# Patient Record
Sex: FEMALE | Race: WHITE | Employment: OTHER | URBAN - NONMETROPOLITAN AREA
[De-identification: names, ages, dates, MRNs, and addresses within clinical notes are randomized per-mention and may not be internally consistent; named-entity substitution may affect disease eponyms.]

---

## 2017-01-30 ENCOUNTER — OFFICE VISIT (OUTPATIENT)
Dept: FAMILY MEDICINE CLINIC | Age: 67
End: 2017-01-30

## 2017-01-30 VITALS
HEART RATE: 68 BPM | DIASTOLIC BLOOD PRESSURE: 60 MMHG | TEMPERATURE: 97.4 F | SYSTOLIC BLOOD PRESSURE: 120 MMHG | OXYGEN SATURATION: 98 % | WEIGHT: 146.8 LBS | BODY MASS INDEX: 24.46 KG/M2 | HEIGHT: 65 IN

## 2017-01-30 DIAGNOSIS — N32.81 OAB (OVERACTIVE BLADDER): ICD-10-CM

## 2017-01-30 DIAGNOSIS — M85.80 OSTEOPENIA: ICD-10-CM

## 2017-01-30 DIAGNOSIS — N39.3 STRESS INCONTINENCE: ICD-10-CM

## 2017-01-30 DIAGNOSIS — E07.9 THYROID DISORDER: ICD-10-CM

## 2017-01-30 DIAGNOSIS — E55.9 VITAMIN D DEFICIENCY: ICD-10-CM

## 2017-01-30 DIAGNOSIS — Z01.818 PRE-OP EXAMINATION: Primary | ICD-10-CM

## 2017-01-30 DIAGNOSIS — F32.A DEPRESSION, UNSPECIFIED DEPRESSION TYPE: ICD-10-CM

## 2017-01-30 DIAGNOSIS — H26.9 CATARACT, LEFT EYE: ICD-10-CM

## 2017-01-30 PROCEDURE — 99213 OFFICE O/P EST LOW 20 MIN: CPT | Performed by: NURSE PRACTITIONER

## 2020-06-25 ENCOUNTER — OFFICE VISIT (OUTPATIENT)
Dept: ORTHOPEDIC SURGERY | Age: 70
End: 2020-06-25
Payer: MEDICARE

## 2020-06-25 VITALS — HEIGHT: 65 IN | BODY MASS INDEX: 24.46 KG/M2 | WEIGHT: 146.83 LBS

## 2020-06-25 PROCEDURE — 99213 OFFICE O/P EST LOW 20 MIN: CPT | Performed by: ORTHOPAEDIC SURGERY

## 2020-06-25 RX ORDER — NAPROXEN 500 MG/1
500 TABLET ORAL 2 TIMES DAILY WITH MEALS
Qty: 60 TABLET | Refills: 0 | Status: SHIPPED | OUTPATIENT
Start: 2020-06-25 | End: 2020-08-07

## 2020-06-30 ENCOUNTER — TELEPHONE (OUTPATIENT)
Dept: ORTHOPEDIC SURGERY | Age: 70
End: 2020-06-30

## 2020-07-27 ENCOUNTER — OFFICE VISIT (OUTPATIENT)
Dept: ORTHOPEDIC SURGERY | Age: 70
End: 2020-07-27
Payer: MEDICARE

## 2020-07-27 VITALS — HEIGHT: 65 IN | WEIGHT: 146 LBS | BODY MASS INDEX: 24.32 KG/M2

## 2020-07-27 PROCEDURE — 99213 OFFICE O/P EST LOW 20 MIN: CPT | Performed by: ORTHOPAEDIC SURGERY

## 2020-07-27 RX ORDER — BETAMETHASONE SODIUM PHOSPHATE AND BETAMETHASONE ACETATE 3; 3 MG/ML; MG/ML
12 INJECTION, SUSPENSION INTRA-ARTICULAR; INTRALESIONAL; INTRAMUSCULAR; SOFT TISSUE ONCE
Status: COMPLETED | OUTPATIENT
Start: 2020-07-27 | End: 2020-07-27

## 2020-07-27 RX ORDER — BUPIVACAINE HYDROCHLORIDE 5 MG/ML
30 INJECTION, SOLUTION PERINEURAL ONCE
Status: COMPLETED | OUTPATIENT
Start: 2020-07-27 | End: 2020-07-27

## 2020-07-27 RX ADMIN — BUPIVACAINE HYDROCHLORIDE 150 MG: 5 INJECTION, SOLUTION PERINEURAL at 17:16

## 2020-07-27 RX ADMIN — BETAMETHASONE SODIUM PHOSPHATE AND BETAMETHASONE ACETATE 12 MG: 3; 3 INJECTION, SUSPENSION INTRA-ARTICULAR; INTRALESIONAL; INTRAMUSCULAR; SOFT TISSUE at 17:15

## 2020-07-27 NOTE — PROGRESS NOTES
of Systems:  Relevant review of systems reviewed and available in the patient's chart    Vital Signs: There were no vitals filed for this visit. General Exam:   Constitutional: Patient is adequately groomed with no evidence of malnutrition  DTRs: Deep tendon reflexes are intact  Mental Status: The patient is oriented to time, place and person. The patient's mood and affect are appropriate. Lymphatic: The lymphatic examination bilaterally reveals all areas to be without enlargement or induration. Vascular: Examination reveals no swelling or calf tenderness. Peripheral pulses are palpable and 2+. Neurological: The patient has good coordination. There is no weakness or sensory deficit. Right and left hip Examination:    Inspection:  No erythema swelling or signs of infection    Palpation:  Tenderness to palpation of the lateral aspect over the greater trochanter    Range of Motion:  Full range of motion with reproducible pain both over her groin and lateral hip. Strength:  5/5 hip flexion and abduction and adduction. Increased pain with hip flexion against resistance. Special Tests:  Positive Meli's test.     Skin: There are no rashes, ulcerations or lesions. Gait: Normal    Reflex 2+ patellar    Additional Comments:       Additional Examinations:         Right Upper Extremity:  Examination of the right upper extremity does not show any tenderness, deformity or injury. Range of motion is unremarkable. There is no gross instability. There are no rashes, ulcerations or lesions. Strength and tone are normal.  Left Upper Extremity: Examination of the left upper extremity does not show any tenderness, deformity or injury. Range of motion is unremarkable. There is no gross instability. There are no rashes, ulcerations or lesions.   Strength and tone are normal.    Radiology:       Site: [facility] #: [id number] #: [id number] Procedure: MR Left Hip w/o Contrast ; Reason for Exam: Exam: pain of both hip joints; r/o fx, AVN and bursitis    This document is confidential medical information.  Unauthorized disclosure or use of this information is prohibited by law. If you are not the intended recipient of this document, please advise us by calling immediately [phone].         [employer] [facility]              Patient Name: Flaca Pacheco    Case ID: [id number]    Patient : 1950    Referring Physician: Kayode Barragan MD    Exam Date: 2020    Exam Description: MR Right Hip w/o Contrast              HISTORY:  Pain of both hip joints.  Evaluate for fracture, AVN and bursitis.         TECHNICAL FACTORS:  Long- and short-axis fat- and water-weighted images were performed.         COMPARISON:  None.         FINDINGS:  No osseous fracture, AVN or mass.  Frayed blunted anterosuperior and superolateral    labrum.  No labral tear.         Tendinopathy, peritendinitis and partial-thickness tearing involves the gluteus minimus and    lateral facet attachment to the gluteus medius insertion with reactive bursitis.  Between    25%-50% of the attachment is torn.  Posterior facet attachment to the gluteus medius is spared.      Mild fatty atrophy involves the gluteus minimus muscle bilaterally.  Iliopsoas, rectus    femoris and hamstring tendon complex are unremarkable.         No chondral defect or loose body.         Sigmoid diverticulosis is noted on the film edge.         CONCLUSION:    1. Tendinopathy, peritendinitis and partial-thickness tearing involves the gluteus minimus and    less so lateral facet attachment of the gluteus medius insertion to the right greater    trochanter.  Associated bursitis.  Between 25%-50% of the attachment is torn. 2. Blunted worn anterosuperior labrum.  Chronic wear present.  No acute or displaced labral    tear. 3. No osseous fracture, AVN or mass. Assessment : Hip bursitis and partial abductor tear.   Patient is also suffering from more mild low back pain and hip flexor tendinitis    Impression:  Encounter Diagnoses   Name Primary?  Trochanteric bursitis of both hips Yes    Hip abductor tendinitis, unspecified laterality     Hip flexor tendinitis, unspecified laterality     Low back pain, unspecified back pain laterality, unspecified chronicity, unspecified whether sciatica present        Office Procedures:  Orders Placed This Encounter   Procedures    US ARTHR/ASP/INJ MAJOR JNT/BURSA LEFT     Standing Status:   Future     Number of Occurrences:   1     Standing Expiration Date:   7/27/2021   Ana Moreno \[facility] [place] [facility]\"" ([facility]) Physical Therapy     Referral Priority:   Routine     Referral Type:   Eval and Treat     Referral Reason:   Specialty Services Required     Requested Specialty:   Physical Therapy     Number of Visits Requested:   1     Left hip lateral injection    I discussed in detail the risks, benefits, and complications of an injection which include but are not limited to infection, skin reactions, hot swollen joints, and anaphylaxis with the patient. The patient verbalized good understanding and gave informed consent for the injection. The skin was prepped using sterile alcohol. A sterile 22-gauge needle was inserted into the area of maximal tenderness over the left  greater trochanter and a mixture of 2ml Beta-Beta, 5 mL of 0.25% Marcaine was injected under sterile technique. The needle was withdrawn and the puncture site sealed with a Band-Aid. Technique: Under sterile conditions a [employer] ultrasound unit with a variable frequency (6.0-15.0 MHz) linear transducer was used to localize the placement of a 22-gauge needle into the area of maximal tenderness over the left  greater trochanter. Findings: Successful needle placement for Hip injection. Final images were taken and saved for permanent record. The patient tolerated the injection well.  The patient was instructed to call the office immediately if there is any pain, redness, warmth, fever, or chills. Treatment Plan: With patient having more global symptoms we will start with a left lateral hip cortisone injection. She will stay on her Naprosyn. We will start her in physical therapy. We will see the patient back in 4 weeks. If she responds well continue with conservative care. If she fails to respond consideration for abductor repair.

## 2020-08-04 ENCOUNTER — HOSPITAL ENCOUNTER (OUTPATIENT)
Dept: PHYSICAL THERAPY | Age: 70
Setting detail: THERAPIES SERIES
Discharge: HOME | End: 2020-08-04
Payer: MEDICARE

## 2020-08-04 PROCEDURE — 97140 MANUAL THERAPY 1/> REGIONS: CPT

## 2020-08-04 PROCEDURE — 97161 PT EVAL LOW COMPLEX 20 MIN: CPT

## 2020-08-04 PROCEDURE — 97110 THERAPEUTIC EXERCISES: CPT

## 2020-08-04 NOTE — FLOWSHEET NOTE
Patient Education 10' Pt ed with HEP and progression of PT tx       Therapeutic Exercise and NMR EXR  [x] (11923) Provided verbal/tactile cueing for activities related to strengthening, flexibility, endurance, ROM for improvements in LE, proximal hip, and core control with self care, mobility, lifting, ambulation. [x] (14831) Provided verbal/tactile cueing for activities related to improving balance, coordination, kinesthetic sense, posture, motor skill, proprioception to assist with LE, proximal hip, and core control in self-care, mobility, lifting, ambulation and eccentric single leg control. NMR and Therapeutic Activities:    [x] (73547 or 70804) Provided verbal/tactile cueing for activities related to improving balance, coordination, kinesthetic sense, posture, motor skill, proprioception and motor activation to allow for proper function of core, proximal hip and LE with self-care and ADLs and functional mobility.    [x] (69416) Gait Re-education- Provided training and instruction to the patient for proper LE, core and proximal hip recruitment and positioning and eccentric body weight control with ambulation re-education including up and down stairs     Home Exercise Program:    [x] (28086) Reviewed/Progressed HEP activities related to strengthening, flexibility, endurance, ROM of core, proximal hip and LE for functional self-care, mobility, lifting and ambulation/stair navigation   [x] (42275) Reviewed/Progressed HEP activities related to improving balance, coordination, kinesthetic sense, posture, motor skill, proprioception of core, proximal hip and LE for self-care, mobility, lifting, and ambulation/stair navigation      Manual Treatments:  PROM / STM / Oscillations-Mobs:  G-I, II, III, IV (PA's, Inf., Post.)  [x] (97085) Provided manual therapy to mobilize LE, proximal hip and/or LS spine soft tissue/joints for the purpose of modulating pain, promoting relaxation, increasing ROM, reducing/eliminating soft tissue swelling/inflammation/restriction, improving soft tissue extensibility and allowing for proper ROM for normal function with self-care, mobility, lifting and ambulation. Modalities:      Charges:  Timed Code Treatment Minutes: 30'   Total Treatment Minutes:  45'   [facility]:  [facility] TIME:  MANUAL TIME:  UNTIMED MINUTES:   -  -  -  -      [x] EVAL (LOW) 28680 (typically 20 minutes face-to-face)  [] EVAL (MOD) 99700 (typically 30 minutes face-to-face)  [] EVAL (HIGH) 11944 (typically 45 minutes face-to-face)  [] RE-EVAL     [x] ZZ(01730) x  1   [] IONTO  [] NMR (66229) x     [] VASO  [x] Manual (07405) x 1    [] Other:  [] TA x      [] Mech Traction (76125)  [] ES(attended) (35685)      [] ES (un) (65255):    ASSESSMENT:  See eval    GOALS:   Short Term Goals: To be achieved in: 2 weeks  1. Independent in HEP and progression per patient tolerance, in order to prevent re-injury. [] Progressing: [] Met: [] Not Met: [] Adjusted   2. Patient will have a decrease in pain to facilitate improvement in movement, function, and ADLs as indicated by Functional Deficits. [] Progressing: [] Met: [] Not Met: [] Adjusted     Long Term Goals: To be achieved in: 12 weeks  1. Disability index score of 20% or less for the LEFS to assist with reaching prior level of function. [] Progressing: [] Met: [] Not Met: [] Adjusted   2. Patient will demonstrate increased AROM to equal the opposite side bilaterally to allow for proper joint functioning as indicated by patients Functional Deficits. [] Progressing: [] Met: [] Not Met: [] Adjusted   3. Patient will demonstrate an increase in strength of B Hip to 4+/5 grossly to allow for proper functional mobility as indicated by patients Functional Deficits. [] Progressing: [] Met: [] Not Met: [] Adjusted   4.  Patient will return to all transfers, work activities, and functional activities without increased symptoms or

## 2020-08-04 NOTE — PLAN OF CARE
[facility] [id number] [facility] [address], [address], [address] [facility]  Phone: [phone], [phone]                                                       Physical Therapy Certification    Dear Referring Practitioner: Geremias Jenkins,    We had the pleasure of evaluating the following patient for physical therapy services at [address] [place] [address]. A summary of our findings can be found in the initial assessment below. This includes our plan of care. If you have any questions or concerns regarding these findings, please do not hesitate to contact me at the office phone number checked above. Thank you for the referral.       Physician Signature:_______________________________Date:__________________  By signing above (or electronic signature), therapists plan is approved by physician    Patient: Sissy Manrique   : 1950   MRN: [id number]  Referring Physician: Referring Practitioner: Geremias Jenkins      Evaluation Date: 2020      Medical Diagnosis Information:  Diagnosis: B Hip Trochanteric Bursitis, Hip Abd/Flex Tendenitis   Treatment Diagnosis: M70.61; M70.62; M76.899                                         Insurance information: PT Insurance Information: [employer]     Precautions/ Contra-indications/Relevant Medical History: Partial Hip Abductor Tear    C-SSRS Triggered by Intake questionnaire (Past 2 wk assessment):   [x] No, Questionnaire did not trigger screening.   [] Yes, Patient intake triggered further evaluation      [] C-SSRS Screening completed  [] PCP notified via Plan of Care  [] Emergency services notified     Latex Allergy:  [x]NO      []YES  Preferred Language for Healthcare:   [x]English       []other:    SUBJECTIVE: Patient stated complaint: Patient is a 78 y/o female who present increased Hip pain bilaterally with L Hip > R Hip for a while now.  Patient had a MRI which revealed a small glut min tear and has had cortisone shot in the left hip. Functional Disability Index:LEFS: 62% (Score: 36/80)    Pain Scale: 1/10 current; 4-5/10 at worst  Easing factors: heat, Naproxen, Tyelonol  Provocative factors: working in garden,      Type: []Constant   []Intermittent  []Radiating []Localized []other:     Numbness/Tingling: Patient denies numbness and tingling    Occupation/School: Retired    Living Status/Prior Level of Function: Independent with ADLs and IADLs     OBJECTIVE:   SLR R 68 ° ; L 60 °      Hip 90/90 R 30 ° ; L: 31 °     ROM LEFT RIGHT   HIP Flex     HIP Abd     HIP Ext     HIP IR 20 °  18 °    HIP ER 30 °  35 °    Knee ext     Knee Flex     Ankle PF     Ankle DF     Ankle In     Ankle Ev     Strength  LEFT RIGHT   HIP Flexors 4-/5 4-/5   HIP Abductors \" \"   HIP Ext \" \"   Hip ER \" \"   Knee EXT (quad) 4+/5 4+/5   Knee Flex (HS) \" \"   Ankle DF     Ankle PF     Ankle Inv     Ankle EV          Balance (up to 10 sec) LEFT RIGHT   Feet Together     Off Set Stance     Tandem Stance     Single Limb          Circumference             Reflexes/Sensation:    [x]Dermatomes/Myotomes intact    [x]Reflexes equal and normal bilaterally   []Other:    Joint mobility:    []Normal    [x]Hypo   []Hyper    Palpation: noted tenderness to light touch     Functional Mobility/Transfers: WNL    Posture: decreased     Bandages/Dressings/Incisions: n/a    Gait: (include devices/WB status) WNL    Orthopedic Special Tests:  Vertell Hefty Test (+)                       [x] Patient history, allergies, meds reviewed. Medical chart reviewed. See intake form. Review Of Systems (ROS):  [x]Performed Review of systems (Integumentary, CardioPulmonary, Neurological) by intake and observation. Intake form has been scanned into medical record. Patient has been instructed to contact their primary care physician regarding ROS issues if not already being addressed at this time.       Co-morbidities/Complexities (which will affect course of rehabilitation):   []None Arthritic conditions   []Rheumatoid arthritis (M05.9)  []Osteoarthritis (M19.91)   Cardiovascular conditions   []Hypertension (I10)  []Hyperlipidemia (E78.5)  []Angina pectoris (I20)  []Atherosclerosis (I70)   Musculoskeletal conditions   []Disc pathology   []Congenital spine pathologies   [x]Prior surgical intervention: R shoulder RTC Repair 1999, R Elbow Surgery for Tendonitis   [x]Osteoporosis (M81.8)  []Osteopenia (M85.8)   Endocrine conditions   [x]Hypothyroid (E03.9)  []Hyperthyroid Gastrointestinal conditions   []Constipation (A83.69)   Metabolic conditions   []Morbid obesity (E66.01)  []Diabetes type 1(E10.65) or 2 (E11.65)   []Neuropathy (G60.9)     Pulmonary conditions   []Asthma (J45)  []Coughing   []COPD (J44.9)   Psychological Disorders  []Anxiety (F41.9)  []Depression (F32.9)   []Other:   [x]Other:   Hip Fracture 2009     Barriers to/and or personal factors that will affect rehab potential:              []Age  []Sex              []Motivation/Lack of Motivation                        []Co-Morbidities              []Cognitive Function, education/learning barriers              []Environmental, home barriers              []profession/work barriers  []past PT/medical experience  []other:  Justification:     Falls Risk Assessment (30 days):   [x] Falls Risk assessed and no intervention required.   [] Falls Risk assessed and Patient requires intervention due to being higher risk   TUG score (>12s at risk):     [] Falls education provided, including       ASSESSMENT:   Functional Impairments:     []Noted lumbar/proximal hip/LE joint hypomobility   []Decreased LE functional ROM   []Decreased core/proximal hip strength and neuromuscular control   [x]Decreased LE functional strength   [x]Reduced balance/proprioceptive control   []other:      Functional Activity Limitations (from functional questionnaire and intake)   []Reduced ability to tolerate prolonged functional positions   []Reduced ability or difficulty with changes of positions or transfers between positions   []Reduced ability to maintain good posture and demonstrate good body mechanics with sitting,  bending, and lifting   []Reduced ability to sleep   [] Reduced ability or tolerance with driving and/or computer work   [x]Reduced ability to perform lifting, carrying tasks   [x]Reduced ability to squat   [x]Reduced ability to forward bend   [x]Reduced ability to ambulate prolonged functional periods/distances/surfaces   [x]Reduced ability to ascend/descend stairs   []Reduced ability to run, hop, cut or jump   []other:    Participation Restrictions   []Reduced participation in self care activities   [x]Reduced participation in home management activities   [x]Reduced participation in work activities   []Reduced participation in social activities. []Reduced participation in sport/recreation activities. Classification :    []Signs/symptoms consistent with post-surgical status including decreased ROM, strength and  function.    []Signs/symptoms consistent with joint sprain/strain   []Signs/symptoms consistent with patella-femoral syndrome   []Signs/symptoms consistent with knee OA/hip OA   [x]Signs/symptoms consistent with internal derangement of knee/Hip   [x]Signs/symptoms consistent with functional hip weakness/NMR control      []Signs/symptoms consistent with tendinitis/tendinosis    []signs/symptoms consistent with pathology which may benefit from Dry needling      []other:      Prognosis/Rehab Potential:      []Excellent   [x]Good    []Fair   []Poor    Tolerance of evaluation/treatment:    []Excellent   [x]Good    []Fair   []Poor    Physical Therapy Evaluation Complexity Justification   [x] A history of present problem with:  [] no personal factors and/or comorbidities that impact the plan of care;  []1-2 personal factors and/or comorbidities that impact the plan of care  [x]3 personal factors and/or comorbidities that impact the plan of care  [x] An examination of body systems using standardized tests and measures addressing any of the following: body structures and functions (impairments), activity limitations, and/or participation restrictions;:  [] a total of 1-2 or more elements   [] a total of 3 or more elements   [x] a total of 4 or more elements   [x] A clinical presentation with:  [x] stable and/or uncomplicated characteristics   [] evolving clinical presentation with changing characteristics  [] unstable and unpredictable characteristics;   [x] Clinical decision making of [x] low, [] moderate, [] high complexity using standardized patient assessment instrument and/or measurable assessment of functional outcome. [x] EVAL (LOW) 64710 (typically 20 minutes face-to-face)  [] EVAL (MOD) 42729 (typically 30 minutes face-to-face)  [] EVAL (HIGH) 65273 (typically 45 minutes face-to-face)  [] RE-EVAL     PLAN  Frequency/Duration:  1-2 days per week for 12 weeks:  Interventions:  [x]  Therapeutic exercise including: strength training, ROM, for Lower extremity and core   [x]  NMR activation and proprioception for LE, Glutes and Core   [x]  Manual therapy as indicated for LE, Hip and spine to include: Dry Needling/IASTM, STM, PROM, Gr I-IV mobilizations, manipulation. [x] Modalities as needed that may include: thermal agents, E-stim, Biofeedback, US, iontophoresis as indicated  [x] Patient education on joint protection, postural re-education, activity modification, progression of HEP. HEP instruction: (see scanned forms)    GOALS:    Therapist goals for Patient:   Short Term Goals: To be achieved in: 2 weeks  1. Independent in HEP and progression per patient tolerance, in order to prevent re-injury. [] Progressing: [] Met: [] Not Met: [] Adjusted   2. Patient will have a decrease in pain to facilitate improvement in movement, function, and ADLs as indicated by Functional Deficits. [] Progressing: [] Met: [] Not Met: [] Adjusted     Long Term Goals:  To be achieved in: 12 weeks  1. Disability index score of 20% or less for the LEFS to assist with reaching prior level of function. [] Progressing: [] Met: [] Not Met: [] Adjusted   2. Patient will demonstrate increased AROM to equal the opposite side bilaterally to allow for proper joint functioning as indicated by patients Functional Deficits. [] Progressing: [] Met: [] Not Met: [] Adjusted   3. Patient will demonstrate an increase in strength of B Hip to 4+/5 grossly to allow for proper functional mobility as indicated by patients Functional Deficits. [] Progressing: [] Met: [] Not Met: [] Adjusted   4. Patient will return to all transfers, work activities, and functional activities without increased symptoms or restriction. [] Progressing: [] Met: [] Not Met: [] Adjusted   5. Patient will have 0/10 pain with ADL's.  [] Progressing: [] Met: [] Not Met: [] Adjusted   6.  Patient stated goal: To return to PLOF and prior walking distance  [] Progressing: [] Met: [] Not Met: [] Adjusted      Electronically signed by:  Octavio Nicolas, PT

## 2020-08-07 RX ORDER — NAPROXEN 500 MG/1
TABLET ORAL
Qty: 60 TABLET | Refills: 0 | Status: SHIPPED | OUTPATIENT
Start: 2020-08-07 | End: 2021-03-11 | Stop reason: ALTCHOICE

## 2020-08-14 ENCOUNTER — HOSPITAL ENCOUNTER (OUTPATIENT)
Dept: PHYSICAL THERAPY | Age: 70
Setting detail: THERAPIES SERIES
Discharge: HOME | End: 2020-08-14
Payer: MEDICARE

## 2020-08-14 PROCEDURE — 97112 NEUROMUSCULAR REEDUCATION: CPT | Performed by: SPECIALIST

## 2020-08-14 PROCEDURE — 97140 MANUAL THERAPY 1/> REGIONS: CPT | Performed by: SPECIALIST

## 2020-08-14 PROCEDURE — 97110 THERAPEUTIC EXERCISES: CPT | Performed by: SPECIALIST

## 2020-08-14 NOTE — FLOWSHEET NOTE
[facility], [address] [place] [address] [address], [id number]    Physical Therapy Treatment Note/ Progress Report:     Date:  2020    Patient Name:  Mandi Mendoza    :  1950  MRN: [id number]  Restrictions/Precautions:    Medical/Treatment Diagnosis Information:  · Diagnosis: B Hip Trochanteric Bursitis, Hip Abd/Flex Tendenitis  · Treatment Diagnosis: M70.61; M70.62; F68.613  Insurance/Certification information:  PT Insurance Information: Med [employer]  Physician Information:  Referring Practitioner: Sania Peters  Has the plan of care been signed (Y/N):        []  Yes  [x]  No     Date of Patient follow up with Physician:     Is this a Progress Report:     []  Yes  [x]  No      If Yes:  Date Range for reporting period:  Initial Eval: 2020  Beginnin2020 --- Endin/3/2020    Progress report will be due (10 Rx or 30 days whichever is less): 9/3/7780     Recertification will be due (POC Duration  / 90 days whichever is less): 2020      Visit # Insurance Allowable Auth Required   In Person 2 Med Nec []  Yes     []  No    Tele Health -  []  Yes     []  No    Total 2       Functional Scale: LEFS: 62% (Score: 36/80)   Date assessed: 2020      Latex Allergy:  [x]NO      []YES  Preferred Language for Healthcare:   [x]English       []other:    Pain level:  1-2/10     SUBJECTIVE:  Reports L hip discomfort with sitting in car    OBJECTIVE: See eval   Observation:    Test measurements:      RESTRICTIONS/PRECAUTIONS: Partial Glut Min Tear    Exercises/Interventions:   Therapeutic Ex (11736)  Therapeutic Activity (47274)  NMR re-education (24612) Sets/Reps Notes/CUES   Bike 6 min         PPT 10 x 10\"    Bridge with PPT 10 x 10\"    Bent Knee Fall Out with PPT 10 x 5\" ea          HL Hip Abd 3 way 15 x ea Green   SL Clamshell 15 x ea Green TC cueing for proper form        SLR with PPT 20 x    SSLR 20 x  TC for proper form                  LP 60# 30x    KAREN  15# 10x ea         Piriformis stretch 30x2 Piriformis cross over stretch 30x2                             Manual Intervention ([id number])     STM to Lateral Hip 8'                                                 Patient Education 10' Pt ed with HEP and progression of PT tx       Therapeutic Exercise and NMR EXR  [x] (47901) Provided verbal/tactile cueing for activities related to strengthening, flexibility, endurance, ROM for improvements in LE, proximal hip, and core control with self care, mobility, lifting, ambulation. [x] (77375) Provided verbal/tactile cueing for activities related to improving balance, coordination, kinesthetic sense, posture, motor skill, proprioception to assist with LE, proximal hip, and core control in self-care, mobility, lifting, ambulation and eccentric single leg control. NMR and Therapeutic Activities:    [x] (02906 or 03662) Provided verbal/tactile cueing for activities related to improving balance, coordination, kinesthetic sense, posture, motor skill, proprioception and motor activation to allow for proper function of core, proximal hip and LE with self-care and ADLs and functional mobility.    [x] (42001) Gait Re-education- Provided training and instruction to the patient for proper LE, core and proximal hip recruitment and positioning and eccentric body weight control with ambulation re-education including up and down stairs     Home Exercise Program:    [x] (79386) Reviewed/Progressed HEP activities related to strengthening, flexibility, endurance, ROM of core, proximal hip and LE for functional self-care, mobility, lifting and ambulation/stair navigation   [x] (06286) Reviewed/Progressed HEP activities related to improving balance, coordination, kinesthetic sense, posture, motor skill, proprioception of core, proximal hip and LE for self-care, mobility, lifting, and ambulation/stair navigation      Manual Treatments:  PROM / STM / Oscillations-Mobs:  G-I, II, III, IV (PA's, Inf., Post.)  [x] (33351) Provided manual therapy to mobilize LE, proximal hip and/or LS spine soft tissue/joints for the purpose of modulating pain, promoting relaxation, increasing ROM, reducing/eliminating soft tissue swelling/inflammation/restriction, improving soft tissue extensibility and allowing for proper ROM for normal function with self-care, mobility, lifting and ambulation. Modalities:      Charges:  Timed Code Treatment Minutes: 38   Total Treatment Minutes:  38   BWC:  TE TIME:  NMR TIME:  MANUAL TIME:  UNTIMED MINUTES:   -  -  -  -      [] EVAL (LOW) 96542 (typically 20 minutes face-to-face)  [] EVAL (MOD) 20584 (typically 30 minutes face-to-face)  [] EVAL (HIGH) 53528 (typically 45 minutes face-to-face)  [] RE-EVAL     [x] IM(01662) x  1   [] IONTO  [x] NMR (08960) x  1  [] VASO  [x] Manual (97783) x 1    [] Other:  [] TA x      [] Mech Traction (92516)  [] ES(attended) (47305)      [] ES (un) (09288):    ASSESSMENT:  Good tolerance with TE, tenderness with MFR.    GOALS:   Short Term Goals: To be achieved in: 2 weeks  1. Independent in HEP and progression per patient tolerance, in order to prevent re-injury. [x] Progressing: [] Met: [] Not Met: [] Adjusted   2. Patient will have a decrease in pain to facilitate improvement in movement, function, and ADLs as indicated by Functional Deficits. [x] Progressing: [] Met: [] Not Met: [] Adjusted     Long Term Goals: To be achieved in: 12 weeks  1. Disability index score of 20% or less for the LEFS to assist with reaching prior level of function. [x] Progressing: [] Met: [] Not Met: [] Adjusted   2. Patient will demonstrate increased AROM to equal the opposite side bilaterally to allow for proper joint functioning as indicated by patients Functional Deficits. [x] Progressing: [] Met: [] Not Met: [] Adjusted   3. Patient will demonstrate an increase in strength of B Hip to 4+/5 grossly to allow for proper functional mobility as indicated by patients Functional Deficits.    [x] with most recent update on progress.

## 2020-08-21 ENCOUNTER — HOSPITAL ENCOUNTER (OUTPATIENT)
Dept: PHYSICAL THERAPY | Age: 70
Setting detail: THERAPIES SERIES
Discharge: HOME | End: 2020-08-21
Payer: MEDICARE

## 2020-08-21 NOTE — FLOWSHEET NOTE
[facility], [address] [place] [address], [address] [place] [address] [address]  Phone: [phone]   Fax:     [phone]    Physical Therapy  Cancellation/No-show Note  Patient Name:  Alondra Hoffman  :  1950   Date:  2020    Cancelled visits to date: 0  No-shows to date: 0    For today's appointment patient:  [x]  Cancelled  []  Rescheduled appointment  []  No-show     Reason given by patient:  []  Patient ill  []  Conflicting appointment  []  No transportation    []  Conflict with work  []  No reason given  [x]  Other:     Comments: Had to take daughter to hospital     Phone call information:   []  Phone call made today to patient at _ time at number provided:      []  Patient answered, conversation as follows:    []  Patient did not answer, message left as follows:  []  Phone call not made today  [x]  Phone call not needed - pt contacted us to cancel and provided reason for cancellation.      Electronically signed by:  Stephanie Tan PT

## 2020-12-28 ENCOUNTER — OFFICE VISIT (OUTPATIENT)
Dept: ORTHOPEDIC SURGERY | Age: 70
End: 2020-12-28
Payer: MEDICARE

## 2020-12-28 VITALS — HEIGHT: 64 IN | BODY MASS INDEX: 24.92 KG/M2 | WEIGHT: 146 LBS

## 2020-12-28 PROCEDURE — L3908 WHO COCK-UP NONMOLDE PRE OTS: HCPCS | Performed by: PHYSICAL MEDICINE & REHABILITATION

## 2020-12-28 PROCEDURE — 99204 OFFICE O/P NEW MOD 45 MIN: CPT | Performed by: PHYSICAL MEDICINE & REHABILITATION

## 2020-12-28 RX ORDER — METHYLPREDNISOLONE 4 MG/1
TABLET ORAL
Qty: 1 KIT | Refills: 0 | Status: SHIPPED | OUTPATIENT
Start: 2020-12-28 | End: 2021-01-03

## 2020-12-28 NOTE — PROGRESS NOTES
New Patient: SPINE    CHIEF COMPLAINT:    Chief Complaint   Patient presents with    Neck Pain     numbness in rt hand x3mths       HISTORY OF PRESENT ILLNESS:                The patient is a 72 y.o. female established patient who I last saw 2015. 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 She reports 1 month history of numbness over digits 1 through 3. Worse when she is brushing her teeth or doing her hair. She has a history of neck problems but no radiating pain from her neck to her hand. She has some subjective weakness. .  She previously had similar symptoms she said she had a negative EMG limbs from her neck. This was resolved with conservative care    She had some treatment with physical therapy recently is now referred here for follow-up  Past Medical History:   Diagnosis Date    Depression     Ischial bursitis     Osteopenia     Thyroid disease     Vitamin D deficiency           Pain Assessment  Location of Pain: Neck  Location Modifiers: Right  Severity of Pain: 0    The pain assessment was noted & reviewed in the medical record today.      Current/Past Treatment:   · Physical Therapy:   · Chiropractic:     · Injection:     Medications:            NSAIDS:             Muscle relaxer:              Steriods:              Neuropathic medications:              Opioids:            Other:   · Surgery/Consult:    Work Status/Functionality: Now retired    Past Medical History: Medical history form was reviewed today & scanned into the media tab  Past Medical History:   Diagnosis Date    Depression     Ischial bursitis     Osteopenia     Thyroid disease     Vitamin D deficiency       Past Surgical History:     Past Surgical History:   Procedure Laterality Date    ELBOW SURGERY      HYSTERECTOMY      SHOULDER SURGERY      Right     Current Medications:     Current Outpatient Medications:     methylPREDNISolone (MEDROL, STEFANY,) 4 MG tablet, Take by mouth., Disp: 1 kit, Rfl: 0   naproxen (NAPROSYN) 500 MG tablet, TAKE 1 TABLET BY MOUTH TWICE DAILY WITH MEALS, Disp: 60 tablet, Rfl: 0    aspirin 81 MG tablet, Take 81 mg by mouth daily, Disp: , Rfl:     ibuprofen (ADVIL;MOTRIN) 600 MG tablet, Take 1 tablet by mouth every 8 hours as needed for Pain., Disp: 60 tablet, Rfl: 0    Multiple Vitamins-Minerals (CENTRUM SILVER PO), Take  by mouth., Disp: , Rfl:     Vitamin D (CHOLECALCIFEROL) 1000 UNITS CAPS capsule, Take 2,000 Units by mouth daily. , Disp: , Rfl:     levothyroxine (SYNTHROID) 25 MCG tablet, Take 25 mcg by mouth Daily. , Disp: , Rfl:     LIOTHYRONINE SODIUM, by Does not apply route., Disp: , Rfl:     escitalopram (LEXAPRO) 5 MG tablet, Take 5 mg by mouth daily. , Disp: , Rfl:     hydrochlorothiazide (HYDRODIURIL) 25 MG tablet, Take 50 mg by mouth daily , Disp: , Rfl:     estrogens conjugated, synthetic A, (CENESTIN) 1.25 MG tablet, Take 10 mg by mouth daily. , Disp: , Rfl:   Allergies: Forteo [parathyroid hormone (recomb)], Atarax [hydroxyzine], Codeine, and Vicodin [hydrocodone-acetaminophen]  Social History:    reports that she has never smoked. She has never used smokeless tobacco. She reports that she does not drink alcohol or use drugs.   Family History:   Family History   Problem Relation Age of Onset   [facility] Cancer Mother         brain    Cancer Sister         breast    Other Father         MI       REVIEW OF SYSTEMS: Full ROS noted & scanned   CONSTITUTIONAL: Denies unexplained weight loss, fevers, chills or fatigue  NEUROLOGICAL: Denies unsteady gait or progressive weakness  MUSCULOSKELETAL: Denies joint swelling or redness  PSYCHOLOGICAL: Denies anxiety, depression   SKIN: Denies skin changes, delayed healing, rash, itching   HEMATOLOGIC: Denies easy bleeding or bruising  ENDOCRINE: Denies excessive thirst, urination, heat/cold  RESPIRATORY: Denies current dyspnea, cough  GI: Denies nausea, vomiting, diarrhea   : Denies bowel or bladder issues PHYSICAL EXAM:    Vitals: Height 5' 4\" (1.626 m), weight 146 lb (66.2 kg), not currently breastfeeding. GENERAL EXAM:  · General Apparence: Patient is adequately groomed with no evidence of malnutrition. · Orientation: The patient is oriented to time, place and person. · Mood & Affect:The patient's mood and affect are appropriate   · Vascular: Examination reveals no swelling tenderness in upper or lower extremities. Good capillary refill  · Lymphatic: The lymphatic examination bilaterally reveals all areas to be without enlargement or induration  · Sensation: Sensation is intact without deficit  · Coordination/Balance: Good coordination     CERVICAL EXAMINATION:  · Inspection: Local inspection shows no step-off or bruising. Cervical alignment is normal.     · Palpation: No evidence of tenderness at the midline, and trapezius. Paraspinal tenderness is present. There is no step-off or paraspinal spasm. · Range of Motion: Neck pain with flexion  · Strength: 5/5 bilateral upper extremities   · Special Tests:    ·   Spurling's causes neck pain, L'Hermitte's & Yeager's negative bilaterally. ·   Ferguson and Impingement tests are negative bilaterally. ·  Phalen's test positive on the right     · Skin:There are no rashes, ulcerations or lesions in right & left upper extremities. · Reflexes: Bilaterally triceps, biceps and brachioradialis are 2+. Clonus absent bilaterally at the feet. · Additional Examinations:       · RIGHT UPPER EXTREMITY:  Inspection/examination of the right upper extremity does not show any tenderness, deformity or injury. Range of motion is full. There is no gross instability. There are no rashes, ulcerations or lesions.  Strength and tone are normal. · LEFT UPPER EXTREMITY: Inspection/examination of the left upper extremity does not show any tenderness, deformity or injury. Range of motion is full. There is no gross instability. There are no rashes, ulcerations or lesions. Strength and tone are normal.    LUMBAR/SACRAL EXAMINATION:  · Inspection: Local inspection shows no step-off or bruising. Lumbar alignment is normal.  Sagittal and Coronal balance is neutral.      ·   · Strength:   Strength testing is 5/5 in all muscle groups tested. · Special Tests:   Straight leg raise and crossed SLR negative. Leg length and pelvis level.  0 out of 5 Cj's signs. · Skin: There are no rashes, ulcerations or lesions. · Reflexes: Reflexes are symmetrically 2+ at the patellar and ankle tendons. Clonus absent bilaterally at the feet. · Gait & station: Normal gait  · Additional Examinations:   · RIGHT LOWER EXTREMITY: Inspection/examination of the right lower extremity does not show any tenderness, deformity or injury. Range of motion is full. There is no gross instability. There are no rashes, ulcerations or lesions. Strength and tone are normal.  ·   · LEFT LOWER EXTREMITY:  Inspection/examination of the left lower extremity does not show any tenderness, deformity or injury. Range of motion is full. There is no gross instability. There are no rashes, ulcerations or lesions. Strength and tone are normal.    Diagnostic Testing:      Cervical 2 views AP and lateral x-rays show moderate diffuse discogenic spondylosis; unchanged from 2060    Impression:    Right carpal tunnel syndrome  History of cervical strain, cervical discogenic spondylosis      Plan:     I think the majority of her symptoms are from carpal tunnel syndrome    She will try cock-up wrist splint and a Medrol Dosepak    She will call if no better in 1 to 2 weeks.   If not then directly schedule EMG right upper extremity    F Beto Najera

## 2021-03-11 ENCOUNTER — OFFICE VISIT (OUTPATIENT)
Dept: INTERNAL MEDICINE CLINIC | Age: 71
End: 2021-03-11

## 2021-03-11 VITALS
HEIGHT: 64 IN | BODY MASS INDEX: 25.61 KG/M2 | DIASTOLIC BLOOD PRESSURE: 78 MMHG | RESPIRATION RATE: 18 BRPM | WEIGHT: 150 LBS | SYSTOLIC BLOOD PRESSURE: 145 MMHG | TEMPERATURE: 98.5 F | HEART RATE: 70 BPM

## 2021-03-11 DIAGNOSIS — E03.9 ACQUIRED HYPOTHYROIDISM: ICD-10-CM

## 2021-03-11 DIAGNOSIS — R03.0 ELEVATED BLOOD PRESSURE READING: ICD-10-CM

## 2021-03-11 DIAGNOSIS — Z76.89 ENCOUNTER TO ESTABLISH CARE: Primary | ICD-10-CM

## 2021-03-11 DIAGNOSIS — M80.80XD LOCALIZED OSTEOPOROSIS WITH CURRENT PATHOLOGICAL FRACTURE WITH ROUTINE HEALING, SUBSEQUENT ENCOUNTER: ICD-10-CM

## 2021-03-11 DIAGNOSIS — F32.9 REACTIVE DEPRESSION: ICD-10-CM

## 2021-03-11 PROCEDURE — 99203 OFFICE O/P NEW LOW 30 MIN: CPT | Performed by: INTERNAL MEDICINE

## 2021-03-11 RX ORDER — LEVOTHYROXINE SODIUM 0.05 MG/1
50 TABLET ORAL DAILY
COMMUNITY
Start: 2021-02-04 | End: 2021-06-18 | Stop reason: SDUPTHER

## 2021-03-11 NOTE — PROGRESS NOTES
Samantha Head (:  1950) is a 72 y.o. female,New patient, here for evaluation of the following chief complaint(s):  Establish Care          HPI     72 y.o. female with hx of hypothyroid and anxiety here to establish care    Hypothyroid - on cytomel and levothyroxine , previously seen endocrine at [facility] but not anymore  No recent heat or cold intolerance .  No recent weight changes  No constipation     Chronic anxiety and mild depression - started with husbands illness and worsened after he passed away  - on lexapro 5 mg daily with good control    Non smoker, non alcoholic    Hx of osteoporosis , on vit d supplements only     Hx of left femur fracture 10 yrs ago     Reports chronic shawn hip pains and shoulder pain fw        Past Medical History:   Diagnosis Date    Depression     Ischial bursitis     Osteopenia     Thyroid disease     Vitamin D deficiency      Past Surgical History:   Procedure Laterality Date    ELBOW SURGERY      HYSTERECTOMY      SHOULDER SURGERY      Right     Allergies   Allergen Reactions    Forteo [Parathyroid Hormone (Recomb)] Shortness Of Breath, Rash and Other (See Comments)     Boils under arms and upper thigh    Atarax [Hydroxyzine]     Codeine Nausea Only    Vicodin [Hydrocodone-Acetaminophen]      Social History     Socioeconomic History    Marital status:      Spouse name: Not on file    Number of children: Not on file    Years of education: Not on file    Highest education level: Not on file   Occupational History    Not on file   Social Needs    Financial resource strain: Not on file    Food insecurity     Worry: Not on file     Inability: Not on file    Transportation needs     Medical: Not on file     Non-medical: Not on file   Tobacco Use    Smoking status: Never Smoker    Smokeless tobacco: Never Used   Substance and Sexual Activity    Alcohol use: No    Drug use: No    Sexual activity: Not on file   Lifestyle    Physical activity     Days per week: Not on file     Minutes per session: Not on file    Stress: Not on file   Relationships    Social connections     Talks on phone: Not on file     Gets together: Not on file     Attends Judaism service: Not on file     Active member of club or organization: Not on file     Attends meetings of clubs or organizations: Not on file     Relationship status: Not on file    Intimate partner violence     Fear of current or ex partner: Not on file     Emotionally abused: Not on file     Physically abused: Not on file     Forced sexual activity: Not on file   Other Topics Concern    Not on file   Social History Narrative    Not on file     Family History   Problem Relation Age of Onset    Cancer Mother         brain    Cancer Sister         breast    Allergy (Severe) Sister     High Blood Pressure Sister     Other Father         MI    Heart Disease Father     Diabetes Maternal Cousin     Allergy (Severe) Sister     High Blood Pressure Sister     Allergy (Severe) Sister     High Blood Pressure Sister     High Blood Pressure Daughter        Review of Systems      Constitutional: Negative for fever or chills  HENT: Negative for sore throat   Eyes: Negative for redness   Respiratory: Negative  for dyspnea, cough   Cardiovascular: Negative for chest pain   Gastrointestinal:neg for abd pain, nausea or vomiting or diarrhea  No melena or BRPR  Genitourinary: Negative for hematuria   Musculoskeletal: Negative for arthralgias   Skin: Negative for rash   Neurological: Negative for syncope   Hematological: Negative for adenopathy   Psychiatric/Behavorial: Negative for anxiety      Physical Exam  Vitals:    03/11/21 1603   Temp: 98.5 °F (36.9 °C)         General: elderly female,  Awake, alert and oriented.  Appears to be not in any distress  Mucous Membranes:  Pink , anicteric  Neck: No JVD, no carotid bruit, no thyromegaly  Chest:  Clear to auscultation bilaterally, no added sounds Cardiovascular:  RRR S1S2 heard, no murmurs or gallops  Abdomen:  Soft, undistended, non tender, no organomegaly, BS present  Extremities: No edema or cyanosis. Distal pulses well felt  Neurological : grossly normal     Diagnosis Orders   1. Encounter to establish care  [address] [employer]   2. Reactive depression     3. Acquired hypothyroidism     4. Localized osteoporosis with current pathological fracture with routine healing, subsequent encounter     5. Elevated blood pressure reading         Hypothyroid , acquired- on cytomel and synthroid, check TSH and adjust meds   No symptoms of hypo or hyperthryoid    Reaction depression - on lexapro 5 mg , can increase if needed     Osteoporosis with hx of left femur fracture - had dexa at [facility]  Consider prolia    Continue vit d supplements     Hx of hyperTGL - need repeat labs     Had pna vaccine, recommend pneumovax and covid vaccine    An electronic signature was used to authenticate this note.     --Evelin Alan MD

## 2021-03-12 ENCOUNTER — CLINICAL DOCUMENTATION (OUTPATIENT)
Dept: SPIRITUAL SERVICES | Age: 71
End: 2021-03-12

## 2021-03-12 LAB — TSH REFLEX: 1.26 UIU/ML (ref 0.27–4.2)

## 2021-03-12 NOTE — PROGRESS NOTES
Pt called and scheduled appointment to complete ADs on 3/16/21 at 1430hrs in the [facility] office.

## 2021-03-12 NOTE — FLOWSHEET NOTE
03/12/21 1110   Encounter Summary   Services provided to: Patient   Referral/Consult From: Physician;Patient   Continue Visiting Yes  (3/12 Pt called & set-up AD appt for 3/16/21, 1430hrs, [facility])   Complexity of Encounter Moderate   Length of Encounter 15 minutes   Advance Care Planning Yes   Advance Directives (For Healthcare)   Healthcare Directive No, patient does not have an advance directive for healthcare treatment   Advance Directives   (Scheduled appt to complete w/[facility])

## 2021-03-16 ENCOUNTER — CLINICAL DOCUMENTATION (OUTPATIENT)
Dept: SPIRITUAL SERVICES | Age: 71
End: 2021-03-16

## 2021-03-16 NOTE — PROGRESS NOTES
call to check on patient, patient had scheduled a time to fill out Power of [place] forms with  at [facility] office. Patient had forgotten about appointment. Patient to call back with a time to assist with completing forms, has [place] office number.     Simona Sparrow       03/16/21 1542   Encounter Summary   Services provided to: Patient   Referral/Consult From: Other    Support System Spouse   Continue Visiting   (3/16: follow up, pt forgot about meeting for POA/LW)   Complexity of Encounter Moderate   Length of Encounter 15 minutes   Spiritual Assessment Completed Yes   Spiritual/Yazidi   Type Spiritual support   Assessment Approachable

## 2021-04-26 RX ORDER — FLUTICASONE PROPIONATE 50 MCG
2 SPRAY, SUSPENSION (ML) NASAL DAILY
Qty: 3 BOTTLE | Refills: 0 | Status: SHIPPED | OUTPATIENT
Start: 2021-04-26 | End: 2022-02-09

## 2021-05-25 ENCOUNTER — TELEPHONE (OUTPATIENT)
Dept: INTERNAL MEDICINE CLINIC | Age: 71
End: 2021-05-25

## 2021-05-25 NOTE — TELEPHONE ENCOUNTER
----- Message from Nicko Stark MD sent at 5/25/2021  4:55 PM EDT -----  Contact: [phone]  Tomorrow  ----- Message -----  From: Aidee Corbin  Sent: 5/25/2021   4:39 PM EDT  To: Nicko Stark MD    Pt has a rash on her neck, shoulders, arm and hands started a week ago it seems to be spreading wanted to get an appointment with you this week she cant get into see her dermatologist

## 2021-05-26 ENCOUNTER — OFFICE VISIT (OUTPATIENT)
Dept: INTERNAL MEDICINE CLINIC | Age: 71
End: 2021-05-26

## 2021-05-26 VITALS
WEIGHT: 148 LBS | DIASTOLIC BLOOD PRESSURE: 75 MMHG | HEIGHT: 64 IN | SYSTOLIC BLOOD PRESSURE: 125 MMHG | HEART RATE: 70 BPM | BODY MASS INDEX: 25.27 KG/M2 | RESPIRATION RATE: 18 BRPM

## 2021-05-26 DIAGNOSIS — B35.1 NAIL FUNGAL INFECTION: ICD-10-CM

## 2021-05-26 DIAGNOSIS — L24.9 IRRITANT CONTACT DERMATITIS, UNSPECIFIED TRIGGER: ICD-10-CM

## 2021-05-26 DIAGNOSIS — E03.9 ACQUIRED HYPOTHYROIDISM: Primary | ICD-10-CM

## 2021-05-26 PROCEDURE — 99212 OFFICE O/P EST SF 10 MIN: CPT | Performed by: INTERNAL MEDICINE

## 2021-05-26 NOTE — PROGRESS NOTES
HPI   72 y.o. female with hx of hypothyroidsm here for  new rash for last few days  Pt reports she started itchy, scaly rash on right side of neck since last 3 days. Denies any new chemical contact or new jewerly. No fevers or chills   Did not try any new meds       Review of Systems   as above     Objective   Physical Exam   Vitals:    05/26/21 1419   BP: 125/75   Pulse: 70   Resp: 18         General:  Awake, alert and oriented. Appears to be not in any distress  Mucous Membranes:  Pink , anicteric  Neck: No JVD, no carotid bruit, no thyromegaly  Scaly erythematous rash on right side of neck   Occasional spots on both arms   Chest:  Clear to auscultation bilaterally, no added sounds  Cardiovascular:  RRR S1S2 heard, no murmurs or gallops  Abdomen:  Soft, undistended, non tender, no organomegaly, BS present  Extremities: No edema or cyanosis. Distal pulses well felt  Neurological : grossly normal         Diagnosis Orders   1. Acquired hypothyroidism     2. Irritant contact dermatitis, unspecified trigger     3. Nail fungal infection  COMPREHENSIVE METABOLIC PANEL          Contact dermatitis - add triamcilone cream  Try to figure new chemical    Toe nail fungus- check LFT for lamisil    Nicko Stark MD, MD 5/27/2021 2:37 PM        An electronic signature was used to authenticate this note.     --Nicko Stark MD

## 2021-05-27 LAB
A/G RATIO: 1.4 (ref 1.1–2.2)
ALBUMIN SERPL-MCNC: 4.2 G/DL (ref 3.4–5)
ALP BLD-CCNC: 88 U/L (ref 40–129)
ALT SERPL-CCNC: 10 U/L (ref 10–40)
ANION GAP SERPL CALCULATED.3IONS-SCNC: 13 MMOL/L (ref 3–16)
AST SERPL-CCNC: 22 U/L (ref 15–37)
BILIRUB SERPL-MCNC: 0.4 MG/DL (ref 0–1)
BUN BLDV-MCNC: 15 MG/DL (ref 7–20)
CALCIUM SERPL-MCNC: 10 MG/DL (ref 8.3–10.6)
CHLORIDE BLD-SCNC: 99 MMOL/L (ref 99–110)
CO2: 29 MMOL/L (ref 21–32)
CREAT SERPL-MCNC: 0.8 MG/DL (ref 0.6–1.2)
GFR AFRICAN AMERICAN: >60
GFR NON-AFRICAN AMERICAN: >60
GLOBULIN: 3 G/DL
GLUCOSE BLD-MCNC: 113 MG/DL (ref 70–99)
POTASSIUM SERPL-SCNC: 4.3 MMOL/L (ref 3.5–5.1)
SODIUM BLD-SCNC: 141 MMOL/L (ref 136–145)
TOTAL PROTEIN: 7.2 G/DL (ref 6.4–8.2)

## 2021-05-27 RX ORDER — TERBINAFINE HYDROCHLORIDE 250 MG/1
250 TABLET ORAL DAILY
Qty: 42 TABLET | Refills: 0 | Status: SHIPPED | OUTPATIENT
Start: 2021-05-27 | End: 2021-06-18 | Stop reason: SDUPTHER

## 2021-06-01 ENCOUNTER — TELEPHONE (OUTPATIENT)
Dept: INTERNAL MEDICINE CLINIC | Age: 71
End: 2021-06-01

## 2021-06-01 RX ORDER — DICYCLOMINE HYDROCHLORIDE 10 MG/1
10 CAPSULE ORAL
Qty: 90 CAPSULE | Refills: 0 | Status: ON HOLD | OUTPATIENT
Start: 2021-06-01 | End: 2021-12-23 | Stop reason: HOSPADM

## 2021-06-01 NOTE — TELEPHONE ENCOUNTER
----- Message from Getachew Martin sent at 6/1/2021  4:43 PM EDT -----  Contact: Melany    [phone]  Per Dr BOLAÑOS-Pricilla 10 mg TID before meals #90  ----- Message -----  From: Aakash Rangel  Sent: 6/1/2021   2:51 PM EDT  To: Jere Rojas MD    This is a patient of Dr. Shelby Lovelace . Patient called and is experiencing IBS, and would like to seen. No appointment available. Only same day. Pharmacy she uses if calling in a prescription.   Ryder Lynch    Thank you  BLM

## 2021-06-08 ENCOUNTER — HOSPITAL ENCOUNTER (OUTPATIENT)
Age: 71
Discharge: HOME | End: 2021-06-08
Payer: MEDICARE

## 2021-06-08 PROCEDURE — 83013 H PYLORI (C-13) BREATH: CPT

## 2021-06-09 ENCOUNTER — HOSPITAL ENCOUNTER (OUTPATIENT)
Age: 71
Discharge: HOME | End: 2021-06-09
Payer: MEDICARE

## 2021-06-09 LAB
C DIFF TOXIN/ANTIGEN: NORMAL
CRYPTOSPORIDIUM ANTIGEN STOOL: NORMAL
E HISTOLYTICA ANTIGEN STOOL: NORMAL
GIARDIA ANTIGEN STOOL: NORMAL

## 2021-06-09 PROCEDURE — 87505 NFCT AGENT DETECTION GI: CPT

## 2021-06-09 PROCEDURE — 87324 CLOSTRIDIUM AG IA: CPT

## 2021-06-09 PROCEDURE — 82705 FATS/LIPIDS FECES QUAL: CPT

## 2021-06-09 PROCEDURE — 87328 CRYPTOSPORIDIUM AG IA: CPT

## 2021-06-09 PROCEDURE — 87449 NOS EACH ORGANISM AG IA: CPT

## 2021-06-09 PROCEDURE — 87336 ENTAMOEB HIST DISPR AG IA: CPT

## 2021-06-09 PROCEDURE — 83993 ASSAY FOR CALPROTECTIN FECAL: CPT

## 2021-06-10 LAB
FECAL NEUTRAL FAT: NORMAL
FECAL SPLIT FATS: NORMAL
GI BACTERIAL PATHOGENS BY PCR: NORMAL
H PYLORI BREATH TEST: NEGATIVE

## 2021-06-11 LAB — CALPROTECTIN, FECAL: 15 UG/G

## 2021-06-18 ENCOUNTER — OFFICE VISIT (OUTPATIENT)
Dept: INTERNAL MEDICINE CLINIC | Age: 71
End: 2021-06-18

## 2021-06-18 VITALS
HEART RATE: 70 BPM | HEIGHT: 64 IN | DIASTOLIC BLOOD PRESSURE: 75 MMHG | BODY MASS INDEX: 24.92 KG/M2 | WEIGHT: 146 LBS | SYSTOLIC BLOOD PRESSURE: 125 MMHG | RESPIRATION RATE: 18 BRPM

## 2021-06-18 DIAGNOSIS — E55.9 VITAMIN D DEFICIENCY: ICD-10-CM

## 2021-06-18 DIAGNOSIS — Z13.220 SCREENING FOR HYPERLIPIDEMIA: ICD-10-CM

## 2021-06-18 DIAGNOSIS — E03.9 ACQUIRED HYPOTHYROIDISM: ICD-10-CM

## 2021-06-18 DIAGNOSIS — Z00.00 ROUTINE GENERAL MEDICAL EXAMINATION AT A HEALTH CARE FACILITY: ICD-10-CM

## 2021-06-18 DIAGNOSIS — Z72.89 OTHER PROBLEMS RELATED TO LIFESTYLE: ICD-10-CM

## 2021-06-18 DIAGNOSIS — M80.80XD LOCALIZED OSTEOPOROSIS WITH CURRENT PATHOLOGICAL FRACTURE WITH ROUTINE HEALING, SUBSEQUENT ENCOUNTER: ICD-10-CM

## 2021-06-18 DIAGNOSIS — F32.9 REACTIVE DEPRESSION: ICD-10-CM

## 2021-06-18 DIAGNOSIS — Z12.31 ENCOUNTER FOR SCREENING MAMMOGRAM FOR BREAST CANCER: ICD-10-CM

## 2021-06-18 DIAGNOSIS — Z00.00 MEDICARE ANNUAL WELLNESS VISIT, SUBSEQUENT: Primary | ICD-10-CM

## 2021-06-18 DIAGNOSIS — M81.0 POST-MENOPAUSAL OSTEOPOROSIS: ICD-10-CM

## 2021-06-18 DIAGNOSIS — Z11.59 NEED FOR HEPATITIS C SCREENING TEST: ICD-10-CM

## 2021-06-18 DIAGNOSIS — Z00.00 MEDICARE ANNUAL WELLNESS VISIT, INITIAL: ICD-10-CM

## 2021-06-18 PROCEDURE — G0438 PPPS, INITIAL VISIT: HCPCS | Performed by: INTERNAL MEDICINE

## 2021-06-18 PROCEDURE — 81002 URINALYSIS NONAUTO W/O SCOPE: CPT | Performed by: INTERNAL MEDICINE

## 2021-06-18 RX ORDER — TERBINAFINE HYDROCHLORIDE 250 MG/1
250 TABLET ORAL DAILY
Qty: 90 TABLET | Refills: 0 | Status: SHIPPED | OUTPATIENT
Start: 2021-06-18 | End: 2021-07-30

## 2021-06-18 RX ORDER — LEVOTHYROXINE SODIUM 0.05 MG/1
50 TABLET ORAL DAILY
Qty: 90 TABLET | Refills: 0 | Status: SHIPPED | OUTPATIENT
Start: 2021-06-18 | End: 2021-08-02 | Stop reason: SDUPTHER

## 2021-06-18 ASSESSMENT — PATIENT HEALTH QUESTIONNAIRE - PHQ9
SUM OF ALL RESPONSES TO PHQ9 QUESTIONS 1 & 2: 0
2. FEELING DOWN, DEPRESSED OR HOPELESS: 0
1. LITTLE INTEREST OR PLEASURE IN DOING THINGS: 0
SUM OF ALL RESPONSES TO PHQ QUESTIONS 1-9: 0
SUM OF ALL RESPONSES TO PHQ QUESTIONS 1-9: 0
1. LITTLE INTEREST OR PLEASURE IN DOING THINGS: 0
SUM OF ALL RESPONSES TO PHQ QUESTIONS 1-9: 0
SUM OF ALL RESPONSES TO PHQ QUESTIONS 1-9: 0
SUM OF ALL RESPONSES TO PHQ9 QUESTIONS 1 & 2: 0
2. FEELING DOWN, DEPRESSED OR HOPELESS: 0
SUM OF ALL RESPONSES TO PHQ QUESTIONS 1-9: 0
SUM OF ALL RESPONSES TO PHQ QUESTIONS 1-9: 0

## 2021-06-18 ASSESSMENT — LIFESTYLE VARIABLES: HOW OFTEN DO YOU HAVE A DRINK CONTAINING ALCOHOL: 0

## 2021-06-18 NOTE — PATIENT INSTRUCTIONS
Personalized Preventive Plan for Rody Cisse - 6/18/2021  Medicare offers a range of preventive health benefits. Some of the tests and screenings are paid in full while other may be subject to a deductible, co-insurance, and/or copay. Some of these benefits include a comprehensive review of your medical history including lifestyle, illnesses that may run in your family, and various assessments and screenings as appropriate. After reviewing your medical record and screening and assessments performed today your provider may have ordered immunizations, labs, imaging, and/or referrals for you. A list of these orders (if applicable) as well as your Preventive Care list are included within your After Visit Summary for your review. Other Preventive Recommendations:    · A preventive eye exam performed by an eye specialist is recommended every 1-2 years to screen for glaucoma; cataracts, macular degeneration, and other eye disorders. · A preventive dental visit is recommended every 6 months. · Try to get at least 150 minutes of exercise per week or 10,000 steps per day on a pedometer . · Order or download the FREE \"Exercise & Physical Activity: Your Everyday Guide\" from [employer]. Call 0-920.694.1631 or search [employer] online. · You need 5737-9588 mg of calcium and 2932-0786 IU of vitamin D per day. It is possible to meet your calcium requirement with diet alone, but a vitamin D supplement is usually necessary to meet this goal.  · When exposed to the sun, use a sunscreen that protects against both UVA and UVB radiation with an SPF of 30 or greater. Reapply every 2 to 3 hours or after sweating, drying off with a towel, or swimming. · Always wear a seat belt when traveling in a car. Always wear a helmet when riding a bicycle or motorcycle.

## 2021-06-18 NOTE — PROGRESS NOTES
observation of the patient, evaluation of cognition reveals recent and remote memory intact. General: elderly female, healthy female,  Awake, alert and oriented. Appears to be not in any distress  HEENT - MM clear. TM normal. Pharynx clear  No Submandibular LN palpable  Mucous Membranes:  Pink , anicteric  Neck: No JVD, no carotid bruit, no thyromegaly  Chest:  Clear to auscultation bilaterally, no added sounds  Cardiovascular:  RRR S1S2 heard, no murmurs or gallops  Abdomen:  Soft, undistended, non tender, no organomegaly, BS present  Extremities: No edema or cyanosis. Distal pulses well felt  Neurological : grossly normal  Cn 2 to 12 intact  Coordination normal  Gait steady      Assessment and Plan       Hypothyroid , acquired- on cytomel and synthroid, check TSH and adjust meds   No symptoms of hypo or hyperthryoid    Reaction depression - improved and now off lexapro     Osteoporosis with hx of left femur fracture - had dexa at [facility]  Reports intolerance to forteo and had no benefit with prolia   Repeat testing and consider reclast   Continue vit d supplements - check Vit d levels     Hx of hyperTGL - need repeat labs     Had pna vaccine, recommend pneumovax and recommend covid vaccine      Need eye exam  Had dental exam and  derm exam  No memory issues  Remains active   Need living will         Patient's complete Health Risk Assessment and screening values have been reviewed and are found in Flowsheets. The following problems were reviewed today and where indicated follow up appointments were made and/or referrals ordered. Positive Risk Factor Screenings with Interventions:          General Health and ACP:  General  In general, how would you say your health is?: Very Good  In the past 7 days, have you experienced any of the following?  New or Increased Pain, New or Increased Fatigue, Loneliness, Social Isolation, Stress or Anger?: None of These  Do you get the social and emotional support that you Instructions/AVS.    There are no diagnoses linked to this encounter. The ASCVD Risk score (Lizzette Zhu., et al., 2013) failed to calculate for the following reasons:    Cannot find a previous HDL lab    Cannot find a previous total cholesterol lab        Advance Care Planning   Advanced Care Planning: Discussed the patients choices for care and treatment in case of a health event that adversely affects decision-making abilities. Also discussed the patients long-term treatment options. Reviewed with the patient the [address] [place] [address] Declaration forms  Reviewed the process of designating a competent adult as an Agent (or -in-fact) that could take make health care decisions for the patient if incompetent. Patient was asked to complete the declaration forms, either acknowledge the forms by a public notary or an eligible witness and provide a signed copy to the practice office.   Time spent (minutes): 2 min

## 2021-06-23 DIAGNOSIS — Z13.220 SCREENING FOR HYPERLIPIDEMIA: ICD-10-CM

## 2021-06-23 DIAGNOSIS — Z72.89 OTHER PROBLEMS RELATED TO LIFESTYLE: ICD-10-CM

## 2021-06-23 DIAGNOSIS — E55.9 VITAMIN D DEFICIENCY: ICD-10-CM

## 2021-06-23 DIAGNOSIS — M80.80XD LOCALIZED OSTEOPOROSIS WITH CURRENT PATHOLOGICAL FRACTURE WITH ROUTINE HEALING, SUBSEQUENT ENCOUNTER: ICD-10-CM

## 2021-06-23 DIAGNOSIS — Z11.59 NEED FOR HEPATITIS C SCREENING TEST: ICD-10-CM

## 2021-06-23 DIAGNOSIS — Z00.00 MEDICARE ANNUAL WELLNESS VISIT, INITIAL: ICD-10-CM

## 2021-06-23 LAB
A/G RATIO: 2 (ref 1.1–2.2)
ALBUMIN SERPL-MCNC: 4.3 G/DL (ref 3.4–5)
ALP BLD-CCNC: 78 U/L (ref 40–129)
ALT SERPL-CCNC: 12 U/L (ref 10–40)
ANION GAP SERPL CALCULATED.3IONS-SCNC: 11 MMOL/L (ref 3–16)
AST SERPL-CCNC: 14 U/L (ref 15–37)
BILIRUB SERPL-MCNC: 0.4 MG/DL (ref 0–1)
BILIRUBIN, POC: NORMAL
BLOOD URINE, POC: NORMAL
BUN BLDV-MCNC: 12 MG/DL (ref 7–20)
CALCIUM SERPL-MCNC: 9.2 MG/DL (ref 8.3–10.6)
CHLORIDE BLD-SCNC: 103 MMOL/L (ref 99–110)
CHOLESTEROL, TOTAL: 219 MG/DL (ref 0–199)
CLARITY, POC: NORMAL
CO2: 30 MMOL/L (ref 21–32)
COLOR, POC: NORMAL
CREAT SERPL-MCNC: 0.7 MG/DL (ref 0.6–1.2)
GFR AFRICAN AMERICAN: >60
GFR NON-AFRICAN AMERICAN: >60
GLOBULIN: 2.2 G/DL
GLUCOSE BLD-MCNC: 86 MG/DL (ref 70–99)
GLUCOSE URINE, POC: NORMAL
HDLC SERPL-MCNC: 66 MG/DL (ref 40–60)
HEPATITIS C ANTIBODY INTERPRETATION: NORMAL
KETONES, POC: NORMAL
LDL CHOLESTEROL CALCULATED: 138 MG/DL
LEUKOCYTE EST, POC: NORMAL
NITRITE, POC: NORMAL
PH, POC: NORMAL
POTASSIUM SERPL-SCNC: 3.9 MMOL/L (ref 3.5–5.1)
PROTEIN, POC: NORMAL
SODIUM BLD-SCNC: 144 MMOL/L (ref 136–145)
SPECIFIC GRAVITY, POC: NORMAL
TOTAL PROTEIN: 6.5 G/DL (ref 6.4–8.2)
TRIGL SERPL-MCNC: 76 MG/DL (ref 0–150)
UROBILINOGEN, POC: NORMAL
VITAMIN D 25-HYDROXY: 74 NG/ML
VLDLC SERPL CALC-MCNC: 15 MG/DL

## 2021-06-25 ENCOUNTER — TELEPHONE (OUTPATIENT)
Dept: INTERNAL MEDICINE CLINIC | Age: 71
End: 2021-06-25

## 2021-06-25 NOTE — TELEPHONE ENCOUNTER
----- Message from Robert Adkins MD sent at 6/25/2021  4:48 PM EDT -----  We shall deal with that later  Lamisil can cause stomach issues  ----- Message -----  From: Jasmeet Diaz  Sent: 6/25/2021   2:51 PM EDT  To: Robert Adkins MD    Pt wanting to know what to do about the toe fungus since she is stopping lamisil?  ----- Message -----  From: Robert Adkins MD  Sent: 6/25/2021   2:43 PM EDT  To: Shahnaz Arriaga if it is from lamisil  Stop this med  Take pepcid 20 mg bid x 3 days  ----- Message -----  From: Jasmeet Diaz  Sent: 6/25/2021   1:50 PM EDT  To: Robert Adkins MD    Does pt need to do anything else for stomach pain?  ----- Message -----  From: Robert Adkins MD  Sent: 6/25/2021   1:19 PM EDT  To: Jasmeet Diaz    No vit d level is ok   ----- Message -----  From: Jasmeet Diaz  Sent: 6/25/2021   8:29 AM EDT  To: Robert Adkins MD    Pt states she is still having stomach issues and has been doing what you advised. States she is bloating at night and stomach pain mainly at night. Pt wanting to know if her vit d level could cause this? Please advise.

## 2021-07-30 ENCOUNTER — TELEPHONE (OUTPATIENT)
Dept: INTERNAL MEDICINE CLINIC | Age: 71
End: 2021-07-30

## 2021-07-30 NOTE — TELEPHONE ENCOUNTER
----- Message from Melissa Murguia MD sent at 7/30/2021 12:45 PM EDT -----  Contact: [phone] (H)  Please do  ----- Message -----  From: Wilber Josemanuel  Sent: 7/30/2021  12:25 PM EDT  To: Melissa Murguia MD    Pt called and needs an order put in to [facility] so that she can have a diagnostic breast mammogram. The fax number is [phone].     Thank you

## 2021-08-02 ENCOUNTER — TELEPHONE (OUTPATIENT)
Dept: INTERNAL MEDICINE CLINIC | Age: 71
End: 2021-08-02

## 2021-08-02 DIAGNOSIS — R92.8 ABNORMAL MAMMOGRAM: Primary | ICD-10-CM

## 2021-08-02 RX ORDER — LEVOTHYROXINE SODIUM 0.05 MG/1
50 TABLET ORAL DAILY
Qty: 90 TABLET | Refills: 0 | Status: SHIPPED | OUTPATIENT
Start: 2021-08-02 | End: 2021-11-08

## 2021-08-02 NOTE — TELEPHONE ENCOUNTER
----- Message from Melissa Murguia MD sent at 8/2/2021  8:57 AM EDT -----  Contact: [phone] (H)  Order diagnostic , not sure if insurance will pay  ----- Message -----  From: Bel Ahn  Sent: 7/30/2021   5:01 PM EDT  To: Melissa Murguia MD    Pt requesting diagnostic because on her usual mammograms she normally has to come back for more imaging. Please advise.    -Call pt if cannot do diagnostic, otherwise no reason to call.  ----- Message -----  From: Melissa Murguia MD  Sent: 7/30/2021   4:41 PM EDT  To: Bel Ahn    Call and ask her  Should be screening  ----- Message -----  From: Bel Ahn  Sent: 7/30/2021  12:54 PM EDT  To: Melissa Murguia MD    Should this be diagnostic or screening?  ----- Message -----  From: Melissa Murguia MD  Sent: 7/30/2021  12:45 PM EDT  To: Bel Ahn    Please do  ----- Message -----  From: Wilber Abernathy  Sent: 7/30/2021  12:25 PM EDT  To: Melissa Murguia MD    Pt called and needs an order put in to [facility] so that she can have a diagnostic breast mammogram. The fax number is [phone].     Thank you

## 2021-08-05 RX ORDER — LIOTHYRONINE SODIUM 5 UG/1
5 TABLET ORAL DAILY
Qty: 90 TABLET | Refills: 0 | Status: SHIPPED | OUTPATIENT
Start: 2021-08-05 | End: 2021-11-08

## 2021-08-05 NOTE — TELEPHONE ENCOUNTER
----- Message from Marycruz Mcpherson MD sent at 8/5/2021  9:55 AM EDT -----  [phone] [address] to refill  ----- Message -----  From: Blossom Ramos  Sent: 8/4/2021   2:26 PM EDT  To: Marycruz Mcpherson MD    Pharmacy requesting liothyronine 5 mcg daily. I do not see where you have sent this before. Please advise.     Art Lynch

## 2021-08-09 ENCOUNTER — TELEPHONE (OUTPATIENT)
Dept: INTERNAL MEDICINE CLINIC | Age: 71
End: 2021-08-09

## 2021-08-09 NOTE — TELEPHONE ENCOUNTER
----- Message from Anam Zayas MD sent at 8/9/2021 10:07 AM EDT -----  Then she needs a regular mammo first  ----- Message -----  From: Ash Jones  Sent: 8/9/2021   9:30 AM EDT  To: Anam Zayas MD    [facility] called stating they cannot use the diagnosis abnormal mammogram for diagnostic mammogram as insurance will not cover it. Pt requested the order for the mammogram be diagnostic as she states she has had to come back in the past for additional imaging. Do you want to try a different diagnosis or inform pt she needs to have a screening mammogram? Please advise.   FAX: [phone]

## 2021-11-08 RX ORDER — LEVOTHYROXINE SODIUM 0.05 MG/1
TABLET ORAL
Qty: 90 TABLET | Refills: 0 | Status: SHIPPED | OUTPATIENT
Start: 2021-11-08

## 2021-11-08 RX ORDER — LIOTHYRONINE SODIUM 5 UG/1
TABLET ORAL
Qty: 90 TABLET | Refills: 0 | Status: SHIPPED | OUTPATIENT
Start: 2021-11-08

## 2021-11-23 ENCOUNTER — TELEPHONE (OUTPATIENT)
Dept: INTERNAL MEDICINE CLINIC | Age: 71
End: 2021-11-23

## 2021-11-23 DIAGNOSIS — J06.9 UPPER RESPIRATORY TRACT INFECTION, UNSPECIFIED TYPE: Primary | ICD-10-CM

## 2021-11-23 RX ORDER — AZITHROMYCIN 250 MG/1
250 TABLET, FILM COATED ORAL SEE ADMIN INSTRUCTIONS
Qty: 6 TABLET | Refills: 0 | Status: SHIPPED | OUTPATIENT
Start: 2021-11-23 | End: 2021-11-28

## 2021-11-23 NOTE — TELEPHONE ENCOUNTER
----- Message from Jung Alicia MD sent at 11/23/2021  3:57 PM EST -----  Start on z pack if no allergies  Also try mucinex   See us if not better  ----- Message -----  From: [facility]  Sent: 11/23/2021   2:18 PM EST  To: Jung Alicia MD    Has head cold with headache and dizziness-does have taste and smell.  Can she get an antibiotic  [facility]

## 2021-11-29 ENCOUNTER — TELEPHONE (OUTPATIENT)
Dept: INTERNAL MEDICINE CLINIC | Age: 71
End: 2021-11-29

## 2021-11-29 RX ORDER — BENZONATATE 100 MG/1
100 CAPSULE ORAL 3 TIMES DAILY PRN
Qty: 30 CAPSULE | Refills: 0 | Status: ON HOLD | OUTPATIENT
Start: 2021-11-29 | End: 2021-12-23 | Stop reason: HOSPADM

## 2021-11-29 NOTE — TELEPHONE ENCOUNTER
----- Message from Nyla Flores MD sent at 11/29/2021  1:05 PM EST -----  Contact: [phone] (H)  Quarantine for one more week   Check oxygen saturations couple times a day , should maintain spo2 above 90 %  [facility]    ----- Message -----  From: Mireille Gayle  Sent: 11/29/2021   8:59 AM EST  To: Nyla Flores MD    Pt called and stated that she tested positive for COVID last Tuesday. She was wondering if she can get the antibody infusion and where she can get it done?     Thank you

## 2021-11-30 ENCOUNTER — TELEPHONE (OUTPATIENT)
Dept: INTERNAL MEDICINE CLINIC | Age: 71
End: 2021-11-30

## 2021-11-30 ENCOUNTER — HOSPITAL ENCOUNTER (EMERGENCY)
Age: 71
Discharge: LEFT BEFORE DISCHARGE | End: 2021-11-30

## 2021-11-30 RX ORDER — DEXAMETHASONE 6 MG/1
6 TABLET ORAL
Qty: 10 TABLET | Refills: 0 | Status: ON HOLD | OUTPATIENT
Start: 2021-11-30 | End: 2021-12-23 | Stop reason: HOSPADM

## 2021-11-30 RX ORDER — ALBUTEROL SULFATE 90 UG/1
2 AEROSOL, METERED RESPIRATORY (INHALATION) EVERY 6 HOURS PRN
Qty: 1 EACH | Refills: 0 | Status: SHIPPED | OUTPATIENT
Start: 2021-11-30

## 2021-12-01 ENCOUNTER — APPOINTMENT (OUTPATIENT)
Dept: CT IMAGING | Age: 71
DRG: 177 | End: 2021-12-01
Payer: MEDICARE

## 2021-12-01 ENCOUNTER — HOSPITAL ENCOUNTER (INPATIENT)
Age: 71
LOS: 22 days | Discharge: HOME HEALTH | DRG: 177 | End: 2021-12-23
Attending: EMERGENCY MEDICINE | Admitting: INTERNAL MEDICINE
Payer: MEDICARE

## 2021-12-01 ENCOUNTER — APPOINTMENT (OUTPATIENT)
Dept: GENERAL RADIOLOGY | Age: 71
DRG: 177 | End: 2021-12-01
Payer: MEDICARE

## 2021-12-01 DIAGNOSIS — U07.1 COVID: ICD-10-CM

## 2021-12-01 DIAGNOSIS — U07.1 ACUTE RESPIRATORY DISEASE DUE TO COVID-19 VIRUS: Primary | ICD-10-CM

## 2021-12-01 DIAGNOSIS — J06.9 ACUTE RESPIRATORY DISEASE DUE TO COVID-19 VIRUS: Primary | ICD-10-CM

## 2021-12-01 PROBLEM — J96.00 ACUTE RESPIRATORY FAILURE DUE TO COVID-19 (HCC): Status: ACTIVE | Noted: 2021-12-01

## 2021-12-01 LAB
A/G RATIO: 1.3 (ref 1.1–2.2)
ALBUMIN SERPL-MCNC: 3.5 G/DL (ref 3.4–5)
ALP BLD-CCNC: 270 U/L (ref 40–129)
ALT SERPL-CCNC: 83 U/L (ref 10–40)
ANION GAP SERPL CALCULATED.3IONS-SCNC: 9 MMOL/L (ref 3–16)
AST SERPL-CCNC: 121 U/L (ref 15–37)
BASE EXCESS VENOUS: 3.9 MMOL/L (ref -3–3)
BASOPHILS ABSOLUTE: 0 K/UL (ref 0–0.2)
BASOPHILS RELATIVE PERCENT: 0.4 %
BILIRUB SERPL-MCNC: <0.2 MG/DL (ref 0–1)
BUN BLDV-MCNC: 6 MG/DL (ref 7–20)
C-REACTIVE PROTEIN: 94.2 MG/L (ref 0–5.1)
CALCIUM SERPL-MCNC: 8.4 MG/DL (ref 8.3–10.6)
CARBOXYHEMOGLOBIN: 0.9 % (ref 0–1.5)
CHLORIDE BLD-SCNC: 100 MMOL/L (ref 99–110)
CO2: 31 MMOL/L (ref 21–32)
CREAT SERPL-MCNC: 0.6 MG/DL (ref 0.6–1.2)
D DIMER: 418 NG/ML DDU (ref 0–229)
EOSINOPHILS ABSOLUTE: 0 K/UL (ref 0–0.6)
EOSINOPHILS RELATIVE PERCENT: 0.1 %
GFR AFRICAN AMERICAN: >60
GFR NON-AFRICAN AMERICAN: >60
GLUCOSE BLD-MCNC: 133 MG/DL (ref 70–99)
HCO3 VENOUS: 29.5 MMOL/L (ref 23–29)
HCT VFR BLD CALC: 39.8 % (ref 36–48)
HEMOGLOBIN: 13.4 G/DL (ref 12–16)
LACTIC ACID: 1 MMOL/L (ref 0.4–2)
LYMPHOCYTES ABSOLUTE: 0.8 K/UL (ref 1–5.1)
LYMPHOCYTES RELATIVE PERCENT: 20.1 %
MAGNESIUM: 2 MG/DL (ref 1.8–2.4)
MCH RBC QN AUTO: 31.5 PG (ref 26–34)
MCHC RBC AUTO-ENTMCNC: 33.7 G/DL (ref 31–36)
MCV RBC AUTO: 93.4 FL (ref 80–100)
METHEMOGLOBIN VENOUS: 0.3 %
MONOCYTES ABSOLUTE: 0.1 K/UL (ref 0–1.3)
MONOCYTES RELATIVE PERCENT: 2.8 %
NEUTROPHILS ABSOLUTE: 2.9 K/UL (ref 1.7–7.7)
NEUTROPHILS RELATIVE PERCENT: 76.6 %
O2 SAT, VEN: 85 %
O2 THERAPY: ABNORMAL
PCO2, VEN: 47.7 MMHG (ref 40–50)
PDW BLD-RTO: 13.6 % (ref 12.4–15.4)
PH VENOUS: 7.41 (ref 7.35–7.45)
PLATELET # BLD: 151 K/UL (ref 135–450)
PMV BLD AUTO: 8.6 FL (ref 5–10.5)
PO2, VEN: 49.7 MMHG (ref 25–40)
POTASSIUM REFLEX MAGNESIUM: 3.4 MMOL/L (ref 3.5–5.1)
PROCALCITONIN: 0.12 NG/ML (ref 0–0.15)
RBC # BLD: 4.26 M/UL (ref 4–5.2)
SODIUM BLD-SCNC: 140 MMOL/L (ref 136–145)
TCO2 CALC VENOUS: 31 MMOL/L
TOTAL PROTEIN: 6.1 G/DL (ref 6.4–8.2)
WBC # BLD: 3.8 K/UL (ref 4–11)

## 2021-12-01 PROCEDURE — 85379 FIBRIN DEGRADATION QUANT: CPT

## 2021-12-01 PROCEDURE — 83605 ASSAY OF LACTIC ACID: CPT

## 2021-12-01 PROCEDURE — 83735 ASSAY OF MAGNESIUM: CPT

## 2021-12-01 PROCEDURE — 82803 BLOOD GASES ANY COMBINATION: CPT

## 2021-12-01 PROCEDURE — 6370000000 HC RX 637 (ALT 250 FOR IP): Performed by: NURSE PRACTITIONER

## 2021-12-01 PROCEDURE — 94761 N-INVAS EAR/PLS OXIMETRY MLT: CPT

## 2021-12-01 PROCEDURE — 86140 C-REACTIVE PROTEIN: CPT

## 2021-12-01 PROCEDURE — 36415 COLL VENOUS BLD VENIPUNCTURE: CPT

## 2021-12-01 PROCEDURE — 2580000003 HC RX 258: Performed by: EMERGENCY MEDICINE

## 2021-12-01 PROCEDURE — 99221 1ST HOSP IP/OBS SF/LOW 40: CPT | Performed by: NURSE PRACTITIONER

## 2021-12-01 PROCEDURE — 6360000002 HC RX W HCPCS: Performed by: INTERNAL MEDICINE

## 2021-12-01 PROCEDURE — 80053 COMPREHEN METABOLIC PANEL: CPT

## 2021-12-01 PROCEDURE — 96365 THER/PROPH/DIAG IV INF INIT: CPT

## 2021-12-01 PROCEDURE — 71045 X-RAY EXAM CHEST 1 VIEW: CPT

## 2021-12-01 PROCEDURE — 2580000003 HC RX 258: Performed by: INTERNAL MEDICINE

## 2021-12-01 PROCEDURE — 6370000000 HC RX 637 (ALT 250 FOR IP): Performed by: EMERGENCY MEDICINE

## 2021-12-01 PROCEDURE — 2700000000 HC OXYGEN THERAPY PER DAY

## 2021-12-01 PROCEDURE — 6370000000 HC RX 637 (ALT 250 FOR IP): Performed by: INTERNAL MEDICINE

## 2021-12-01 PROCEDURE — 99285 EMERGENCY DEPT VISIT HI MDM: CPT

## 2021-12-01 PROCEDURE — 1200000000 HC SEMI PRIVATE

## 2021-12-01 PROCEDURE — 71260 CT THORAX DX C+: CPT

## 2021-12-01 PROCEDURE — 87040 BLOOD CULTURE FOR BACTERIA: CPT

## 2021-12-01 PROCEDURE — 6360000002 HC RX W HCPCS: Performed by: EMERGENCY MEDICINE

## 2021-12-01 PROCEDURE — 6360000004 HC RX CONTRAST MEDICATION: Performed by: INTERNAL MEDICINE

## 2021-12-01 PROCEDURE — 84145 PROCALCITONIN (PCT): CPT

## 2021-12-01 PROCEDURE — 85025 COMPLETE CBC W/AUTO DIFF WBC: CPT

## 2021-12-01 RX ORDER — SODIUM CHLORIDE 0.9 % (FLUSH) 0.9 %
5-40 SYRINGE (ML) INJECTION PRN
Status: DISCONTINUED | OUTPATIENT
Start: 2021-12-01 | End: 2021-12-23 | Stop reason: HOSPADM

## 2021-12-01 RX ORDER — 0.9 % SODIUM CHLORIDE 0.9 %
1000 INTRAVENOUS SOLUTION INTRAVENOUS ONCE
Status: COMPLETED | OUTPATIENT
Start: 2021-12-01 | End: 2021-12-01

## 2021-12-01 RX ORDER — BENZONATATE 100 MG/1
100 CAPSULE ORAL 3 TIMES DAILY PRN
Status: DISCONTINUED | OUTPATIENT
Start: 2021-12-01 | End: 2021-12-10

## 2021-12-01 RX ORDER — FLUTICASONE PROPIONATE 50 MCG
2 SPRAY, SUSPENSION (ML) NASAL DAILY
Status: DISCONTINUED | OUTPATIENT
Start: 2021-12-01 | End: 2021-12-01

## 2021-12-01 RX ORDER — M-VIT,TX,IRON,MINS/CALC/FOLIC 27MG-0.4MG
1 TABLET ORAL DAILY
Status: DISCONTINUED | OUTPATIENT
Start: 2021-12-01 | End: 2021-12-23 | Stop reason: HOSPADM

## 2021-12-01 RX ORDER — LIOTHYRONINE SODIUM 5 UG/1
5 TABLET ORAL DAILY
Status: DISCONTINUED | OUTPATIENT
Start: 2021-12-01 | End: 2021-12-23 | Stop reason: HOSPADM

## 2021-12-01 RX ORDER — IPRATROPIUM BROMIDE AND ALBUTEROL SULFATE 2.5; .5 MG/3ML; MG/3ML
1 SOLUTION RESPIRATORY (INHALATION) ONCE
Status: COMPLETED | OUTPATIENT
Start: 2021-12-01 | End: 2021-12-01

## 2021-12-01 RX ORDER — VITAMIN B COMPLEX
2000 TABLET ORAL DAILY
Status: DISCONTINUED | OUTPATIENT
Start: 2021-12-01 | End: 2021-12-02

## 2021-12-01 RX ORDER — ESCITALOPRAM OXALATE 10 MG/1
5 TABLET ORAL DAILY
Status: DISCONTINUED | OUTPATIENT
Start: 2021-12-01 | End: 2021-12-01

## 2021-12-01 RX ORDER — SODIUM CHLORIDE 0.9 % (FLUSH) 0.9 %
5-40 SYRINGE (ML) INJECTION EVERY 12 HOURS SCHEDULED
Status: DISCONTINUED | OUTPATIENT
Start: 2021-12-01 | End: 2021-12-23 | Stop reason: HOSPADM

## 2021-12-01 RX ORDER — LEVOTHYROXINE SODIUM 0.03 MG/1
50 TABLET ORAL DAILY
Status: DISCONTINUED | OUTPATIENT
Start: 2021-12-01 | End: 2021-12-23 | Stop reason: HOSPADM

## 2021-12-01 RX ORDER — SODIUM CHLORIDE 9 MG/ML
25 INJECTION, SOLUTION INTRAVENOUS PRN
Status: DISCONTINUED | OUTPATIENT
Start: 2021-12-01 | End: 2021-12-23 | Stop reason: HOSPADM

## 2021-12-01 RX ORDER — ALBUTEROL SULFATE 90 UG/1
2 AEROSOL, METERED RESPIRATORY (INHALATION) EVERY 6 HOURS PRN
Status: DISCONTINUED | OUTPATIENT
Start: 2021-12-01 | End: 2021-12-12

## 2021-12-01 RX ORDER — ACETAMINOPHEN 650 MG/1
650 SUPPOSITORY RECTAL EVERY 6 HOURS PRN
Status: DISCONTINUED | OUTPATIENT
Start: 2021-12-01 | End: 2021-12-23 | Stop reason: HOSPADM

## 2021-12-01 RX ORDER — ONDANSETRON 2 MG/ML
4 INJECTION INTRAMUSCULAR; INTRAVENOUS EVERY 6 HOURS PRN
Status: DISCONTINUED | OUTPATIENT
Start: 2021-12-01 | End: 2021-12-23 | Stop reason: HOSPADM

## 2021-12-01 RX ORDER — GUAIFENESIN/DEXTROMETHORPHAN 100-10MG/5
5 SYRUP ORAL EVERY 4 HOURS PRN
Status: DISCONTINUED | OUTPATIENT
Start: 2021-12-01 | End: 2021-12-23 | Stop reason: HOSPADM

## 2021-12-01 RX ORDER — DEXAMETHASONE SODIUM PHOSPHATE 10 MG/ML
6 INJECTION, SOLUTION INTRAMUSCULAR; INTRAVENOUS EVERY 24 HOURS
Status: DISCONTINUED | OUTPATIENT
Start: 2021-12-01 | End: 2021-12-02

## 2021-12-01 RX ORDER — ONDANSETRON 4 MG/1
4 TABLET, ORALLY DISINTEGRATING ORAL EVERY 8 HOURS PRN
Status: DISCONTINUED | OUTPATIENT
Start: 2021-12-01 | End: 2021-12-23 | Stop reason: HOSPADM

## 2021-12-01 RX ORDER — POTASSIUM CHLORIDE 750 MG/1
20 TABLET, EXTENDED RELEASE ORAL ONCE
Status: COMPLETED | OUTPATIENT
Start: 2021-12-01 | End: 2021-12-01

## 2021-12-01 RX ORDER — POLYETHYLENE GLYCOL 3350 17 G/17G
17 POWDER, FOR SOLUTION ORAL DAILY PRN
Status: DISCONTINUED | OUTPATIENT
Start: 2021-12-01 | End: 2021-12-23 | Stop reason: HOSPADM

## 2021-12-01 RX ORDER — ACETAMINOPHEN 325 MG/1
650 TABLET ORAL EVERY 6 HOURS PRN
Status: DISCONTINUED | OUTPATIENT
Start: 2021-12-01 | End: 2021-12-23 | Stop reason: HOSPADM

## 2021-12-01 RX ORDER — DICYCLOMINE HYDROCHLORIDE 10 MG/1
10 CAPSULE ORAL
Status: DISCONTINUED | OUTPATIENT
Start: 2021-12-01 | End: 2021-12-01

## 2021-12-01 RX ADMIN — ENOXAPARIN SODIUM 30 MG: 100 INJECTION SUBCUTANEOUS at 22:46

## 2021-12-01 RX ADMIN — IOPAMIDOL 85 ML: 755 INJECTION, SOLUTION INTRAVENOUS at 15:58

## 2021-12-01 RX ADMIN — SODIUM CHLORIDE 1000 ML: 9 INJECTION, SOLUTION INTRAVENOUS at 04:34

## 2021-12-01 RX ADMIN — ONDANSETRON 4 MG: 4 TABLET, ORALLY DISINTEGRATING ORAL at 16:56

## 2021-12-01 RX ADMIN — MULTIPLE VITAMINS W/ MINERALS TAB 1 TABLET: TAB at 11:53

## 2021-12-01 RX ADMIN — GUAIFENESIN AND DEXTROMETHORPHAN 5 ML: 100; 10 SYRUP ORAL at 11:49

## 2021-12-01 RX ADMIN — SODIUM CHLORIDE, PRESERVATIVE FREE 5 ML: 5 INJECTION INTRAVENOUS at 22:45

## 2021-12-01 RX ADMIN — VITAMIN D, TAB 1000IU (100/BT) 2000 UNITS: 25 TAB at 11:48

## 2021-12-01 RX ADMIN — ENOXAPARIN SODIUM 30 MG: 100 INJECTION SUBCUTANEOUS at 11:50

## 2021-12-01 RX ADMIN — LEVOTHYROXINE SODIUM 50 MCG: 0.03 TABLET ORAL at 11:52

## 2021-12-01 RX ADMIN — SODIUM CHLORIDE, PRESERVATIVE FREE 10 ML: 5 INJECTION INTRAVENOUS at 11:54

## 2021-12-01 RX ADMIN — LIOTHYRONINE SODIUM 5 MCG: 5 TABLET ORAL at 16:56

## 2021-12-01 RX ADMIN — POTASSIUM CHLORIDE 20 MEQ: 10 TABLET, EXTENDED RELEASE ORAL at 11:52

## 2021-12-01 RX ADMIN — DEXAMETHASONE SODIUM PHOSPHATE 20 MG: 4 INJECTION, SOLUTION INTRAMUSCULAR; INTRAVENOUS at 05:08

## 2021-12-01 RX ADMIN — IPRATROPIUM BROMIDE AND ALBUTEROL SULFATE 1 AMPULE: .5; 2.5 SOLUTION RESPIRATORY (INHALATION) at 04:36

## 2021-12-01 RX ADMIN — GUAIFENESIN AND DEXTROMETHORPHAN 5 ML: 100; 10 SYRUP ORAL at 16:56

## 2021-12-01 RX ADMIN — BARICITINIB 4 MG: 2 TABLET, FILM COATED ORAL at 22:45

## 2021-12-01 RX ADMIN — GUAIFENESIN AND DEXTROMETHORPHAN 5 ML: 100; 10 SYRUP ORAL at 22:43

## 2021-12-01 RX ADMIN — BENZONATATE 100 MG: 100 CAPSULE ORAL at 11:49

## 2021-12-01 ASSESSMENT — PAIN SCALES - GENERAL: PAINLEVEL_OUTOF10: 2

## 2021-12-01 NOTE — PROGRESS NOTES
Bedside Mobility Assessment Tool (BMAT):     Assessment Level 1- Sit and Shake    1. From a semi-reclined position, ask patient to sit up and rotate to a seated position at the side of the bed. Can use the bedrail. 2. Ask patient to reach out and grab your hand and shake making sure patient reaches across his/her midline. Pass- Patient is able to come to a seated position, maintain core strength. Maintains seated balance while reaching across midline. Move on to Assessment Level 2. Assessment Level 2- Stretch and Point   1. With patient in seated position at the side of the bed, have patient place both feet on the floor (or stool) with knees no higher than hips. 2. Ask patient to stretch one leg and straighten the knee, then bend the ankle/flex and point the toes. If appropriate, repeat with the other leg. Pass- Patient is able to demonstrate appropriate quad strength on intended weight bearing limb(s). Move onto Assessment Level 3. Assessment Level 3- Stand   1. Ask patient to elevate off the bed or chair (seated to standing) using an assistive device (cane, bedrail). 2. Patient should be able to raise buttocks off be and hold for a count of five. May repeat once. Pass- Patient maintains standing stability for at least 5 seconds, proceed to assessment level 4. Assessment Level 4- Walk   1. Ask patient to march in place at bedside. 2. Then ask patient to advance step and return each foot. Some medical conditions may render a patient from stepping backwards, use your best clinical judgement. Fail- Patient not able to complete tasks OR requires use of assistive device. Patient is MOBILITY LEVEL 3. Mobility Level- 3   Pt requires assistance to bedside commode.

## 2021-12-01 NOTE — PROGRESS NOTES
RT Inhaler-Nebulizer Bronchodilator Protocol Note    There is a bronchodilator order in the chart from a provider indicating to follow the RT Bronchodilator Protocol and there is an Initiate RT Inhaler-Nebulizer Bronchodilator Protocol order as well (see protocol at bottom of note). CXR Findings:  XR CHEST PORTABLE    Result Date: 12/1/2021  Multifocal peripherally located pulmonary opacities are seen, likely related to COVID-19 pneumonia. The findings from the last RT Protocol Assessment were as follows:   History Pulmonary Disease: None or smoker <15 pack years  Respiratory Pattern: Regular pattern and RR 12-20 bpm  Breath Sounds: Slightly diminished and/or crackles  Cough: Strong, spontaneous, non-productive  Indication for Bronchodilator Therapy: Decreased or absent breath sounds  Bronchodilator Assessment Score: 2    Aerosolized bronchodilator medication orders have been revised according to the RT Inhaler-Nebulizer Bronchodilator Protocol below. Respiratory Therapist to perform RT Therapy Protocol Assessment initially then follow the protocol. Repeat RT Therapy Protocol Assessment PRN for score 0-3 or on second treatment, BID, and PRN for scores above 3. No Indications - adjust the frequency to every 6 hours PRN wheezing or bronchospasm, if no treatments needed after 48 hours then discontinue using Per Protocol order mode. If indication present, adjust the RT bronchodilator orders based on the Bronchodilator Assessment Score as indicated below. Use Inhaler orders unless patient has one or more of the following: on home nebulizer, not able to hold breath for 10 seconds, is not alert and oriented, cannot activate and use MDI correctly, or respiratory rate 25 breaths per minute or more, then use the equivalent nebulizer order(s) with same Frequency and PRN reasons based on the score. If a patient is on this medication at home then do not decrease Frequency below that used at home.     0-3 - enter or revise RT bronchodilator order(s) to equivalent RT Bronchodilator order with Frequency of every 4 hours PRN for wheezing or increased work of breathing using Per Protocol order mode. 4-6 - enter or revise RT Bronchodilator order(s) to two equivalent RT bronchodilator orders with one order with BID Frequency and one order with Frequency of every 4 hours PRN wheezing or increased work of breathing using Per Protocol order mode. 7-10 - enter or revise RT Bronchodilator order(s) to two equivalent RT bronchodilator orders with one order with TID Frequency and one order with Frequency of every 4 hours PRN wheezing or increased work of breathing using Per Protocol order mode. 11-13 - enter or revise RT Bronchodilator order(s) to one equivalent RT bronchodilator order with QID Frequency and an Albuterol order with Frequency of every 4 hours PRN wheezing or increased work of breathing using Per Protocol order mode. Greater than 13 - enter or revise RT Bronchodilator order(s) to one equivalent RT bronchodilator order with every 4 hours Frequency and an Albuterol order with Frequency of every 2 hours PRN wheezing or increased work of breathing using Per Protocol order mode. RT to enter RT Home Evaluation for COPD & MDI Assessment order using Per Protocol order mode.     Electronically signed by Lourdes Braun RCP on 12/1/2021 at 4:28 PM

## 2021-12-01 NOTE — ACP (ADVANCE CARE PLANNING)
Spiritual Care received referral for continued advance care planning. Decision maker in case of incapacity was clarified by  in ED. Patient is currently being admitted and not available. We will follow-up when patient is settled on the unit. Please page the  on call if we need to see her sooner.

## 2021-12-01 NOTE — H&P
Hospital Medicine History & Physical      PCP: Monica Haddad MD    Date of Admission: 12/1/2021    Date of Service: Pt seen/examined on 12/1/2021     Chief Complaint:    Chief Complaint   Patient presents with    Shortness of Breath     Pt presents to ED with complaints of increased SOB. States she is covid + and in currently on day 8 since being diagnosed. EMS gave 1 duoneb in route. History Of Present Illness: The patient is a 80 y.o. female with hypothyroidism, osteopenia, vitamin D Deficiency, and depression who presents to [facility] with c/o shortness of breath. She was diagnosed with COVID 9 days ago. Her SpO2 was in the 70's when she checked. She reports she had fevers. She has been using Tylenol/Motrin for relief of fevers. She feels tired and weak. She c/o cough and shortness of breath. She states she has coughed up blood. Denies chest pain. She had some nausea and diarrhea. She lost her sense of taste/smell. She has a poor appetite. Patient was on 15 L, now on 6 L. Labs with mild hypokalemia, elevated LFT's, leukopenia. CXR consistent with COVID pneumonia. Admitted to med-surg. Pulmonology consulted. Past Medical History:        Diagnosis Date    Depression     Ischial bursitis     Osteopenia     Thyroid disease     Vitamin D deficiency        Past Surgical History:        Procedure Laterality Date    ELBOW SURGERY      HYSTERECTOMY      SHOULDER SURGERY      Right       Medications Prior to Admission:    Prior to Admission medications    Medication Sig Start Date End Date Taking? Authorizing Provider   levothyroxine (SYNTHROID) 50 MCG tablet Take 1 tablet by mouth once daily 11/8/21  Yes Monica Haddad MD   liothyronine (CYTOMEL) 5 MCG tablet Take 1 tablet by mouth once daily 11/8/21  Yes Monica Haddad MD   Vitamin D (CHOLECALCIFEROL) 1000 UNITS CAPS capsule Take 2,000 Units by mouth daily.    Yes Historical Provider, MD   albuterol sulfate HFA (VENTOLIN HFA) 108 (90 Base) MCG/ACT inhaler Inhale 2 puffs into the lungs every 6 hours as needed for Wheezing 11/30/21   Jesus Quintero MD   dexamethasone (DECADRON) 6 MG tablet Take 1 tablet by mouth daily (with breakfast) for 10 days 11/30/21 12/10/21  Jesus Quintero MD   benzonatate (TESSALON PERLES) 100 MG capsule Take 1 capsule by mouth 3 times daily as needed for Cough 11/29/21 12/9/21  Jesus Quintero MD   dicyclomine (BENTYL) 10 MG capsule Take 1 capsule by mouth 3 times daily (before meals) 6/1/21   Jesus Quintero MD   fluticasone [facility]) 50 MCG/ACT nasal spray 2 sprays by Each Nostril route daily 4/26/21   Jesus Quintero MD   Multiple Vitamins-Minerals (CENTRUM SILVER PO) Take  by mouth. Historical Provider, MD   LIOTHYRONINE SODIUM by Does not apply route. Historical Provider, MD   escitalopram (LEXAPRO) 5 MG tablet Take 5 mg by mouth daily. Historical Provider, MD       Allergies: Forteo [parathyroid hormone (recomb)], Atarax [hydroxyzine], Codeine, and Vicodin [hydrocodone-acetaminophen]    Social History:     TOBACCO:   reports that she has never smoked. She has never used smokeless tobacco.  ETOH:   reports no history of alcohol use.       Family History:   Positive as follows:        Problem Relation Age of Onset   Brianna Roca Cancer Mother         brain   Brianna Roca Cancer Sister         breast    Allergy (Severe) Sister     High Blood Pressure Sister     Other Father         MI    Heart Disease Father     Diabetes Maternal Cousin     Allergy (Severe) Sister     High Blood Pressure Sister     Allergy (Severe) Sister     High Blood Pressure Sister     High Blood Pressure Daughter        REVIEW OF SYSTEMS:       Constitutional: + fever, fatigue, poor appetite  Respiratory: + dyspnea, cough, hemoptysis   Cardiovascular: Negative for chest pain   Gastrointestinal: Negative for vomiting, + nausea, diarrhea (resolved now)  Genitourinary: Negative for hematuria   Musculoskeletal: Negative for arthralgias + weakness  Skin: Negative for rash   Neurological: Negative for syncope    Psychiatric/Behavorial: Negative for anxiety    PHYSICAL EXAM:    BP (!) 111/53   Pulse 82   Temp 99.4 °F (37.4 °C) (Oral)   Resp 18   Ht 5' 4\" (1.626 m)   Wt 140 lb (63.5 kg)   SpO2 90%   BMI 24.03 kg/m²     Gen: No distress. Alert. Eyes: PERRL. No sclera icterus. No conjunctival injection. ENT: No discharge. Pharynx clear. Neck: Trachea midline. Resp: No accessory muscle use. + BiBasilar crackles. No wheezes. No rhonchi. CV: Regular rate. Regular rhythm. No murmur. No rub. No edema. GI: Non-tender. Non-distended. Normal bowel sounds. No hernia. Skin: Warm and dry. No nodule on exposed extremities. No rash on exposed extremities. M/S: No cyanosis. No joint deformity. No clubbing. Neuro: Awake. Grossly nonfocal    Psych: Oriented x 3. No anxiety or agitation. CBC:   Recent Labs     12/01/21  0430   WBC 3.8*   HGB 13.4   HCT 39.8   MCV 93.4        BMP:   Recent Labs     12/01/21  0430      K 3.4*      CO2 31   BUN 6*   CREATININE 0.6     LIVER PROFILE:   Recent Labs     12/01/21  0430   *   ALT 83*   BILITOT <0.2   ALKPHOS 270*       CULTURES  Blood: pending    EKG:  I have reviewed the EKG with the following interpretation:   N/A    RADIOLOGY  XR CHEST PORTABLE   Final Result   Multifocal peripherally located pulmonary opacities are seen, likely related   to COVID-19 pneumonia. Active Problems:    Acute respiratory failure due to COVID-19 [facility])  Resolved Problems:    * No resolved hospital problems. *        ASSESSMENT/PLAN:  Acute hypoxic respiratory failure  COVID-19  - admit to med-surg. Pulmonology consulted  - was on 15 L. Currently weaned to 6 L  - Decadron D#1  - Robitussin prn, Albuterol prn  - she does not want Intubation or Remdesivir. Hypokalemia  - replace.  Monitor BMP    Elevated LFT's  - likely related to [facility] LFT's    Leukopenia   - likely related to Ruba Morris. Monitor CBC    Hypothyroidism  - home dose of synthroid 50 mcg; Cytomel 5 mcg    Depression  - continue home medication. DVT Prophylaxis: Lovenox 30 mg BID  Diet: ADULT DIET; Regular  Code Status: Full Code    Patient does not want Intubation or Remdesivir.      Janece Credit RAUDEL  12/1/2021

## 2021-12-01 NOTE — ED NOTES
Spoke with Art, RN. OK to sent pt to [facility] ED. Will set up transport at this time.       Kristine Estes RN  12/01/21 [id number]

## 2021-12-01 NOTE — PROGRESS NOTES
Patient transported to 2W bed 231 via transportation with RN at bedside. No s/s distress upon transfer, report given to [facility].  Patient was transported on telemetry monitor

## 2021-12-01 NOTE — ACP (ADVANCE CARE PLANNING)
Advance Care Planning     General Advance Care Planning (ACP) Conversation    Date of Conversation: 12/1/2021  Conducted with: Patient with Decision Making Capacity    Healthcare Decision Maker:    Primary Decision Maker: Padmini Solorzano - [phone]  Click here to complete [address] including selection of the Healthcare Decision Maker Relationship (ie \"Primary\"). Today we documented Decision Maker(s) consistent with Legal Next of Kin hierarchy. Content/Action Overview:  Has NO ACP documents/care preferences - refer to ACP Clinical Specialist    resuscitation preferences  Pt states that she does not want to be placed on a ventilator and she does not want to be given Remdesivir    Referral made to Pastoral Care at pt request for ACP discussion. Note forwarded to Mary Petty NP and Dr. Andrews Murrell.     Length of Voluntary ACP Conversation in minutes:  <16 minutes (Non-Billable)    Jesús Zhu RN

## 2021-12-01 NOTE — ED NOTES
Breathing treatment completed pt placed on 6 liters nasal canula.      Sarath Mittal RN  12/01/21 [id number]

## 2021-12-01 NOTE — PROGRESS NOTES
Patient admitted to bed 18 in ED from mobile transport. Patient oriented to room, call light, bed rails, phone, lights and bathroom. Patient instructed about the schedule of the day including: vital sign frequency, lab draws, possible tests, frequency of MD and staff rounds, daily weights, I &O's and prescribed diet. Bed alarm deferred patient low fall risk and refuses alarm. Telemetry leads in place, patient aware of placement and reason. Bed locked, in lowest position, side rails up 2/4, call light within reach.           Primary Nurse eSignature: Electronically signed by Veronica Neil RN on 12/1/21 at 11:57 AM EST

## 2021-12-01 NOTE — PROGRESS NOTES
Patient admitted to room 231 from ER. Patient oriented to room, call light, bed rails, phone, lights and bathroom. Patient instructed about the schedule of the day including: vital sign frequency, lab draws, possible tests, frequency of MD and staff rounds, daily weights, I &O's and prescribed diet. Bed alarm deferred patient low fall risk and refuses alarm. Telemetry box in place, patient aware of placement and reason. Bed locked, in lowest position, side rails up 2/4, call light within reach. Recliner Assessment:     Patient is able to demonstrate the ability to move from a reclining position to an upright position within the recliner. 4 Eyes Skin Assessment     The patient is being assess for   Admission    I agree that 2 RN's have performed a thorough Head to Toe Skin Assessment on the patient. ALL assessment sites listed below have been assessed. Areas assessed for pressure by both nurses:   [x]   Head, Face, and Ears   [x]   Shoulders, Back, and Chest, Abdomen  [x]   Arms, Elbows, and Hands   [x]   Coccyx, Sacrum, and Ischium  [x]   Legs, Feet, and Heels        Pt has scattered bruising. And varicose veins to BLE. Skin Assessed Under all Medical Devices by both nurses:  O2 device tubing              All Mepilex Borders were peeled back and area peeked at by both nurses:  Yes  Please list where Mepilex Borders are located:  N/A             **SHARE this note so that the co-signing nurse is able to place an eSignature**    Co-signer eSignature: Electronically signed by Shirley Flynn RN on 12/1/21 at 4:12 PM EST    Does the Patient have Skin Breakdown related to pressure?   No     (Insert Photo hereN/A)         Bowen Prevention initiated:  No   Wound Care Orders initiated:  NA      [facility] nurse consulted for Pressure Injury (Stage 3,4, Unstageable, DTI, NWPT, Complex wounds)and New or Established Ostomies:  No      Primary Nurse eSignature: Electronically signed by Joe Medeiros RN on 12/1/21 at 4:10 PM EST

## 2021-12-01 NOTE — ED PROVIDER NOTES
Emergency Department Attending Note    Suzanna Michaels MD    Date of ED VIsit: 12/1/2021    CHIEF COMPLAINT  Shortness of Breath (Pt presents to ED with complaints of increased SOB. States she is covid + and in currently on day 8 since being diagnosed. EMS gave 1 duoneb in route.)      HISTORY OF PRESENT ILLNESS  Arthur Butler is an unvaccinated 80 y.o. female  With Vital signs of /65   Pulse 94   Temp 99.4 °F (37.4 °C) (Oral)   Resp 22   Ht 5' 4\" (1.626 m)   Wt 140 lb (63.5 kg)   SpO2 94%   BMI 24.03 kg/m²  who presents to the ED with a complaint of shortness of breath. Patient seen and evaluated in room 2. Patient was diagnosed with Covid 2 weeks ago with a positive Covid test.  Since then she has been doing fine until the this evening when she started to get more short of breath. She says she tested her pulse oximetry level and it was found to be in the seventies. But got progressively better. Upon arrival here in the emergency department her pulse oximetry reading is 94% on a nonrebreather mask. She denies any chest pain no leg swelling. Patient was treated with 1 DuoNeb on the way into the emergency department by EMS. No other complaints, modifying factors or associated symptoms. Patients Past medical history reviewed and listed below  Past Medical History:   Diagnosis Date    Depression     Ischial bursitis     Osteopenia     Thyroid disease     Vitamin D deficiency      Past Surgical History:   Procedure Laterality Date    ELBOW SURGERY      HYSTERECTOMY      SHOULDER SURGERY      Right       I have reviewed the following from the nursing documentation.     Family History   Problem Relation Age of Onset   Wilhelminia Blazing Cancer Mother         brain    Cancer Sister         breast    Allergy (Severe) Sister     High Blood Pressure Sister     Other Father         MI    Heart Disease Father     Diabetes Maternal Cousin     Allergy (Severe) Sister     High Blood Pressure Sister     Allergy (Severe) Sister     High Blood Pressure Sister     High Blood Pressure Daughter      Social History     Socioeconomic History    Marital status:      Spouse name: Not on file    Number of children: Not on file    Years of education: Not on file    Highest education level: Not on file   Occupational History    Not on file   Tobacco Use    Smoking status: Never Smoker    Smokeless tobacco: Never Used   Vaping Use    Vaping Use: Never used   Substance and Sexual Activity    Alcohol use: No    Drug use: No    Sexual activity: Not on file   Other Topics Concern    Not on file   Social History Narrative    Not on file     Social Determinants of Health     Financial Resource Strain:     Difficulty of Paying Living Expenses: Not on file   Food Insecurity:     Worried About Running Out of Food in the Last Year: Not on file    Chiqui of Food in the Last Year: Not on file   Transportation Needs:     Lack of Transportation (Medical): Not on file    Lack of Transportation (Non-Medical):  Not on file   Physical Activity:     Days of Exercise per Week: Not on file    Minutes of Exercise per Session: Not on file   Stress:     Feeling of Stress : Not on file   Social Connections:     Frequency of Communication with Friends and Family: Not on file    Frequency of Social Gatherings with Friends and Family: Not on file    Attends Sikh Services: Not on file    Active Member of [address] [place] [address] [employer] or Organizations: Not on file    Attends Club or Organization Meetings: Not on file    Marital Status: Not on file   Intimate Partner Violence:     Fear of Current or Ex-Partner: Not on file    Emotionally Abused: Not on file    Physically Abused: Not on file    Sexually Abused: Not on file   Housing Stability:     Unable to Pay for Housing in the Last Year: Not on file    Number of [place] in the Last Year: Not on file    Unstable Housing in the Last Year: Not on file     Current Facility-Administered Medications   Medication Dose Route Frequency Provider Last Rate Last Admin    0.9 % sodium chloride bolus  1,000 mL IntraVENous Once Merari Grande MD         Current Outpatient Medications   Medication Sig Dispense Refill    levothyroxine (SYNTHROID) 50 MCG tablet Take 1 tablet by mouth once daily 90 tablet 0    liothyronine (CYTOMEL) 5 MCG tablet Take 1 tablet by mouth once daily 90 tablet 0    Vitamin D (CHOLECALCIFEROL) 1000 UNITS CAPS capsule Take 2,000 Units by mouth daily.  albuterol sulfate HFA (VENTOLIN HFA) 108 (90 Base) MCG/ACT inhaler Inhale 2 puffs into the lungs every 6 hours as needed for Wheezing 1 each 0    dexamethasone (DECADRON) 6 MG tablet Take 1 tablet by mouth daily (with breakfast) for 10 days 10 tablet 0    benzonatate (TESSALON PERLES) 100 MG capsule Take 1 capsule by mouth 3 times daily as needed for Cough 30 capsule 0    dicyclomine (BENTYL) 10 MG capsule Take 1 capsule by mouth 3 times daily (before meals) 90 capsule 0    fluticasone (FLONASE) 50 MCG/ACT nasal spray 2 sprays by Each Nostril route daily 3 Bottle 0    Multiple Vitamins-Minerals (CENTRUM SILVER PO) Take  by mouth.  LIOTHYRONINE SODIUM by Does not apply route.  escitalopram (LEXAPRO) 5 MG tablet Take 5 mg by mouth daily. Allergies   Allergen Reactions    Forteo [Parathyroid Hormone (Recomb)] Shortness Of Breath, Rash and Other (See Comments)     Boils under arms and upper thigh    Atarax [Hydroxyzine]     Codeine Nausea Only    Vicodin [Hydrocodone-Acetaminophen]        REVIEW OF SYSTEMS  10 systems reviewed, pertinent positives per HPI otherwise noted to be negative     PHYSICAL EXAM  /65   Pulse 94   Temp 99.4 °F (37.4 °C) (Oral)   Resp 22   Ht 5' 4\" (1.626 m)   Wt 140 lb (63.5 kg)   SpO2 94%   BMI 24.03 kg/m²   GENERAL APPEARANCE: Awake and alert. Cooperative. In mild to moderate respiratory distress. With an O2 requirement  HEAD: Normocephalic. Atraumatic. EYES: PERRL. EOM's grossly intact. ENT: Mucous membranes are pink and moist.   NECK: Supple. HEART: RRR. No murmurs. LUNGS: Respirations unlabored. CTAB. Good air exchange. Diffuse mild wheezing with good forced expiratory cough  ABDOMEN: Soft. Non-distended. Non-tender. No masses. No organomegaly. No guarding or rebound. EXTREMITIES: No peripheral edema. Moves all extremities equally. All extremities neurovascularly intact. SKIN: Warm and dry. No acute rashes. NEUROLOGICAL: Alert and oriented. Strength 5/5, sensation intact. Gait normal.   PSYCHIATRIC: Normal mood and affect. No HI or SI expressed to me. RADIOLOGY    If acquired see below     EKG:     If acquired see below       ED COURSE/MDM    Patient is obviously requiring oxygen to maintain her saturation in the context of a Covid pneumonia so she will need to be admitted to the hospital.  Patient's first choice of hospital is [facility]    ED Course as of 12/01/21 0611   Wed Dec 01, 2021   0436 Patient seen on arrival [DL]   0443 Blood Gas, Venous(!):    pH, Stewart 7.409   pCO2, Stewart 47.7   pO2, Stewart 49.7(!)   HCO3, Venous 29.24505 [facility] Excess, Stewart 3.9(!)   O2 Sat, Stewart 85   Carboxyhemoglobin 0.9   MetHgb, Stewart 0.3   TC02 (Calc), Stewart 31   O2 Therapy Unknown  VBG reveals a pH of 7.49 PCO2 47 PO2 of 49.7 with on 15 L mask [DL]   5494 IMPRESSION:  Multifocal peripherally located pulmonary opacities are seen, likely related  to COVID-19 pneumonia.  [DL]   [id number] Patient had a O2 saturation of 88% on 6 L nasal cannula [DL]   0458 She is now 92 on 6 L nasal cannula [DL]   0502 Comprehensive Metabolic Panel w/ Reflex to MG(!):    Sodium 140   Potassium 3.4(!)   Chloride 100   CO2 31   Anion Gap 9   Glucose 133(!)   BUN 6(!)   Creatinine 0.6   GFR Non- >60   GFR African American >60   Calcium 8.4   Total Protein 6.1(!)   Albumin 3.5   Albumin/Globulin Ratio 1.3   Bilirubin <0.2   Alk Phos 270(!)   ALT 83(!)   AST 121(!)  Comprehensive metabolic panel revealed a sodium of 140 a potassium of 3.4 glucose of 133 BUN of 6 and a creatinine of 1.6 with a resultant GFR greater than 60 alk phos of 270 AST and ALT are both mildly elevated. [DL]   0502 CBC Auto Differential(!):    WBC 3.8(!)   RBC 4.26   Hemoglobin Quant 13.4   Hematocrit 39.8   MCV 93.4   MCH 31.5   MCHC 33.7   RDW 13.6   Platelet Count 927   MPV 8.6   Neutrophils % 76.6   Lymphocyte % 20.1   Monocytes % 2.8   Eosinophils % 0.1   Basophils % 0.4   Neutrophils Absolute 2.9   Lymphocytes Absolute 0.8(!)   Monocytes Absolute 0.1   Eosinophils Absolute 0.0   Basophils Absolute 0.0  CBC revealed a white count of 3.8 with an H&H of 13 and 39 with a platelet count of 637 [DL]   0503 Lactic Acid, Plasma:    Lactic Acid 1.0  Lactic acid was 1.0 [DL]   0548 Patient was accepted to [facility] by Dr. Greg Travis for [DL]      ED Course User Index  [DL] Hitesh Chiu MD       The ED course and plan were reviewed and results discussed with the patient    The patient understood and agreed with the Discharge/transfer planning. CLINICAL IMPRESSION and DISPOSITION    Carmelina Fabry was stable and diagnosed with  COVID-19 positive test (U07.1, COVID-19) with Acute Pneumonia (J12.89, Other viral pneumonia)  (If respiratory failure or sepsis present, add as separate assessment)        Patient was treated with Decadron 20 mg    CRITICAL CARE TIME:   CRITICAL CARE NOTE:    I spent 45 minutes on the critical care of the patient since this illness of  COVID-19 positive test (U07.1, COVID-19) with Acute Pneumonia (J12.89, Other viral pneumonia)  (If respiratory failure or sepsis present, add as separate assessment) Acutely impaired one or more vital organ systems such that there was a high probability of imminent or life threatening deterioration of the patient's condition. This time includes discussion regarding the patients condition with paramedics, nurses, consultants and the family.  It also includes  a review of computer record data, interpretation of all test results and time spent charting. It does not include time spent on separately reported billable procedures.                        Suzanna Michaels MD  12/01/21 9632       Suzanna Michaels MD  12/01/21 9156

## 2021-12-01 NOTE — ED NOTES
Bedside comode placed in pt room for easy access. Pt denies any other needs at this time.      Tom Delaney RN  12/01/21 5543

## 2021-12-01 NOTE — ED NOTES
Pt given water per her request. Pt resting comfortably. Will continue to monitor.       Pam Navas RN  12/01/21 0452

## 2021-12-02 ENCOUNTER — TELEPHONE (OUTPATIENT)
Dept: INTERNAL MEDICINE CLINIC | Age: 71
End: 2021-12-02

## 2021-12-02 PROBLEM — E03.9 ACQUIRED HYPOTHYROIDISM: Status: ACTIVE | Noted: 2021-12-02

## 2021-12-02 LAB
A/G RATIO: 1.1 (ref 1.1–2.2)
ALBUMIN SERPL-MCNC: 3.1 G/DL (ref 3.4–5)
ALP BLD-CCNC: 215 U/L (ref 40–129)
ALT SERPL-CCNC: 62 U/L (ref 10–40)
ANION GAP SERPL CALCULATED.3IONS-SCNC: 10 MMOL/L (ref 3–16)
AST SERPL-CCNC: 88 U/L (ref 15–37)
BASOPHILS ABSOLUTE: 0 K/UL (ref 0–0.2)
BASOPHILS RELATIVE PERCENT: 0.1 %
BILIRUB SERPL-MCNC: <0.2 MG/DL (ref 0–1)
BUN BLDV-MCNC: 7 MG/DL (ref 7–20)
C-REACTIVE PROTEIN: 59.8 MG/L (ref 0–5.1)
CALCIUM SERPL-MCNC: 8.4 MG/DL (ref 8.3–10.6)
CHLORIDE BLD-SCNC: 105 MMOL/L (ref 99–110)
CO2: 30 MMOL/L (ref 21–32)
CREAT SERPL-MCNC: <0.5 MG/DL (ref 0.6–1.2)
D DIMER: 478 NG/ML DDU (ref 0–229)
EOSINOPHILS ABSOLUTE: 0 K/UL (ref 0–0.6)
EOSINOPHILS RELATIVE PERCENT: 0 %
GFR AFRICAN AMERICAN: >60
GFR NON-AFRICAN AMERICAN: >60
GLUCOSE BLD-MCNC: 128 MG/DL (ref 70–99)
GLUCOSE BLD-MCNC: 130 MG/DL (ref 70–99)
GLUCOSE BLD-MCNC: 133 MG/DL (ref 70–99)
GLUCOSE BLD-MCNC: 145 MG/DL (ref 70–99)
GLUCOSE BLD-MCNC: 146 MG/DL (ref 70–99)
HCT VFR BLD CALC: 39.7 % (ref 36–48)
HEMOGLOBIN: 13.5 G/DL (ref 12–16)
LYMPHOCYTES ABSOLUTE: 1 K/UL (ref 1–5.1)
LYMPHOCYTES RELATIVE PERCENT: 20 %
MCH RBC QN AUTO: 32 PG (ref 26–34)
MCHC RBC AUTO-ENTMCNC: 33.9 G/DL (ref 31–36)
MCV RBC AUTO: 94.3 FL (ref 80–100)
MONOCYTES ABSOLUTE: 0.3 K/UL (ref 0–1.3)
MONOCYTES RELATIVE PERCENT: 7 %
NEUTROPHILS ABSOLUTE: 3.5 K/UL (ref 1.7–7.7)
NEUTROPHILS RELATIVE PERCENT: 72.9 %
PDW BLD-RTO: 13.8 % (ref 12.4–15.4)
PERFORMED ON: ABNORMAL
PLATELET # BLD: 194 K/UL (ref 135–450)
PMV BLD AUTO: 8.6 FL (ref 5–10.5)
POTASSIUM REFLEX MAGNESIUM: 4 MMOL/L (ref 3.5–5.1)
RBC # BLD: 4.21 M/UL (ref 4–5.2)
SODIUM BLD-SCNC: 145 MMOL/L (ref 136–145)
TOTAL PROTEIN: 6 G/DL (ref 6.4–8.2)
WBC # BLD: 4.7 K/UL (ref 4–11)

## 2021-12-02 PROCEDURE — 2000000000 HC ICU R&B

## 2021-12-02 PROCEDURE — 36415 COLL VENOUS BLD VENIPUNCTURE: CPT

## 2021-12-02 PROCEDURE — 94761 N-INVAS EAR/PLS OXIMETRY MLT: CPT

## 2021-12-02 PROCEDURE — 80053 COMPREHEN METABOLIC PANEL: CPT

## 2021-12-02 PROCEDURE — 51702 INSERT TEMP BLADDER CATH: CPT

## 2021-12-02 PROCEDURE — 99291 CRITICAL CARE FIRST HOUR: CPT | Performed by: INTERNAL MEDICINE

## 2021-12-02 PROCEDURE — 6370000000 HC RX 637 (ALT 250 FOR IP): Performed by: NURSE PRACTITIONER

## 2021-12-02 PROCEDURE — 6360000002 HC RX W HCPCS: Performed by: INTERNAL MEDICINE

## 2021-12-02 PROCEDURE — 6370000000 HC RX 637 (ALT 250 FOR IP): Performed by: INTERNAL MEDICINE

## 2021-12-02 PROCEDURE — 85379 FIBRIN DEGRADATION QUANT: CPT

## 2021-12-02 PROCEDURE — 2580000003 HC RX 258: Performed by: INTERNAL MEDICINE

## 2021-12-02 PROCEDURE — 99233 SBSQ HOSP IP/OBS HIGH 50: CPT | Performed by: INTERNAL MEDICINE

## 2021-12-02 PROCEDURE — 86140 C-REACTIVE PROTEIN: CPT

## 2021-12-02 PROCEDURE — 2700000000 HC OXYGEN THERAPY PER DAY

## 2021-12-02 PROCEDURE — 85025 COMPLETE CBC W/AUTO DIFF WBC: CPT

## 2021-12-02 RX ORDER — DEXAMETHASONE SODIUM PHOSPHATE 10 MG/ML
6 INJECTION, SOLUTION INTRAMUSCULAR; INTRAVENOUS ONCE
Status: COMPLETED | OUTPATIENT
Start: 2021-12-02 | End: 2021-12-02

## 2021-12-02 RX ORDER — FAMOTIDINE 20 MG/1
20 TABLET, FILM COATED ORAL 2 TIMES DAILY
Status: DISCONTINUED | OUTPATIENT
Start: 2021-12-02 | End: 2021-12-23 | Stop reason: HOSPADM

## 2021-12-02 RX ORDER — ERGOCALCIFEROL 1.25 MG/1
50000 CAPSULE ORAL WEEKLY
Status: DISCONTINUED | OUTPATIENT
Start: 2021-12-02 | End: 2021-12-23 | Stop reason: HOSPADM

## 2021-12-02 RX ADMIN — ERGOCALCIFEROL 50000 UNITS: 1.25 CAPSULE ORAL at 11:10

## 2021-12-02 RX ADMIN — ENOXAPARIN SODIUM 30 MG: 100 INJECTION SUBCUTANEOUS at 07:42

## 2021-12-02 RX ADMIN — FAMOTIDINE 20 MG: 20 TABLET ORAL at 11:10

## 2021-12-02 RX ADMIN — SODIUM CHLORIDE, PRESERVATIVE FREE 10 ML: 5 INJECTION INTRAVENOUS at 20:57

## 2021-12-02 RX ADMIN — FAMOTIDINE 20 MG: 20 TABLET ORAL at 20:57

## 2021-12-02 RX ADMIN — BENZONATATE 100 MG: 100 CAPSULE ORAL at 01:11

## 2021-12-02 RX ADMIN — DEXAMETHASONE SODIUM PHOSPHATE 6 MG: 10 INJECTION INTRAMUSCULAR; INTRAVENOUS at 07:42

## 2021-12-02 RX ADMIN — DEXAMETHASONE SODIUM PHOSPHATE 6 MG: 10 INJECTION INTRAMUSCULAR; INTRAVENOUS at 06:09

## 2021-12-02 RX ADMIN — SODIUM CHLORIDE, PRESERVATIVE FREE 10 ML: 5 INJECTION INTRAVENOUS at 07:43

## 2021-12-02 RX ADMIN — LEVOTHYROXINE SODIUM 50 MCG: 0.03 TABLET ORAL at 06:09

## 2021-12-02 RX ADMIN — LIOTHYRONINE SODIUM 5 MCG: 5 TABLET ORAL at 08:14

## 2021-12-02 RX ADMIN — ENOXAPARIN SODIUM 30 MG: 100 INJECTION SUBCUTANEOUS at 20:57

## 2021-12-02 ASSESSMENT — PAIN SCALES - GENERAL: PAINLEVEL_OUTOF10: 0

## 2021-12-02 NOTE — PROGRESS NOTES
Patient called out stating that she is having 3/10 chest pain. On assessment she is lying curled up in a ball in a side lying position and looks extremely uncomfortable. Patient repositions to the supine position and the patient verbalized complete relief of her pain 0/10.  Electronically signed by Hanane Cartwright RN on 12/2/2021 at 7:00 PM

## 2021-12-02 NOTE — CARE COORDINATION
Case Management Assessment  Initial Evaluation      Patient Name: Cleo Garcia  YOB: 1950  Diagnosis: Acute respiratory disease due to COVID-19 virus [U07.1, J06.9]  Acute respiratory failure due to COVID-19 [facility]) [U07.1, J96.00]  Date / Time: 12/1/2021  4:32 AM    Admission status/Date:inpt  Chart Reviewed: Yes      Patient Interviewed: No   Family Interviewed:  Yes - pt's daughter,Fritz      Hospitalization in the last 30 days:  No      Health Care Decision Maker :   Primary Decision Maker: Sadiq Brooks - Child - [phone]    Secondary Decision Maker: Maria Teresa Morris - Child - [phone]    (CM - must 1st enter selection under Navigator - emergency contact- [place] Relationship and pick relationship)   Who do you trust or have selected to make healthcare decisions for you      Met with: pt's daughter via TC  Interview conducted  (bedside/phone):    Current PCP:   Chuck Santana MD      Financial  Commercial  Precert required for SNF : Y, N          3 night stay required - Y, N    ADLS  Support Systems/Care Needs: Children  Transportation: self    Meal Preparation: self    Housing  Living Arrangements: home alone  Steps: 2  Intent for return to present living arrangements: tbd  Identified Issues: no    Home Care Information  Active with [address] : No Agency:(Services)  Type of Home Care Services: None  Passport/Waiver : No  :                      Phone Number:    Passport/Waiver Services:   no          Durable Medical Equiptment   DME Provider: na  Equipment:   Walker___Cane___RTS___ BSC___Shower Chair___Hospital Bed___W/C____Other________  02 at ____Liter(s)---wears(frequency)_______ [facility] ___ CPAP___ BiPap___   N/A____      Home O2 Use :  No    If No for home O2---if presently on O2 during hospitalization:  Yes  if yes CM to follow for potential DC O2 need  Informed of need for care provider to bring portable home O2 tank on day of discharge for nursing to connect prior to leaving:   Not Indicated  Verbalized agreement/Understanding:   Not Indicated    Community Service Affiliation  Dialysis:  No    · Agency:  · Location:  · Dialysis Schedule:  · Phone:   · Fax: Other Community Services: (ex:PT/OT,Mental Health,Wound Clinic, Cardio/Pul [address])    DISCHARGE PLAN: Explained Case Management role/services. Reviewed chart. Pt in ICU on Vapotherm,C-19+, pt refusing intubation under any circumstances. Writer spoke with pt's daughter,Fritz, for initial interview. Pt from home alone. Pt IPTA per family. Following.

## 2021-12-02 NOTE — PROGRESS NOTES
3680:  Notified MD concern for hypoxia with minimum exertion with activity.  Patient shift body from side lying to prone, immediately desated to 74      0631:

## 2021-12-02 NOTE — ACP (ADVANCE CARE PLANNING)
Advance Care Planning     Advance Care Planning Inpatient Note  Spiritual Care Department    Today's Date: 12/2/2021  Unit: [facility] ICU    Received request from IDT Member. Upon review of chart and communication with care team, patient's decision making abilities are not in question. . Patient was/were present in the room during visit. Goals of ACP Conversation:  Discuss advance care planning documents    Health Care Decision Makers:       Primary Decision Maker: Lilian Howard - Child - [phone]    Secondary Decision Maker: aYsmin Cramer - Child - [phone]    Summary:  Verified Healthcare Decision Maker    Advance Care Planning Documents (Patient Wishes):  Living Will/Advance Directive     Assessment:   contacted pt via phone (due to isolation) to discuss Healthcare Power of [place] and Living Will documents. Patient states that she is comfortable with her daughters -- Nestor Corea and [address] [place] [address] Avenue - serving as her decision makers, and she does not plan to name an additional alternate agent, so she does not wish to complete a [address] document at this time. She does, however, wish to complete a Living Will, but states that an outside  is going to assist with this. (When writer communicated this to pt's daughters, they were unaware of who that might be, but are looking into it2). Both the patient and her daughters affirm that they have had open, honest conversations with regard to the patient's wishes for healthcare -- including pt's desire to decline intubation should the need arise. Spiritual Care will follow up with patient and family on 12/3/21 to assist with completion of Living Will, if needed.          Interventions:  Provided education on documents for clarity and greater understanding  Discussed and provided education on state decision maker hierarchy  Reviewed but did not complete ACP document    Care Preferences Communicated:   Ventilation:   If the patient, in their present state of health, suddenly became very ill and unable to breathe on their own,     the patient would NOT desire the use of a ventilator (breathing machine). If their health worsens and it becomes clear that the change of recovery is unlikely,     the patient would NOT desire the use of a ventilator (breathing machine). Outcomes/Plan:  Spiritual Care will follow up for possible Living Will completion.     Electronically signed by Rochelle Mckoy on 12/2/2021 at 5:49 PM

## 2021-12-02 NOTE — TELEPHONE ENCOUNTER
----- Message from Yanick Briscoe MD sent at 12/2/2021  1:21 PM EST -----  Contact: Maria L Elizabeth [phone]  Call her  ----- Message -----  From: Kristian Andrade  Sent: 12/2/2021  12:40 PM EST  To: Yanick Briscoe MD    Patient would like to speak to you about her mother's condition and care. Patient is currently admitted here in ICU with Covid 19. She would like you to call her asap please.     Thank you

## 2021-12-02 NOTE — PROGRESS NOTES
Physician Progress Note      Mark ROSENBERG #:                  [id number]  :                       1950  ADMIT DATE:       2021 4:32 AM  DISCH DATE:  RESPONDING  PROVIDER #:        Yobany Neff MD          QUERY TEXT:    Pt admitted with COVID-19 and noted to have on admission WBC 3.8, RR 22 and HR   94. If possible, please document in progress notes and discharge summary if   you are evaluating and/or treating: The medical record reflects the following:  Risk Factors: Covid, Covid pneumonia, acute respiratory failure, Leukopenia  Clinical Indicators:  WBC 3.8, RR 22 and HR 94  Treatment: Pulmonology consulted, O2 support was up to 15L, decadron,   robitussin, NS 1000 bolus in ED, monitor labs/images, monitor vitals    Thank You Ceci Sequeira RN, [email or web address]  Options provided:  -- Sepsis present on admission due to COVID-19 infection  -- Sepsis not present on admission due to COVID-19 infection  -- Sepsis present on admission due to COVID-19 pneumonia  -- Sepsis not present on admission due to COVID-19 pneumonia  -- Covid-19 infection without sepsis  -- Covid-19 pneumonia without sepsis  -- Other - I will add my own diagnosis  -- Disagree - Not applicable / Not valid  -- Disagree - Clinically unable to determine / Unknown  -- Refer to Clinical Documentation Reviewer    PROVIDER RESPONSE TEXT:    This patient has Covid-19 infection without sepsis.     Query created by: Ermias Ramos on 2021 10:53 AM      Electronically signed by:  Yobany Neff MD 2021 11:50 AM

## 2021-12-02 NOTE — PROGRESS NOTES
The patient has a picture of a card from [facility] ([address] [place] [address]) that sated the patient was positive for COVID. Confirmation Number [id number]. No date for the test is on the picture of the card. Will attempt to call and get the information faxed to the ICU.  Electronically signed by Jag Ford RN on 12/2/2021 at 11:21 AM

## 2021-12-02 NOTE — PROGRESS NOTES
Spoke with daughter, updated on vapotherm order and increased oxygen demand. Daughter stated, Yoseph Calabrese is not okay to make decisions. She feels she is not mentally there 100%, forgetful. Last Wednesday, patient started Ivermectin 11/24, missed 2 day, started to feel worse\".

## 2021-12-02 NOTE — PROGRESS NOTES
Had a Long conversation with the patiens daughters, Jarret Watson and Arthur Hills. Explained current treatment plan for the patient. Spoke with Dr. Yimi Santiago and he will change the patient from baricetinab to actemra for the patient and family request. Family is still requesting zinc and ascorbic acid. The family verbalized understanding on why the patient is not eligible for monoclonal antibodies (hospitalized and on oxygen).  Electronically signed by Jag Ford RN on 12/2/2021 at 6:15 PM

## 2021-12-02 NOTE — PLAN OF CARE
Problem: Pain:  Goal: Pain level will decrease  Description: Pain level will decrease  12/1/2021 1613 by Katharine Jc RN  Outcome: Ongoing  Goal: Control of acute pain  Description: Control of acute pain  12/1/2021 1613 by Katharine Jc RN  Outcome: Ongoing  Goal: Control of chronic pain  Description: Control of chronic pain  12/1/2021 1613 by Katharine Jc RN  Outcome: Ongoing  Goal: Patient's pain/discomfort is manageable  Description: Patient's pain/discomfort is manageable  12/1/2021 1613 by Katharine Jc RN  Outcome: Ongoing     Problem: Infection:  Goal: Will remain free from infection  Description: Will remain free from infection  12/2/2021 0417 by Kang Gonsales RN  Outcome: Ongoing  12/1/2021 1613 by Katharine Jc RN  Outcome: Ongoing     Problem: Safety:  Goal: Free from accidental physical injury  Description: Free from accidental physical injury  12/2/2021 0417 by Kang Gonsales RN  Outcome: Ongoing  12/1/2021 1613 by Katharine Jc RN  Outcome: Ongoing  Goal: Free from intentional harm  Description: Free from intentional harm  12/2/2021 0417 by Kang Gonsales RN  Outcome: Ongoing  12/1/2021 1613 by Katharine Jc RN  Outcome: Ongoing     Problem: Daily Care:  Goal: Daily care needs are met  Description: Daily care needs are met  12/2/2021 0417 by Kang Gonsales RN  Outcome: Ongoing  12/1/2021 1613 by Katharine Jc RN  Outcome: Ongoing     Problem: Skin Integrity:  Goal: Skin integrity will stabilize  Description: Skin integrity will stabilize  12/2/2021 0417 by Kang Gonsales RN  Outcome: Ongoing  12/1/2021 1613 by Katharine Jc RN  Outcome: Ongoing     Problem: Discharge Planning:  Goal: Patients continuum of care needs are met  Description: Patients continuum of care needs are met  12/2/2021 0417 by Kang Gonsales RN  Outcome: Ongoing  12/1/2021 1613 by Katharine Jc RN  Outcome: Ongoing     Problem: Airway Clearance - Ineffective  Goal: Achieve or maintain patent airway  Outcome: Ongoing     Problem: Gas Exchange - Impaired  Goal: Absence of hypoxia  Outcome: Ongoing  Goal: Promote optimal lung function  Outcome: Ongoing     Problem: Breathing Pattern - Ineffective  Goal: Ability to achieve and maintain a regular respiratory rate  Outcome: Ongoing     Problem:  Body Temperature -  Risk of, Imbalanced  Goal: Ability to maintain a body temperature within defined limits  Outcome: Ongoing  Goal: Will regain or maintain usual level of consciousness  Outcome: Ongoing  Goal: Complications related to the disease process, condition or treatment will be avoided or minimized  Outcome: Ongoing     Problem: Isolation Precautions - Risk of Spread of Infection  Goal: Prevent transmission of infection  Outcome: Ongoing     Problem: Nutrition Deficits  Goal: Optimize nutritional status  Outcome: Ongoing     Problem: Risk for Fluid Volume Deficit  Goal: Maintain normal heart rhythm  Outcome: Ongoing  Goal: Maintain absence of muscle cramping  Outcome: Ongoing  Goal: Maintain normal serum potassium, sodium, calcium, phosphorus, and pH  Outcome: Ongoing     Problem: Loneliness or Risk for Loneliness  Goal: Demonstrate positive use of time alone when socialization is not possible  Outcome: Ongoing     Problem: Fatigue  Goal: Verbalize increase energy and improved vitality  Outcome: Ongoing     Problem: Patient Education: Go to Patient Education Activity  Goal: Patient/Family Education  Outcome: Ongoing

## 2021-12-02 NOTE — PROGRESS NOTES
D: Updated the patients daughter Sheldon Awad at the patients request. Mrs Shanel Roldan is currently on BiPAP. Lee catheter inserted by HERB Pereyra.  Electronically signed by Marcelle Oscar RN on 12/2/2021 at 7:56 AM

## 2021-12-02 NOTE — PROGRESS NOTES
I was contacted by bedside RN re: baricitinib. Despite my earlier conversation with family and patient explaining current best practice regarding not co-administering baricitinib and tocilizumab, they are requesting tocilizumab be given. This is not my recommendation due to already starting baricitinib, however, tocilizumab is a reasonable intervention in this patient population otherwise. Therefore, I will stop baricitinib now and plan to give tocilizumab tomorrow morning. I did discuss the risk of immunosuppressing medications/treatments earlier today with the patient and her family. Family has also requested sedatives for patient, which are not at this time indicated and could increase mortality. Therefore, these are not provided absent a clear indication. Family has also requested \"monoclonal antibody\" which I earlier clarified to mean RegenCov. This is also not indicated and could increase mortality based upon available evidence. It is not given.

## 2021-12-02 NOTE — CONSULTS
Patient is being seen at the request of Dr. Licha Ponce  for a consultation for COVID-19 and respiratory failure in an unvaccinated patient      HISTORY OF PRESENT ILLNESS: This is a 71-year-old female with history of depression who was admitted 12/1/2021 after presenting to the emergency department with a history of testing positive for Covid 10 days prior, she started taking ivermectin at home, subsequently noted to have a saturation of 70% and so she presented to the emergency department. PAST MEDICAL HISTORY:  Past Medical History:   Diagnosis Date    Depression     Ischial bursitis     Osteopenia     Thyroid disease     Vitamin D deficiency      PAST SURGICAL HISTORY:  Past Surgical History:   Procedure Laterality Date    ELBOW SURGERY      HYSTERECTOMY      SHOULDER SURGERY      Right       FAMILY HISTORY:  family history includes Allergy (Severe) in her sister, sister, and sister; Cancer in her mother and sister; Diabetes in her maternal cousin; Heart Disease in her father; High Blood Pressure in her daughter, sister, sister, and sister; Other in her father. SOCIAL HISTORY:   reports that she has never smoked.  She has never used smokeless tobacco.    Scheduled Meds:   sodium chloride flush  5-40 mL IntraVENous 2 times per day    dexamethasone  6 mg IntraVENous Q24H    Vitamin D  2,000 Units Oral Daily    enoxaparin  30 mg SubCUTAneous BID    levothyroxine  50 mcg Oral Daily    liothyronine  5 mcg Oral Daily    therapeutic multivitamin-minerals  1 tablet Oral Daily    influenza virus vaccine  0.5 mL IntraMUSCular Prior to discharge    baricitinib  4 mg Oral Daily     Continuous Infusions:   sodium chloride       PRN Meds:  benzonatate, sodium chloride flush, sodium chloride, ondansetron **OR** ondansetron, polyethylene glycol, acetaminophen **OR** acetaminophen, guaiFENesin-dextromethorphan, albuterol sulfate HFA    ALLERGIES:  Patient is allergic to forteo [parathyroid hormone (recomb)], atarax [hydroxyzine], codeine, and vicodin [hydrocodone-acetaminophen]. REVIEW OF SYSTEMS:    Respiratory: + for dyspnea, cough      PHYSICAL EXAM:  Blood pressure 127/74, pulse 89, temperature 98.5 °F (36.9 °C), temperature source Axillary, resp. rate 20, height 5' 4\" (1.626 m), weight 140 lb (63.5 kg), SpO2 96 %, not currently breastfeeding.' on BiPAP  General:  ill appearing    Resp: + crackles. No wheezing. CV: S1, S2. No edema  GI: NT, ND, +BS  Skin: Warm and dry. Neuro: PERRL. Alert and oriented X 3     LABS:  CBC:   Recent Labs     12/01/21  0430 12/02/21  0513   WBC 3.8* 4.7   HGB 13.4 13.5   HCT 39.8 39.7   MCV 93.4 94.3    194     BMP:   Recent Labs     12/01/21  0430 12/02/21  0513    145   K 3.4* 4.0    105   CO2 31 30   BUN 6* 7   CREATININE 0.6 <0.5*     LIVER PROFILE:   Recent Labs     12/01/21  0430 12/02/21  0513   * 88*   ALT 83* 62*   BILITOT <0.2 <0.2   ALKPHOS 270* 215*     PT/INR: No results for input(s): PROTIME, INR in the last 72 hours. APTT: No results for input(s): APTT in the last 72 hours. UA:No results for input(s): NITRITE, COLORU, PHUR, LABCAST, WBCUA, RBCUA, MUCUS, TRICHOMONAS, YEAST, BACTERIA, CLARITYU, SPECGRAV, LEUKOCYTESUR, UROBILINOGEN, BILIRUBINUR, BLOODU, GLUCOSEU, AMORPHOUS in the last 72 hours. Invalid input(s): KETONESU  No results for input(s): PHART, JKL2BVP, PO2ART in the last 72 hours. Micro:  Nov 2021 (approximately 11/23/21 @ testing site on [place]) - SARS-CoV-2 positive  12/1/2021 blood NGTD    Imaging:  CTPA 12/1/21   Impression   No evidence of pulmonary embolism.       Extensive bilateral multifocal airspace disease compatible with multifocal   pneumonia, specifically COVID pneumonia.        ASSESSMENT:  Acute hypoxemic respiratory failure, severe, progressive and life threatening  COVID-19 pneumonia in an unvaccinated patient    Depression    PLAN:  COVID-19 isolation, droplet plus  Bilevel non-invasive positive pressure ventilation for life threatening respiratory failure, breaks as tolerated with vapotherm. Has refused intubation under any circumstance; I personally confirmed this with the patient while she was on the phone with her daughter today. Decadron D#2, now 12 mg daily   Baricitinib day #2  Inhaled bronchodilators only as needed, MDI preferred   Lovenox 30 mg SQ BID   DNI  I spoke with family on the phone today and answered multiple questions. Total critical care time caring for this patient with life threatening, unstable organ failure, including direct patient contact, management of life support systems, review of data including imaging and labs, discussions with other team members and physicians is 33 minutes so far today, excluding procedures.

## 2021-12-02 NOTE — PROGRESS NOTES
While giving the patient her vitamin D. She expressed that she spoke with her sister Irene Ayers is a nurse in [place]. Ms Sandoval verbalized that Stephon Nicole stated that if she needed to be intubated that she Miguel A Hawk would recommend that Ms Sandoval be intubated and change her code status to FULL code. Ms Sandoval verbalized that after talking to Stephon Nicole and Dr. Phoebe Cheung she is not sure that she wants remain a DNI and Ms Sandoval asked if she would be intubated now if she was a full code and if she would have any chance of coming off the ventilator. Verbalized that she would not be intubated unless it was medically necessary and that the majority of patients that are intubated are successfully extubated. Ms Sandoval asked for a few minutes to discuss her code status with her daughter Rhona Marques. Ms Sandoval discussed her sister's Sandra's veiw point with Rhona Marques after several minutes she called out and verbalized that she wishes her code status to remain a Do Not Intubate.  Electronically signed by Bethanie Cook RN on 12/2/2021 at 11:48 AM

## 2021-12-02 NOTE — PROGRESS NOTES
Internal Medicine ICU Progress Note      Events of Last 24 hours:     70-year-old female admitted to hospital with COVID-19. She has a past medical history of depression. She tested positive for Covid about 10 days ago. She apparently was taking ivermectin at home. She came to the emergency room and had a saturation of 70%. She was admitted to hospital and then transferred the ICU. She is presently on Vapotherm. Pulmonologist discussed with patient and family and apparently she wanted to be a DNI. RN came and told me later on that she was considering intubation. She is unvaccinated. Invasive Lines: PIV      MV: N/AA    No results for input(s): PHART, FLB9YBJ, PO2ART in the last 72 hours. MV Settings:     / / /FiO2 : 100 %    IV:   sodium chloride         Vitals:  Temp  Av.7 °F (37.1 °C)  Min: 97.8 °F (36.6 °C)  Max: 99.6 °F (37.6 °C)  Pulse  Av.1  Min: 80  Max: 106  BP  Min: 117/67  Max: 152/82  SpO2  Av.2 %  Min: 75 %  Max: 99 %  FiO2   Av.5 %  Min: 65 %  Max: 100 %  Patient Vitals for the past 4 hrs:   BP Temp Temp src Pulse Resp SpO2   21 1000 133/67 -- -- 103 20 96 %   21 0900 117/67 -- -- 80 25 96 %   21 0800 121/62 99.6 °F (37.6 °C) Axillary 84 28 99 %       CVP:        Intake/Output Summary (Last 24 hours) at 2021 1147  Last data filed at 2021 2245  Gross per 24 hour   Intake 5 ml   Output --   Net 5 ml       EXAM:  General: Awake and alert. Ill-appearing. In moderate distress. Eyes: PERRL. No sclera icterus. No conjunctiva injected. ENT: No discharge. Pharynx clear. Neck: Trachea midline. Normal thyroid. Resp: + accessory muscle use. + crackles. No wheezing. No rhonchi. No dullness on percussion. CV: Regular rate. Regular rhythm. No mumur or rub. No edema. No JVD. Palpable pedal pulses. GI: Non-tender. Non-distended. No masses. No organmegaly. Normal bowel sounds. No hernia. Skin: Warm and dry. No nodule on exposed extremities.  No rash on exposed extremities. Lymph: No cervical LAD. No supraclavicular LAD. M/S: No cyanosis. No joint deformity. No clubbing. Neuro: Awake. Follows commands. Positive pupils/gag/corneals. Normal pain response. Psych: Oriented to person, place, time. No anxiety or agitation. Medications:  Scheduled Meds:   [START ON 12/3/2021] dexamethasone  12 mg IntraVENous Q24H    vitamin D  50,000 Units Oral Weekly    famotidine  20 mg Oral BID    sodium chloride flush  5-40 mL IntraVENous 2 times per day    enoxaparin  30 mg SubCUTAneous BID    levothyroxine  50 mcg Oral Daily    liothyronine  5 mcg Oral Daily    therapeutic multivitamin-minerals  1 tablet Oral Daily    influenza virus vaccine  0.5 mL IntraMUSCular Prior to discharge    baricitinib  4 mg Oral Daily       PRN Meds:  benzonatate, sodium chloride flush, sodium chloride, ondansetron **OR** ondansetron, polyethylene glycol, acetaminophen **OR** acetaminophen, guaiFENesin-dextromethorphan, albuterol sulfate HFA    Results:  CBC:   Recent Labs     12/01/21 0430 12/02/21  0513   WBC 3.8* 4.7   HGB 13.4 13.5   HCT 39.8 39.7   MCV 93.4 94.3    194     BMP:   Recent Labs     12/01/21 0430 12/02/21  0513    145   K 3.4* 4.0    105   CO2 31 30   BUN 6* 7   CREATININE 0.6 <0.5*     LIVER PROFILE:   Recent Labs     12/01/21 0430 12/02/21  0513   * 88*   ALT 83* 62*   BILITOT <0.2 <0.2   ALKPHOS 270* 215*   Results for Fannie Bears (MRN [id number]) as of 12/2/2021 11:46   Ref. Range 12/1/2021 04:30   Procalcitonin Latest Ref Range: 0.00 - 0.15 ng/mL 0.12       Cultures:  COVID positive outside lab    Films:    CT CHEST PULMONARY EMBOLISM W CONTRAST   Final Result   No evidence of pulmonary embolism. Extensive bilateral multifocal airspace disease compatible with multifocal   pneumonia, specifically COVID pneumonia.          XR CHEST PORTABLE   Final Result   Multifocal peripherally located pulmonary opacities are seen,

## 2021-12-02 NOTE — CONSULTS
Pharmacy Consult for Tocilizumab Initiation per Dr. Chrissy Pérez per [facility] P[facility]T Committee  **All criteria need to be met to receive   Tocilizumab for COVID-19 patients**   Age    >22 years old   Yes   Ordering Provider    Restricted to ID, intensivists, or pulmonology  Hospitalist may order in ID absence      Yes   Laboratory Results    Confirmed positive COVID-19  CRP >75 mg/L     C-REACTIVE PROTEIN:  No results found for: CRPHS  94.2     Clinical Status       Within 7 days of symptom onset (< 7 days) OR   Within 2 days of hospital admission OR  Within 24 hours of mechanical ventilation       yes   Concomitant Therapy    Receiving systemic steroids for treatment of COVID 19     yes   Oxygen Status     <92% OR requiring supplemental oxygen    Yes     Contraindications    1. Invasive active mycobacterial or fungal infection  2. Platelet <618,223 or active bleeding  4. Significant immunosuppression    ?     None     Dosing Recommendations:  (One dose maximum)    Dose when compounding from SQ vial:  Patient weight = 63.5 kg.  486 mg (3 syringes): Patient weight >40 kg to ? 65 kg x 1 dose       Thank you for the consult,  ANTOINE DowdPh.12/2/20214:26 PM Include Z78.9 (Other Specified Conditions Influencing Health Status) As An Associated Diagnosis?: No Render Post-Care Instructions In Note?: yes Medical Necessity Information: It is in your best interest to select a reason for this procedure from the list below. All of these items fulfill various CMS LCD requirements except the new and changing color options. Number Of Freeze-Thaw Cycles: 1 freeze-thaw cycle Consent: The patient's consent was obtained including but not limited to risks of crusting, scabbing, blistering, scarring, darker or lighter pigmentary change, recurrence, incomplete removal and infection. Medical Necessity Clause: This procedure was medically necessary because the lesions that were treated were: Post-Care Instructions: I reviewed with the patient in detail post-care instructions. Patient is to wear sunprotection, and avoid picking at any of the treated lesions. Pt may apply Vaseline to crusted or scabbing areas. Detail Level: Detailed Pared With?: 15 blade

## 2021-12-03 ENCOUNTER — APPOINTMENT (OUTPATIENT)
Dept: INTERVENTIONAL RADIOLOGY/VASCULAR | Age: 71
DRG: 177 | End: 2021-12-03
Payer: MEDICARE

## 2021-12-03 LAB
A/G RATIO: 1 (ref 1.1–2.2)
ALBUMIN SERPL-MCNC: 3 G/DL (ref 3.4–5)
ALP BLD-CCNC: 190 U/L (ref 40–129)
ALT SERPL-CCNC: 56 U/L (ref 10–40)
ANION GAP SERPL CALCULATED.3IONS-SCNC: 11 MMOL/L (ref 3–16)
AST SERPL-CCNC: 79 U/L (ref 15–37)
BASOPHILS ABSOLUTE: 0 K/UL (ref 0–0.2)
BASOPHILS RELATIVE PERCENT: 0.1 %
BILIRUB SERPL-MCNC: 0.3 MG/DL (ref 0–1)
BUN BLDV-MCNC: 15 MG/DL (ref 7–20)
CALCIUM SERPL-MCNC: 8.6 MG/DL (ref 8.3–10.6)
CHLORIDE BLD-SCNC: 102 MMOL/L (ref 99–110)
CO2: 28 MMOL/L (ref 21–32)
CREAT SERPL-MCNC: <0.5 MG/DL (ref 0.6–1.2)
EOSINOPHILS ABSOLUTE: 0 K/UL (ref 0–0.6)
EOSINOPHILS RELATIVE PERCENT: 0 %
GFR AFRICAN AMERICAN: >60
GFR NON-AFRICAN AMERICAN: >60
GLUCOSE BLD-MCNC: 113 MG/DL (ref 70–99)
GLUCOSE BLD-MCNC: 126 MG/DL (ref 70–99)
GLUCOSE BLD-MCNC: 127 MG/DL (ref 70–99)
GLUCOSE BLD-MCNC: 135 MG/DL (ref 70–99)
GLUCOSE BLD-MCNC: 146 MG/DL (ref 70–99)
GLUCOSE BLD-MCNC: 148 MG/DL (ref 70–99)
HCT VFR BLD CALC: 38.6 % (ref 36–48)
HEMOGLOBIN: 13.1 G/DL (ref 12–16)
INR BLD: 0.91 (ref 0.88–1.12)
LYMPHOCYTES ABSOLUTE: 0.7 K/UL (ref 1–5.1)
LYMPHOCYTES RELATIVE PERCENT: 10.6 %
MCH RBC QN AUTO: 32 PG (ref 26–34)
MCHC RBC AUTO-ENTMCNC: 33.8 G/DL (ref 31–36)
MCV RBC AUTO: 94.7 FL (ref 80–100)
MONOCYTES ABSOLUTE: 0.6 K/UL (ref 0–1.3)
MONOCYTES RELATIVE PERCENT: 9.3 %
NEUTROPHILS ABSOLUTE: 5.4 K/UL (ref 1.7–7.7)
NEUTROPHILS RELATIVE PERCENT: 80 %
PDW BLD-RTO: 13.6 % (ref 12.4–15.4)
PERFORMED ON: ABNORMAL
PLATELET # BLD: 234 K/UL (ref 135–450)
PMV BLD AUTO: 8.1 FL (ref 5–10.5)
POTASSIUM REFLEX MAGNESIUM: 3.6 MMOL/L (ref 3.5–5.1)
PROCALCITONIN: 0.08 NG/ML (ref 0–0.15)
PROTHROMBIN TIME: 10.2 SEC (ref 9.9–12.7)
RBC # BLD: 4.08 M/UL (ref 4–5.2)
SODIUM BLD-SCNC: 141 MMOL/L (ref 136–145)
TOTAL PROTEIN: 6 G/DL (ref 6.4–8.2)
VITAMIN D 25-HYDROXY: 71.9 NG/ML
WBC # BLD: 6.8 K/UL (ref 4–11)

## 2021-12-03 PROCEDURE — 6360000002 HC RX W HCPCS: Performed by: INTERNAL MEDICINE

## 2021-12-03 PROCEDURE — 36592 COLLECT BLOOD FROM PICC: CPT

## 2021-12-03 PROCEDURE — 85025 COMPLETE CBC W/AUTO DIFF WBC: CPT

## 2021-12-03 PROCEDURE — 85610 PROTHROMBIN TIME: CPT

## 2021-12-03 PROCEDURE — 05H933Z INSERTION OF INFUSION DEVICE INTO RIGHT BRACHIAL VEIN, PERCUTANEOUS APPROACH: ICD-10-PCS | Performed by: INTERNAL MEDICINE

## 2021-12-03 PROCEDURE — 36573 INSJ PICC RS&I 5 YR+: CPT

## 2021-12-03 PROCEDURE — 94761 N-INVAS EAR/PLS OXIMETRY MLT: CPT

## 2021-12-03 PROCEDURE — XW033H5 INTRODUCTION OF TOCILIZUMAB INTO PERIPHERAL VEIN, PERCUTANEOUS APPROACH, NEW TECHNOLOGY GROUP 5: ICD-10-PCS | Performed by: INTERNAL MEDICINE

## 2021-12-03 PROCEDURE — 99291 CRITICAL CARE FIRST HOUR: CPT | Performed by: INTERNAL MEDICINE

## 2021-12-03 PROCEDURE — 82306 VITAMIN D 25 HYDROXY: CPT

## 2021-12-03 PROCEDURE — C1751 CATH, INF, PER/CENT/MIDLINE: HCPCS

## 2021-12-03 PROCEDURE — 2580000003 HC RX 258: Performed by: INTERNAL MEDICINE

## 2021-12-03 PROCEDURE — 94660 CPAP INITIATION&MGMT: CPT

## 2021-12-03 PROCEDURE — 84145 PROCALCITONIN (PCT): CPT

## 2021-12-03 PROCEDURE — 6370000000 HC RX 637 (ALT 250 FOR IP): Performed by: NURSE PRACTITIONER

## 2021-12-03 PROCEDURE — 2000000000 HC ICU R&B

## 2021-12-03 PROCEDURE — 6370000000 HC RX 637 (ALT 250 FOR IP): Performed by: INTERNAL MEDICINE

## 2021-12-03 PROCEDURE — 80053 COMPREHEN METABOLIC PANEL: CPT

## 2021-12-03 PROCEDURE — 36415 COLL VENOUS BLD VENIPUNCTURE: CPT

## 2021-12-03 PROCEDURE — 2700000000 HC OXYGEN THERAPY PER DAY

## 2021-12-03 PROCEDURE — 2500000003 HC RX 250 WO HCPCS: Performed by: INTERNAL MEDICINE

## 2021-12-03 PROCEDURE — 99233 SBSQ HOSP IP/OBS HIGH 50: CPT | Performed by: INTERNAL MEDICINE

## 2021-12-03 RX ORDER — SODIUM CHLORIDE 0.9 % (FLUSH) 0.9 %
5-40 SYRINGE (ML) INJECTION PRN
Status: DISCONTINUED | OUTPATIENT
Start: 2021-12-03 | End: 2021-12-03 | Stop reason: SDUPTHER

## 2021-12-03 RX ORDER — SODIUM CHLORIDE 0.9 % (FLUSH) 0.9 %
5-40 SYRINGE (ML) INJECTION EVERY 12 HOURS SCHEDULED
Status: DISCONTINUED | OUTPATIENT
Start: 2021-12-03 | End: 2021-12-03 | Stop reason: SDUPTHER

## 2021-12-03 RX ORDER — POTASSIUM CHLORIDE 29.8 MG/ML
20 INJECTION INTRAVENOUS ONCE
Status: COMPLETED | OUTPATIENT
Start: 2021-12-03 | End: 2021-12-03

## 2021-12-03 RX ORDER — SODIUM CHLORIDE 9 MG/ML
25 INJECTION, SOLUTION INTRAVENOUS PRN
Status: DISCONTINUED | OUTPATIENT
Start: 2021-12-03 | End: 2021-12-03 | Stop reason: SDUPTHER

## 2021-12-03 RX ORDER — POTASSIUM CHLORIDE 7.45 MG/ML
10 INJECTION INTRAVENOUS
Status: DISCONTINUED | OUTPATIENT
Start: 2021-12-03 | End: 2021-12-03

## 2021-12-03 RX ORDER — DEXAMETHASONE SODIUM PHOSPHATE 10 MG/ML
6 INJECTION, SOLUTION INTRAMUSCULAR; INTRAVENOUS EVERY 24 HOURS
Status: DISCONTINUED | OUTPATIENT
Start: 2021-12-04 | End: 2021-12-10

## 2021-12-03 RX ORDER — LIDOCAINE HYDROCHLORIDE 10 MG/ML
5 INJECTION, SOLUTION EPIDURAL; INFILTRATION; INTRACAUDAL; PERINEURAL ONCE
Status: DISCONTINUED | OUTPATIENT
Start: 2021-12-03 | End: 2021-12-16

## 2021-12-03 RX ORDER — DEXMEDETOMIDINE HYDROCHLORIDE 4 UG/ML
.2-1.4 INJECTION, SOLUTION INTRAVENOUS CONTINUOUS
Status: DISCONTINUED | OUTPATIENT
Start: 2021-12-03 | End: 2021-12-08

## 2021-12-03 RX ORDER — MECOBALAMIN 5000 MCG
5 TABLET,DISINTEGRATING ORAL NIGHTLY PRN
Status: DISCONTINUED | OUTPATIENT
Start: 2021-12-03 | End: 2021-12-16

## 2021-12-03 RX ADMIN — ENOXAPARIN SODIUM 30 MG: 100 INJECTION SUBCUTANEOUS at 20:19

## 2021-12-03 RX ADMIN — Medication 0.4 MCG/KG/HR: at 23:13

## 2021-12-03 RX ADMIN — LEVOTHYROXINE SODIUM 50 MCG: 0.03 TABLET ORAL at 07:51

## 2021-12-03 RX ADMIN — MUPIROCIN: 20 OINTMENT TOPICAL at 07:48

## 2021-12-03 RX ADMIN — Medication 0.2 MCG/KG/HR: at 03:29

## 2021-12-03 RX ADMIN — TOCILIZUMAB 486 MG: 180 INJECTION, SOLUTION SUBCUTANEOUS at 06:39

## 2021-12-03 RX ADMIN — SODIUM CHLORIDE, PRESERVATIVE FREE 10 ML: 5 INJECTION INTRAVENOUS at 07:49

## 2021-12-03 RX ADMIN — GUAIFENESIN AND DEXTROMETHORPHAN 5 ML: 100; 10 SYRUP ORAL at 02:31

## 2021-12-03 RX ADMIN — Medication 5 MG: at 20:19

## 2021-12-03 RX ADMIN — FAMOTIDINE 20 MG: 20 TABLET ORAL at 07:51

## 2021-12-03 RX ADMIN — DEXAMETHASONE SODIUM PHOSPHATE 12 MG: 4 INJECTION, SOLUTION INTRAMUSCULAR; INTRAVENOUS at 14:40

## 2021-12-03 RX ADMIN — POTASSIUM CHLORIDE 20 MEQ: 400 INJECTION, SOLUTION INTRAVENOUS at 16:32

## 2021-12-03 RX ADMIN — LIOTHYRONINE SODIUM 5 MCG: 5 TABLET ORAL at 07:51

## 2021-12-03 RX ADMIN — MULTIPLE VITAMINS W/ MINERALS TAB 1 TABLET: TAB at 07:52

## 2021-12-03 RX ADMIN — SODIUM CHLORIDE 245 ML: 9 INJECTION, SOLUTION INTRAVENOUS at 06:37

## 2021-12-03 RX ADMIN — BENZONATATE 100 MG: 100 CAPSULE ORAL at 02:31

## 2021-12-03 RX ADMIN — ENOXAPARIN SODIUM 30 MG: 100 INJECTION SUBCUTANEOUS at 07:51

## 2021-12-03 RX ADMIN — MUPIROCIN: 20 OINTMENT TOPICAL at 20:20

## 2021-12-03 RX ADMIN — SODIUM CHLORIDE, PRESERVATIVE FREE 30 ML: 5 INJECTION INTRAVENOUS at 20:19

## 2021-12-03 RX ADMIN — ACETAMINOPHEN 650 MG: 325 TABLET ORAL at 02:31

## 2021-12-03 RX ADMIN — Medication 0.8 MCG/KG/HR: at 14:42

## 2021-12-03 RX ADMIN — FAMOTIDINE 20 MG: 20 TABLET ORAL at 20:19

## 2021-12-03 ASSESSMENT — PAIN SCALES - GENERAL
PAINLEVEL_OUTOF10: 0
PAINLEVEL_OUTOF10: 0
PAINLEVEL_OUTOF10: 3
PAINLEVEL_OUTOF10: 0

## 2021-12-03 NOTE — PROGRESS NOTES
EOS summary:    -At start of shift, patient on Vapotherm 80% and 40lpm. SpO2 stable.    -Early this AM she had increased SOB with SpO2 dropping into 70s, Vapotherm maxed at 100% and 40lpm with 15L NRB mask additionally.     -Orders for BiPAP and precedex gtt received. PAP 16/8 100%, patient with SpO2 in upper 90s since starting BiPAP.

## 2021-12-03 NOTE — PROGRESS NOTES
Pulmonary & Critical Care Medicine ICU Progress Note    CC: COVID-19    Events of Last 24 hours:   Precedex 0.6 mcg/kg/hr   Vapotherm 40 % FiO2 --> 80% by me   Self proning  BiPAP all night  100% FiO2         Vascular lines: IV: PIVs     MV:       / / /FiO2 : 100 %  No results for input(s): PHART, VIN7QCP, PO2ART in the last 72 hours. IV:   dexmedetomidine HCl in NaCl 0.4 mcg/kg/hr (21 0640)    sodium chloride 245 mL (21 1600)       Vitals:  Blood pressure 105/63, pulse 66, temperature 97.7 °F (36.5 °C), temperature source Axillary, resp. rate 25, height 5' 4\" (1.626 m), weight 140 lb (63.5 kg), SpO2 99 %, not currently breastfeeding. On Vapotherm    Temp  Av.4 °F (36.9 °C)  Min: 97.7 °F (36.5 °C)  Max: 99.6 °F (37.6 °C)    Intake/Output Summary (Last 24 hours) at 12/3/2021 0722  Last data filed at 12/3/2021 0640  Gross per 24 hour   Intake 114.99 ml   Output 2550 ml   Net -2435.01 ml     PE:  General: ill appearing    Eyes: PERRL. No sclera icterus. No conjunctival injection. ENT: No discharge. Pharynx clear. Neck: Trachea midline. Normal thyroid. Resp: + accessory muscle use. Few crackles. No wheezing. No rhonchi. No dullness on percussion. CV: Regular rate. Regular rhythm. No mumur or rub. No edema. GI: Non-tender. Non-distended. No masses. No organomegaly. Normal bowel sounds. No hernia. Skin: Warm and dry. No nodule on exposed extremities. No rash on exposed extremities. Lymph: No cervical LAD. No supraclavicular LAD. M/S: No cyanosis. No joint deformity. No clubbing. Neuro: AAOx3. Followed commands.  Patellar reflexes are symmetric  Psych: No agitation, no anxiety, affect is full       Scheduled Meds:   mupirocin   Nasal BID    dexamethasone  12 mg IntraVENous Q24H    vitamin D  50,000 Units Oral Weekly    famotidine  20 mg Oral BID    tocilizumab (ACTEMRA-SUBQ) IVPB  486 mg IntraVENous Once    sodium chloride flush  5-40 mL IntraVENous 2 times per day    enoxaparin  30 mg SubCUTAneous BID    levothyroxine  50 mcg Oral Daily    liothyronine  5 mcg Oral Daily    therapeutic multivitamin-minerals  1 tablet Oral Daily    influenza virus vaccine  0.5 mL IntraMUSCular Prior to discharge       Data:  CBC:   Recent Labs     12/01/21  0430 12/02/21  0513 12/03/21  0625   WBC 3.8* 4.7 6.8   HGB 13.4 13.5 13.1   HCT 39.8 39.7 38.6   MCV 93.4 94.3 94.7    194 234     BMP:   Recent Labs     12/01/21  0430 12/02/21  0513 12/03/21  0625    145 141   K 3.4* 4.0 3.6    105 102   CO2 31 30 28   BUN 6* 7 15   CREATININE 0.6 <0.5* <0.5*     LIVER PROFILE:   Recent Labs     12/01/21  0430 12/02/21  0513 12/03/21  0625   * 88* 79*   ALT 83* 62* 56*   BILITOT <0.2 <0.2 0.3   ALKPHOS 270* 215* 190*       Microbiology:  12/1 [facility] NGTD    Imaging:  CTPA 12/1 imaging reviewed by me and showed  No evidence of pulmonary embolism. Extensive bilateral multifocal airspace disease compatible with multifocal   pneumonia, specifically COVID pneumonia          ASSESSMENT:  · Acute hypoxemic respiratory failure -severe progressive life-threatening  · COVID-19 viral pneumonia  · Transminitis   · Electrolytes disorder   · Not vaccinated for COVID-19        PLAN:  Vapotherm for life-threatening acute hypoxemic respiratory failure and titrate to maintain SaO2 >92%  Bilevel non-invasive positive pressure ventilation per my orders. The ventilator was adjusted by me at the bedside for unstable, life threatening respiratory failure. Wean oxygen as tolerated. Supervised self proning   Off Abx- low procalcitonin   Continuous pulse oximetry  Droplet plus isolation (surgical mask, eye protection, gown, glove)  Dexamethasone 6 mg IV D#3-monitor blood glucose closely  Tocilizumab 12/3/21.  Post 2 doses of Baricitinib   Electrolytes disorder   PICC line for limited IV access   DVT prophylaxis: Lovenox BID   MRSA prophylaxis: Bactroban  Discussed with daughter at the bedside and multiple good questions were answered. Limited code with no intubation. Total critical care time caring for this patient with life threatening, unstable organ failure, including direct patient contact, management of life support systems, review of data including imaging and labs, discussions with other team members and physicians is 32 minutes, excluding procedures.

## 2021-12-03 NOTE — PLAN OF CARE
Problem: Pain:  Description: Pain management should include both nonpharmacologic and pharmacologic interventions. Goal: Pain level will decrease  Description: Pain level will decrease  Outcome: Ongoing  Goal: Control of acute pain  Description: Control of acute pain  Outcome: Ongoing  Goal: Control of chronic pain  Description: Control of chronic pain  Outcome: Ongoing  Goal: Patient's pain/discomfort is manageable  Description: Patient's pain/discomfort is manageable  Outcome: Ongoing     Problem: Infection:  Goal: Will remain free from infection  Description: Will remain free from infection  Outcome: Ongoing     Problem: Safety:  Goal: Free from accidental physical injury  Description: Free from accidental physical injury  Outcome: Ongoing  Goal: Free from intentional harm  Description: Free from intentional harm  Outcome: Ongoing     Problem: Daily Care:  Goal: Daily care needs are met  Description: Daily care needs are met  Outcome: Ongoing     Problem: Skin Integrity:  Goal: Skin integrity will stabilize  Description: Skin integrity will stabilize  Outcome: Ongoing     Problem: Discharge Planning:  Goal: Patients continuum of care needs are met  Description: Patients continuum of care needs are met  Outcome: Ongoing     Problem: Airway Clearance - Ineffective  Goal: Achieve or maintain patent airway  Outcome: Ongoing     Problem: Gas Exchange - Impaired  Goal: Absence of hypoxia  Outcome: Ongoing  Goal: Promote optimal lung function  Outcome: Ongoing     Problem: Breathing Pattern - Ineffective  Goal: Ability to achieve and maintain a regular respiratory rate  Outcome: Ongoing     Problem:  Body Temperature -  Risk of, Imbalanced  Goal: Ability to maintain a body temperature within defined limits  Outcome: Ongoing  Goal: Will regain or maintain usual level of consciousness  Outcome: Ongoing  Goal: Complications related to the disease process, condition or treatment will be avoided or minimized  Outcome: Ongoing     Problem: Isolation Precautions - Risk of Spread of Infection  Goal: Prevent transmission of infection  Outcome: Ongoing     Problem: Nutrition Deficits  Goal: Optimize nutritional status  Outcome: Ongoing     Problem: Risk for Fluid Volume Deficit  Goal: Maintain normal heart rhythm  Outcome: Ongoing  Goal: Maintain absence of muscle cramping  Outcome: Ongoing  Goal: Maintain normal serum potassium, sodium, calcium, phosphorus, and pH  Outcome: Ongoing     Problem: Loneliness or Risk for Loneliness  Goal: Demonstrate positive use of time alone when socialization is not possible  Outcome: Ongoing     Problem: Fatigue  Goal: Verbalize increase energy and improved vitality  Outcome: Ongoing     Problem: Patient Education: Go to Patient Education Activity  Goal: Patient/Family Education  Outcome: Ongoing     Problem: Falls - Risk of:  Goal: Will remain free from falls  Description: Will remain free from falls  Outcome: Ongoing  Goal: Absence of physical injury  Description: Absence of physical injury  Outcome: Ongoing

## 2021-12-03 NOTE — CARE COORDINATION
INTERDISCIPLINARY PLAN OF CARE CONFERENCE    Date/Time: 12/3/2021 11:22 AM  Completed by: Nicole Irizarry RN, Case Management      Patient Name:  Kaitlin Pearson  YOB: 1950  Admitting Diagnosis: Acute respiratory disease due to COVID-19 virus [U07.1, J06.9]  Acute respiratory failure due to COVID-19 ([address] [U07.1, J96.00]     Admit Date/Time:  12/1/2021  4:32 AM    Chart reviewed. Interdisciplinary team contacted or reviewed plan related to patient progress and discharge plans. Disciplines included Case Management, Nursing, and Dietitian. Current Status: IP 12/01/2021  PT/OT recommendation for discharge plan of care:     Expected D/C Disposition:  Home/IPTA  Confirmed plan: chart review  Discharge Plan Comments: ICU. Vapotherm/supervised self proning. IPTA at home. CM will follow and reassess as condition allows pending medical progress.      Home O2 in place on admit: No

## 2021-12-03 NOTE — PROGRESS NOTES
Shift assessment completed as documented on flowsheet. Pt awake alert and pleasant. Denies pain or discomfort at this time. Jesus patent to bsd.  Call light within reach

## 2021-12-03 NOTE — PROGRESS NOTES
Normal thyroid. Resp: + accessory muscle use. + crackles. No wheezing. No rhonchi. No dullness on percussion. CV: Regular rate. Regular rhythm. No mumur or rub. No edema. No JVD. Palpable pedal pulses. GI: Non-tender. Non-distended. No masses. No organmegaly. Normal bowel sounds. No hernia. Skin: Warm and dry. No nodule on exposed extremities. No rash on exposed extremities. Lymph: No cervical LAD. No supraclavicular LAD. M/S: No cyanosis. No joint deformity. No clubbing. Neuro: Awake. Follows commands. Positive pupils/gag/corneals. Normal pain response. Psych: Oriented to person, place, time. No anxiety or agitation.      Medications:  Scheduled Meds:   mupirocin   Nasal BID    lidocaine 1 % injection  5 mL IntraDERmal Once    [START ON 12/4/2021] dexamethasone  6 mg IntraVENous Q24H    potassium chloride  10 mEq IntraVENous Q1H    vitamin D  50,000 Units Oral Weekly    famotidine  20 mg Oral BID    sodium chloride flush  5-40 mL IntraVENous 2 times per day    enoxaparin  30 mg SubCUTAneous BID    levothyroxine  50 mcg Oral Daily    liothyronine  5 mcg Oral Daily    therapeutic multivitamin-minerals  1 tablet Oral Daily    influenza virus vaccine  0.5 mL IntraMUSCular Prior to discharge       PRN Meds:  melatonin, benzonatate, sodium chloride flush, sodium chloride, ondansetron **OR** ondansetron, polyethylene glycol, acetaminophen **OR** acetaminophen, guaiFENesin-dextromethorphan, albuterol sulfate HFA    Results:  CBC:   Recent Labs     12/01/21  0430 12/02/21  0513 12/03/21  0625   WBC 3.8* 4.7 6.8   HGB 13.4 13.5 13.1   HCT 39.8 39.7 38.6   MCV 93.4 94.3 94.7    194 234     BMP:   Recent Labs     12/01/21  0430 12/02/21  0513 12/03/21  0625    145 141   K 3.4* 4.0 3.6    105 102   CO2 31 30 28   BUN 6* 7 15   CREATININE 0.6 <0.5* <0.5*     LIVER PROFILE:   Recent Labs     12/01/21  0430 12/02/21  0513 12/03/21  0625   * 88* 79*   ALT 83* 62* 56*   BILITOT <0.2 <0.2 0.3   ALKPHOS 270* 215* 190*   Results for Gregg Beard (MRN [id number]) as of 12/2/2021 11:46   Ref. Range 12/1/2021 04:30   Procalcitonin Latest Ref Range: 0.00 - 0.15 ng/mL 0.12       Cultures:  COVID positive outside lab    Films:    CT CHEST PULMONARY EMBOLISM W CONTRAST   Final Result   No evidence of pulmonary embolism. Extensive bilateral multifocal airspace disease compatible with multifocal   pneumonia, specifically COVID pneumonia. XR CHEST PORTABLE   Final Result   Multifocal peripherally located pulmonary opacities are seen, likely related   to COVID-19 pneumonia. IR PICC WO SQ PORT/PUMP > 5 YEARS    (Results Pending)          Assessment:    Principal Problem:    Acute respiratory disease due to COVID-19 virus  Active Problems:    Depression    Hypokalemia    Elevated LFTs    Leukopenia    Pneumonia due to COVID-19 virus    Acquired hypothyroidism  Resolved Problems:    * No resolved hospital problems. *           Plan:    #Acute respiratory failure with hypoxemia due to COVID-19. Patient admitted to hospital.  Pulmonary consultation. Supplemental oxygen. Continuous pulse ox and telemetry. On Vapotherm. May require intubation but patient wanted to be DNI. Her CODE STATUS is still somewhat uncertain. Presently plans to continue Vapotherm. #COVID-19 pneumonia. Work-up consistent with COVID-19 pneumonia. She is hypoxic. She meets criteria for treatment. Started on Decadron 12 mg a day. Presently on 6 mg of Decadron. On Baricitinib day#3    #Acquired hypothyroidism. Continue home medication. #Depression. Stable    #Lovenox 30 mg subcu twice daily    #Transaminitis due to COVID-19      IMPORTANT: Please note that some portions of this note may have been created using Dragon voice recognition software.  Some \"sound-alike\" and totally wrong word substitutions may have taken place due to known inherent limitations of any such software, including this voice recognition software. In spite of efforts to eliminate such errors, some may not have been corrected. So please read the note with this in mind and recognize such mistakes and understand the correct version using the  context. If there are still uncertainties in the mind of the medical provider reading this note about any aspect of the note, the provider can feel free to contact me. Thanks. All questions and concerns were addressed to the patient/family. Alternatives to my treatment were discussed. The note was completed using EMR. Every effort was made to ensure accuracy; however, inadvertent computerized transcription errors may be present.          Domingo Medina MD 11:37 AM 12/3/2021

## 2021-12-03 NOTE — PROGRESS NOTES
12/03/21 0251   NIV Type   $NIV $Daily Charge   Skin Protection for O2 Device Yes   Location Nose   NIV Started/Stopped On   Equipment Type V60   Mode Bilevel   Mask Type Full face mask   Mask Size Medium   Settings/Measurements   IPAP 16 cmH20   CPAP/EPAP 8 cmH2O   Rate Ordered 16   Resp 28   Insp Rise Time (%) 2 %   FiO2  100 %   I Time/ I Time % 1 s   Vt Exhaled 503 mL   Minute Volume 13.8 Liters   Mask Leak (lpm) 1 lpm   Comfort Level Good   Using Accessory Muscles No   SpO2 93

## 2021-12-03 NOTE — PROGRESS NOTES
Assessment complete as per flow sheets. VSS. Patient is A/O x 4. Dyspnea with minimal exertion noted on Vapotherm, 40 lpm and 85%. Normal sinus rhythm on cardiac monitor. Bowel sounds + x4 quads. Patient denies pain. Comfort measures provided. Patient voids per yoo catheter, no issues noted. UO is clear and yellow in appearance. PIVs and all monitoring appears WNL. Dressing is C/D/I. Discussed POC, labs, testing, medical equipment and unit routine. Answered questions at this time. Meds as per MAR. Safety measures in place and will continue to monitor.

## 2021-12-03 NOTE — PROGRESS NOTES
Updated Dr. Fabi Aviles on the patient and family request for ascorbic acid and zinc. Orders to defer to Dr. Nba Monte on 12/3.  Electronically signed by Jamee Jordan RN on 12/2/2021 at 7:02 PM

## 2021-12-03 NOTE — PROGRESS NOTES
EOS report and transfer of care to [facility]. Patient resting in bed, stable condition at hand off.

## 2021-12-03 NOTE — PROGRESS NOTES
@ 0230- Patient desat to high 70-low 80s and sustaining on Vapotherm. She is resting in bed, appears calm. Repositioned her in bed and writer adjusted FiO2 to get SpO2 > 90%. After several minutes and titrations, patient is on 100% and 40 lpm via vapotherm, with additional 15 lpm NRB mask. Her SpO2 is barely 90%, not sustaining. No orders for BiPAP on chart. Writer updated RT and nocturnist, Dr. Cecilie Boxer. Orders received for BiPAP and precedex gtt. Writer discussed in depth with patient regarding current situation and interventions. She is agreeable to BiPAP. Denies any further questions/ needs. @02:53- RT placed patient on BiPAP. Now sustaining SpO2 >92%. See MAR for precedex gtt admin and titrations.

## 2021-12-04 LAB
A/G RATIO: 1 (ref 1.1–2.2)
ALBUMIN SERPL-MCNC: 2.9 G/DL (ref 3.4–5)
ALP BLD-CCNC: 175 U/L (ref 40–129)
ALT SERPL-CCNC: 50 U/L (ref 10–40)
ANION GAP SERPL CALCULATED.3IONS-SCNC: 12 MMOL/L (ref 3–16)
AST SERPL-CCNC: 61 U/L (ref 15–37)
BASOPHILS ABSOLUTE: 0 K/UL (ref 0–0.2)
BASOPHILS RELATIVE PERCENT: 0.1 %
BILIRUB SERPL-MCNC: 0.3 MG/DL (ref 0–1)
BUN BLDV-MCNC: 21 MG/DL (ref 7–20)
CALCIUM SERPL-MCNC: 8.3 MG/DL (ref 8.3–10.6)
CHLORIDE BLD-SCNC: 101 MMOL/L (ref 99–110)
CO2: 27 MMOL/L (ref 21–32)
CREAT SERPL-MCNC: 0.6 MG/DL (ref 0.6–1.2)
EOSINOPHILS ABSOLUTE: 0 K/UL (ref 0–0.6)
EOSINOPHILS RELATIVE PERCENT: 0 %
GFR AFRICAN AMERICAN: >60
GFR NON-AFRICAN AMERICAN: >60
GLUCOSE BLD-MCNC: 122 MG/DL (ref 70–99)
GLUCOSE BLD-MCNC: 131 MG/DL (ref 70–99)
GLUCOSE BLD-MCNC: 136 MG/DL (ref 70–99)
GLUCOSE BLD-MCNC: 146 MG/DL (ref 70–99)
HCT VFR BLD CALC: 40.5 % (ref 36–48)
HEMOGLOBIN: 13.7 G/DL (ref 12–16)
LYMPHOCYTES ABSOLUTE: 0.6 K/UL (ref 1–5.1)
LYMPHOCYTES RELATIVE PERCENT: 14.9 %
MCH RBC QN AUTO: 31.8 PG (ref 26–34)
MCHC RBC AUTO-ENTMCNC: 33.7 G/DL (ref 31–36)
MCV RBC AUTO: 94.3 FL (ref 80–100)
MONOCYTES ABSOLUTE: 0.4 K/UL (ref 0–1.3)
MONOCYTES RELATIVE PERCENT: 10.3 %
NEUTROPHILS ABSOLUTE: 3 K/UL (ref 1.7–7.7)
NEUTROPHILS RELATIVE PERCENT: 74.7 %
PDW BLD-RTO: 13.8 % (ref 12.4–15.4)
PERFORMED ON: ABNORMAL
PLATELET # BLD: 224 K/UL (ref 135–450)
PMV BLD AUTO: 8.5 FL (ref 5–10.5)
POTASSIUM REFLEX MAGNESIUM: 3.8 MMOL/L (ref 3.5–5.1)
RBC # BLD: 4.29 M/UL (ref 4–5.2)
SODIUM BLD-SCNC: 140 MMOL/L (ref 136–145)
TOTAL PROTEIN: 5.9 G/DL (ref 6.4–8.2)
WBC # BLD: 4 K/UL (ref 4–11)

## 2021-12-04 PROCEDURE — 36415 COLL VENOUS BLD VENIPUNCTURE: CPT

## 2021-12-04 PROCEDURE — 99233 SBSQ HOSP IP/OBS HIGH 50: CPT | Performed by: INTERNAL MEDICINE

## 2021-12-04 PROCEDURE — 94761 N-INVAS EAR/PLS OXIMETRY MLT: CPT

## 2021-12-04 PROCEDURE — 6360000002 HC RX W HCPCS: Performed by: INTERNAL MEDICINE

## 2021-12-04 PROCEDURE — 2000000000 HC ICU R&B

## 2021-12-04 PROCEDURE — 6370000000 HC RX 637 (ALT 250 FOR IP): Performed by: INTERNAL MEDICINE

## 2021-12-04 PROCEDURE — 2700000000 HC OXYGEN THERAPY PER DAY

## 2021-12-04 PROCEDURE — 2580000003 HC RX 258: Performed by: INTERNAL MEDICINE

## 2021-12-04 PROCEDURE — 85025 COMPLETE CBC W/AUTO DIFF WBC: CPT

## 2021-12-04 PROCEDURE — 6370000000 HC RX 637 (ALT 250 FOR IP): Performed by: NURSE PRACTITIONER

## 2021-12-04 PROCEDURE — 99291 CRITICAL CARE FIRST HOUR: CPT | Performed by: INTERNAL MEDICINE

## 2021-12-04 PROCEDURE — 80053 COMPREHEN METABOLIC PANEL: CPT

## 2021-12-04 PROCEDURE — 2500000003 HC RX 250 WO HCPCS: Performed by: INTERNAL MEDICINE

## 2021-12-04 PROCEDURE — 94660 CPAP INITIATION&MGMT: CPT

## 2021-12-04 RX ADMIN — FAMOTIDINE 20 MG: 20 TABLET ORAL at 20:00

## 2021-12-04 RX ADMIN — ENOXAPARIN SODIUM 30 MG: 100 INJECTION SUBCUTANEOUS at 20:00

## 2021-12-04 RX ADMIN — ENOXAPARIN SODIUM 30 MG: 100 INJECTION SUBCUTANEOUS at 09:15

## 2021-12-04 RX ADMIN — Medication 5 MG: at 20:50

## 2021-12-04 RX ADMIN — Medication 0.5 MCG/KG/HR: at 16:57

## 2021-12-04 RX ADMIN — SODIUM CHLORIDE, PRESERVATIVE FREE 10 ML: 5 INJECTION INTRAVENOUS at 09:14

## 2021-12-04 RX ADMIN — LIOTHYRONINE SODIUM 5 MCG: 5 TABLET ORAL at 09:14

## 2021-12-04 RX ADMIN — ONDANSETRON HYDROCHLORIDE 4 MG: 2 INJECTION, SOLUTION INTRAMUSCULAR; INTRAVENOUS at 20:50

## 2021-12-04 RX ADMIN — FAMOTIDINE 20 MG: 20 TABLET ORAL at 09:14

## 2021-12-04 RX ADMIN — SODIUM CHLORIDE, PRESERVATIVE FREE 10 ML: 5 INJECTION INTRAVENOUS at 20:01

## 2021-12-04 RX ADMIN — BENZONATATE 100 MG: 100 CAPSULE ORAL at 20:50

## 2021-12-04 RX ADMIN — MUPIROCIN: 20 OINTMENT TOPICAL at 20:01

## 2021-12-04 RX ADMIN — LEVOTHYROXINE SODIUM 50 MCG: 0.03 TABLET ORAL at 04:05

## 2021-12-04 RX ADMIN — MULTIPLE VITAMINS W/ MINERALS TAB 1 TABLET: TAB at 09:15

## 2021-12-04 RX ADMIN — MUPIROCIN: 20 OINTMENT TOPICAL at 09:14

## 2021-12-04 RX ADMIN — DEXAMETHASONE SODIUM PHOSPHATE 6 MG: 10 INJECTION INTRAMUSCULAR; INTRAVENOUS at 09:14

## 2021-12-04 ASSESSMENT — PAIN SCALES - GENERAL: PAINLEVEL_OUTOF10: 0

## 2021-12-04 NOTE — PROGRESS NOTES
Pt continues to self prone. On heated high flow oxygen at 40L FiO2 100%. Presents with frequent cough, denying need for PRN at this time. Precedex infusing. Oral care complete. Pt encouraged to reposition head frequently while prone. Denies any other needs or discomforts at this time. Call light in easy reach, bedside table in easy reach, and bed in lowest position.   Jessica Hannah RN

## 2021-12-04 NOTE — PROGRESS NOTES
Internal Medicine ICU Progress Note      Events of Last 24 hours:     71-year-old female admitted to hospital with COVID-19. She has a past medical history of depression. She tested positive for Covid about 10 days ago. She apparently was taking ivermectin at home. She came to the emergency room and had a saturation of 70%. She was admitted to hospital and then transferred the ICU. She is presently on Vapotherm. Pulmonologist discussed with patient and family and apparently she wanted to be a DNI. RN came and told me later on that she was considering intubation. She is unvaccinated. 12/3-was 100% Vapotherm during rounds. She was down to 80% Vapotherm in the prone position but had to be made supine for PICC line insertion. Plan is to reprone her and try to decrease FiO2. She is on Precedex    -patient has been manually self proning. FiO2 down to 80%. On 40 L/min. Refused BiPAP overnight. Invasive Lines: PIV      MV: N/AA    No results for input(s): PHART, RHR2YKA, PO2ART in the last 72 hours. MV Settings:     / / /FiO2 : 70 %    IV:   dexmedetomidine HCl in NaCl 0.3 mcg/kg/hr (21 0119)    sodium chloride 245 mL (21 0637)       Vitals:  Temp  Av.5 °F (36.4 °C)  Min: 97 °F (36.1 °C)  Max: 97.9 °F (36.6 °C)  Pulse  Av.8  Min: 41  Max: 75  BP  Min: 103/54  Max: 134/64  SpO2  Av.3 %  Min: 81 %  Max: 100 %  FiO2   Av.8 %  Min: 70 %  Max: 100 %  Patient Vitals for the past 4 hrs:   BP Temp Temp src Pulse Resp SpO2   21 1210 -- -- -- -- 28 --   21 1200 (!) 113/54 -- -- 65 17 97 %   21 1100 (!) 109/52 97 °F (36.1 °C) Axillary 68 21 99 %   21 1057 -- -- -- 69 17 100 %   21 1000 (!) 111/57 -- -- 68 21 (!) 83 %       CVP:        Intake/Output Summary (Last 24 hours) at 2021 1313  Last data filed at 2021 0537  Gross per 24 hour   Intake 1127.71 ml   Output 1825 ml   Net -697.29 ml       EXAM:  General: Awake and alert. Ill-appearing. In moderate distress. Seen in supine position  Eyes: PERRL. No sclera icterus. No conjunctiva injected. ENT: No discharge. Pharynx clear. Neck: Trachea midline. Normal thyroid. Resp: + accessory muscle use. + crackles. No wheezing. No rhonchi. No dullness on percussion. CV: Regular rate. Regular rhythm. No mumur or rub. No edema. No JVD. Palpable pedal pulses. GI: Non-tender. Non-distended. No masses. No organmegaly. Normal bowel sounds. No hernia. Skin: Warm and dry. No nodule on exposed extremities. No rash on exposed extremities. Lymph: No cervical LAD. No supraclavicular LAD. M/S: No cyanosis. No joint deformity. No clubbing. Neuro: Awake. Follows commands. Positive pupils/gag/corneals. Normal pain response. Psych: Oriented to person, place, time. No anxiety or agitation.      Medications:  Scheduled Meds:   mupirocin   Nasal BID    lidocaine 1 % injection  5 mL IntraDERmal Once    dexamethasone  6 mg IntraVENous Q24H    vitamin D  50,000 Units Oral Weekly    famotidine  20 mg Oral BID    sodium chloride flush  5-40 mL IntraVENous 2 times per day    enoxaparin  30 mg SubCUTAneous BID    levothyroxine  50 mcg Oral Daily    liothyronine  5 mcg Oral Daily    therapeutic multivitamin-minerals  1 tablet Oral Daily    influenza virus vaccine  0.5 mL IntraMUSCular Prior to discharge       PRN Meds:  melatonin, benzonatate, sodium chloride flush, sodium chloride, ondansetron **OR** ondansetron, polyethylene glycol, acetaminophen **OR** acetaminophen, guaiFENesin-dextromethorphan, albuterol sulfate HFA    Results:  CBC:   Recent Labs     12/02/21  0513 12/03/21  0625 12/04/21  0434   WBC 4.7 6.8 4.0   HGB 13.5 13.1 13.7   HCT 39.7 38.6 40.5   MCV 94.3 94.7 94.3    234 224     BMP:   Recent Labs     12/02/21  0513 12/03/21  0625 12/04/21  0434    141 140   K 4.0 3.6 3.8    102 101   CO2 30 28 27   BUN 7 15 21*   CREATININE <0.5* <0.5* 0.6     LIVER PROFILE: Recent Labs     12/02/21  0513 12/03/21  0625 12/04/21  0434   AST 88* 79* 61*   ALT 62* 56* 50*   BILITOT <0.2 0.3 0.3   ALKPHOS 215* 190* 175*   Results for Gisell Hernández (MRN [id number]) as of 12/2/2021 11:46   Ref. Range 12/1/2021 04:30   Procalcitonin Latest Ref Range: 0.00 - 0.15 ng/mL 0.12       Cultures:  COVID positive outside lab    Films:    IR PICC WO SQ PORT/PUMP > 5 YEARS   Preliminary Result   Successful placement of PICC line. CT CHEST PULMONARY EMBOLISM W CONTRAST   Final Result   No evidence of pulmonary embolism. Extensive bilateral multifocal airspace disease compatible with multifocal   pneumonia, specifically COVID pneumonia. XR CHEST PORTABLE   Final Result   Multifocal peripherally located pulmonary opacities are seen, likely related   to COVID-19 pneumonia. Assessment:    Principal Problem:    Acute respiratory disease due to COVID-19 virus  Active Problems:    Depression    Hypokalemia    Elevated LFTs    Leukopenia    Pneumonia due to COVID-19 virus    Acquired hypothyroidism    Transaminitis    Electrolyte disorder    ARDS (adult respiratory distress syndrome) (HCC)  Resolved Problems:    * No resolved hospital problems. *           Plan:    #Acute respiratory failure with hypoxemia due to COVID-19. Patient admitted to hospital.  Pulmonary consultation. Supplemental oxygen. Continuous pulse ox and telemetry. On Vapotherm. May require intubation. CODE STATUS changed to full code. Continue with supervised self proning. Patient agreeable with intubation if required. Wants to postpone as much as possible. #COVID-19 pneumonia. Work-up consistent with COVID-19 pneumonia. She is hypoxic. She meets criteria for treatment. Started on Decadron 12 mg a day. Presently on 6 mg of Decadron. On Baricitinib day#4    #Acquired hypothyroidism. Continue home medication. #Depression.   Stable    #Lovenox 30 mg subcu twice daily    #Transaminitis due to COVID-19      IMPORTANT: Please note that some portions of this note may have been created using Dragon voice recognition software. Some \"sound-alike\" and totally wrong word substitutions may have taken place due to known inherent limitations of any such software, including this voice recognition software. In spite of efforts to eliminate such errors, some may not have been corrected. So please read the note with this in mind and recognize such mistakes and understand the correct version using the  context. If there are still uncertainties in the mind of the medical provider reading this note about any aspect of the note, the provider can feel free to contact me. Thanks. All questions and concerns were addressed to the patient/family. Alternatives to my treatment were discussed. The note was completed using EMR. Every effort was made to ensure accuracy; however, inadvertent computerized transcription errors may be present.          Geraldine Booth MD 1:13 PM 12/4/2021

## 2021-12-04 NOTE — PROGRESS NOTES
Pulmonary & Critical Care Medicine ICU Progress Note    CC: COVID-19    Events of Last 24 hours:   Precedex 0.3 mcg/kg/hr - bradycardia on higher doses   Vapotherm 40 LPM 85% FiO2   Self proning- O2 better   No BiPAP overnight- refused - -->         Vascular lines: IV: PICC 12/3    MV:       / / /FiO2 : 100 %  No results for input(s): PHART, MDQ2ESR, PO2ART in the last 72 hours. IV:   dexmedetomidine HCl in NaCl 0.3 mcg/kg/hr (21 0119)    sodium chloride 245 mL (21 9856)       Vitals:  Blood pressure 120/68, pulse 68, temperature 97.9 °F (36.6 °C), temperature source Oral, resp. rate 18, height 5' 4\" (1.626 m), weight 140 lb (63.5 kg), SpO2 95 %, not currently breastfeeding. On Vapotherm    Temp  Av.6 °F (36.4 °C)  Min: 96.9 °F (36.1 °C)  Max: 98.1 °F (36.7 °C)    Intake/Output Summary (Last 24 hours) at 2021 0722  Last data filed at 2021 0537  Gross per 24 hour   Intake 1127.71 ml   Output 2625 ml   Net -1497.29 ml     PE:  General: ill appearing    Eyes: PERRL. No sclera icterus. No conjunctival injection. ENT: No discharge. Pharynx clear. Neck: Trachea midline. Normal thyroid. Resp: + accessory muscle use. Few crackles. No wheezing. No rhonchi. No dullness on percussion. CV: Regular rate. Regular rhythm. No mumur or rub. No edema. GI: Non-tender. Non-distended. No masses. No organomegaly. Normal bowel sounds. No hernia. Skin: Warm and dry. No nodule on exposed extremities. No rash on exposed extremities. Lymph: No cervical LAD. No supraclavicular LAD. M/S: No cyanosis. No joint deformity. No clubbing. Neuro: AAOx3. Followed commands.  Patellar reflexes are symmetric  Psych: No agitation, no anxiety, affect is full       Scheduled Meds:   mupirocin   Nasal BID    lidocaine 1 % injection  5 mL IntraDERmal Once    dexamethasone  6 mg IntraVENous Q24H    vitamin D  50,000 Units Oral Weekly    famotidine  20 mg Oral BID    sodium chloride flush  5-40 mL IntraVENous 2 times per day    enoxaparin  30 mg SubCUTAneous BID    levothyroxine  50 mcg Oral Daily    liothyronine  5 mcg Oral Daily    therapeutic multivitamin-minerals  1 tablet Oral Daily    influenza virus vaccine  0.5 mL IntraMUSCular Prior to discharge       Data:  CBC:   Recent Labs     12/02/21  0513 12/03/21  0625 12/04/21  0434   WBC 4.7 6.8 4.0   HGB 13.5 13.1 13.7   HCT 39.7 38.6 40.5   MCV 94.3 94.7 94.3    234 224     BMP:   Recent Labs     12/02/21  0513 12/03/21  0625 12/04/21  0434    141 140   K 4.0 3.6 3.8    102 101   CO2 30 28 27   BUN 7 15 21*   CREATININE <0.5* <0.5* 0.6     LIVER PROFILE:   Recent Labs     12/02/21  0513 12/03/21  0625 12/04/21  0434   AST 88* 79* 61*   ALT 62* 56* 50*   BILITOT <0.2 0.3 0.3   ALKPHOS 215* 190* 175*       Microbiology:  12/1 [facility] NGTD    Imaging:  CTPA 12/1 imaging reviewed by me and showed  No evidence of pulmonary embolism. Extensive bilateral multifocal airspace disease compatible with multifocal   pneumonia, specifically COVID pneumonia          ASSESSMENT:  · Acute hypoxemic respiratory failure -severe progressive life-threatening  · COVID-19 viral pneumonia  · Transminitis   · Electrolytes disorder   · Not vaccinated for COVID-19        PLAN:  Vapotherm for life-threatening acute hypoxemic respiratory failure and titrate to maintain SaO2 >92%  Bilevel non-invasive positive pressure ventilation per my orders- change 12/8   Supervised self proning   Off Abx- low procalcitonin   Continuous pulse oximetry  Droplet plus isolation (surgical mask, eye protection, gown, glove)  Dexamethasone 6 mg IV D#4-monitor blood glucose closely  Tocilizumab 12/3/21. Post 2 doses of Baricitinib   DVT prophylaxis: Lovenox BID   MRSA prophylaxis: Bactroban  Discussed with daughter at the bedside today and multiple good questions were answered.   Patient now would want to be intubated when it comes to it and no other options to keep her O2 up, wants to delay as much as possible. Total critical care time caring for this patient with life threatening, unstable organ failure, including direct patient contact, management of life support systems, review of data including imaging and labs, discussions with other team members and physicians is 31 minutes, excluding procedures.

## 2021-12-04 NOTE — PROGRESS NOTES
Reassessment completed, pt sitting in bed eating lunch with vapotherm 40L 85%, SPO2 91%.  Call light within reach

## 2021-12-04 NOTE — PROGRESS NOTES
Shift assessment completed as documented on flowsheet. Pt awake and alert, ox4, lungs diminished  But is currently self proning. Vapotherm 40L 100% spo2 95%. Denies pain or discomfort. Jesus patent to bsd.  Call light within reach

## 2021-12-04 NOTE — PROGRESS NOTES
Reassessment completed as documented, pt currently on bipap and tolerating without concerns. Resting comfortably.

## 2021-12-04 NOTE — PROGRESS NOTES
Miguel Jones updated on pt current condition. No further questions by family at this time.  Sánchez Dove RN

## 2021-12-04 NOTE — PROGRESS NOTES
Shift assessment complete see flowsheets. Medications given, see MAR. Pt remains on heated high-flow oxygen @40L and FiO2 100%. Lung sounds diminished. A+Ox4. Frequently reporting dry mouth. Mouth swabs and moisturizer along with instruction provided to pt. Pt requests to self prone, assisted. Precedex infusing, see MAR. Requesting PRN melatonin, given. Denies any other needs or discomforts at this time. Call light in easy reach, bedside table in easy reach, and bed in lowest position.   Dulce Mayes RN

## 2021-12-05 LAB
A/G RATIO: 1.2 (ref 1.1–2.2)
ALBUMIN SERPL-MCNC: 3.2 G/DL (ref 3.4–5)
ALP BLD-CCNC: 174 U/L (ref 40–129)
ALT SERPL-CCNC: 47 U/L (ref 10–40)
ANION GAP SERPL CALCULATED.3IONS-SCNC: 8 MMOL/L (ref 3–16)
AST SERPL-CCNC: 53 U/L (ref 15–37)
BASOPHILS ABSOLUTE: 0 K/UL (ref 0–0.2)
BASOPHILS RELATIVE PERCENT: 0 %
BILIRUB SERPL-MCNC: 0.3 MG/DL (ref 0–1)
BLOOD CULTURE, ROUTINE: NORMAL
BUN BLDV-MCNC: 23 MG/DL (ref 7–20)
CALCIUM SERPL-MCNC: 8.5 MG/DL (ref 8.3–10.6)
CHLORIDE BLD-SCNC: 100 MMOL/L (ref 99–110)
CO2: 31 MMOL/L (ref 21–32)
CREAT SERPL-MCNC: <0.5 MG/DL (ref 0.6–1.2)
CULTURE, BLOOD 2: NORMAL
EOSINOPHILS ABSOLUTE: 0 K/UL (ref 0–0.6)
EOSINOPHILS RELATIVE PERCENT: 0.1 %
GFR AFRICAN AMERICAN: >60
GFR NON-AFRICAN AMERICAN: >60
GLUCOSE BLD-MCNC: 138 MG/DL (ref 70–99)
GLUCOSE BLD-MCNC: 94 MG/DL (ref 70–99)
GLUCOSE BLD-MCNC: 96 MG/DL (ref 70–99)
HCT VFR BLD CALC: 39.9 % (ref 36–48)
HEMOGLOBIN: 13.6 G/DL (ref 12–16)
LYMPHOCYTES ABSOLUTE: 0.9 K/UL (ref 1–5.1)
LYMPHOCYTES RELATIVE PERCENT: 15.2 %
MCH RBC QN AUTO: 31.7 PG (ref 26–34)
MCHC RBC AUTO-ENTMCNC: 34 G/DL (ref 31–36)
MCV RBC AUTO: 93.4 FL (ref 80–100)
MONOCYTES ABSOLUTE: 0.6 K/UL (ref 0–1.3)
MONOCYTES RELATIVE PERCENT: 10.7 %
NEUTROPHILS ABSOLUTE: 4.2 K/UL (ref 1.7–7.7)
NEUTROPHILS RELATIVE PERCENT: 74 %
PDW BLD-RTO: 13.4 % (ref 12.4–15.4)
PERFORMED ON: ABNORMAL
PERFORMED ON: NORMAL
PLATELET # BLD: 251 K/UL (ref 135–450)
PMV BLD AUTO: 8.2 FL (ref 5–10.5)
POTASSIUM REFLEX MAGNESIUM: 3.6 MMOL/L (ref 3.5–5.1)
RBC # BLD: 4.27 M/UL (ref 4–5.2)
SODIUM BLD-SCNC: 139 MMOL/L (ref 136–145)
TOTAL PROTEIN: 5.8 G/DL (ref 6.4–8.2)
WBC # BLD: 5.7 K/UL (ref 4–11)

## 2021-12-05 PROCEDURE — 99233 SBSQ HOSP IP/OBS HIGH 50: CPT | Performed by: INTERNAL MEDICINE

## 2021-12-05 PROCEDURE — 6370000000 HC RX 637 (ALT 250 FOR IP): Performed by: INTERNAL MEDICINE

## 2021-12-05 PROCEDURE — 94660 CPAP INITIATION&MGMT: CPT

## 2021-12-05 PROCEDURE — 94761 N-INVAS EAR/PLS OXIMETRY MLT: CPT

## 2021-12-05 PROCEDURE — 6360000002 HC RX W HCPCS: Performed by: INTERNAL MEDICINE

## 2021-12-05 PROCEDURE — 2500000003 HC RX 250 WO HCPCS: Performed by: INTERNAL MEDICINE

## 2021-12-05 PROCEDURE — 2700000000 HC OXYGEN THERAPY PER DAY

## 2021-12-05 PROCEDURE — 2000000000 HC ICU R&B

## 2021-12-05 PROCEDURE — 99291 CRITICAL CARE FIRST HOUR: CPT | Performed by: INTERNAL MEDICINE

## 2021-12-05 PROCEDURE — 6370000000 HC RX 637 (ALT 250 FOR IP): Performed by: NURSE PRACTITIONER

## 2021-12-05 PROCEDURE — 2580000003 HC RX 258: Performed by: INTERNAL MEDICINE

## 2021-12-05 PROCEDURE — 80053 COMPREHEN METABOLIC PANEL: CPT

## 2021-12-05 PROCEDURE — 85025 COMPLETE CBC W/AUTO DIFF WBC: CPT

## 2021-12-05 RX ORDER — POTASSIUM CHLORIDE 29.8 MG/ML
20 INJECTION INTRAVENOUS ONCE
Status: COMPLETED | OUTPATIENT
Start: 2021-12-05 | End: 2021-12-05

## 2021-12-05 RX ADMIN — MUPIROCIN: 20 OINTMENT TOPICAL at 21:02

## 2021-12-05 RX ADMIN — Medication 0.5 MCG/KG/HR: at 06:31

## 2021-12-05 RX ADMIN — FAMOTIDINE 20 MG: 20 TABLET ORAL at 21:02

## 2021-12-05 RX ADMIN — SODIUM CHLORIDE, PRESERVATIVE FREE 10 ML: 5 INJECTION INTRAVENOUS at 08:11

## 2021-12-05 RX ADMIN — SODIUM CHLORIDE, PRESERVATIVE FREE 10 ML: 5 INJECTION INTRAVENOUS at 21:02

## 2021-12-05 RX ADMIN — GUAIFENESIN AND DEXTROMETHORPHAN 5 ML: 100; 10 SYRUP ORAL at 13:09

## 2021-12-05 RX ADMIN — LIOTHYRONINE SODIUM 5 MCG: 5 TABLET ORAL at 08:31

## 2021-12-05 RX ADMIN — POTASSIUM CHLORIDE 20 MEQ: 400 INJECTION, SOLUTION INTRAVENOUS at 13:07

## 2021-12-05 RX ADMIN — LEVOTHYROXINE SODIUM 50 MCG: 0.03 TABLET ORAL at 06:30

## 2021-12-05 RX ADMIN — ENOXAPARIN SODIUM 30 MG: 100 INJECTION SUBCUTANEOUS at 08:10

## 2021-12-05 RX ADMIN — Medication 0.5 MCG/KG/HR: at 17:34

## 2021-12-05 RX ADMIN — MUPIROCIN: 20 OINTMENT TOPICAL at 08:11

## 2021-12-05 RX ADMIN — DEXAMETHASONE SODIUM PHOSPHATE 6 MG: 10 INJECTION INTRAMUSCULAR; INTRAVENOUS at 08:11

## 2021-12-05 RX ADMIN — ENOXAPARIN SODIUM 30 MG: 100 INJECTION SUBCUTANEOUS at 21:01

## 2021-12-05 RX ADMIN — MULTIPLE VITAMINS W/ MINERALS TAB 1 TABLET: TAB at 08:10

## 2021-12-05 RX ADMIN — FAMOTIDINE 20 MG: 20 TABLET ORAL at 08:10

## 2021-12-05 ASSESSMENT — PAIN SCALES - GENERAL
PAINLEVEL_OUTOF10: 0

## 2021-12-05 NOTE — PROGRESS NOTES
Shift assessment completed as documented on flowsheet. VSS pt awake and alert, ox4. Lungs diminished. Pt on vapotherm 40L 75% in supine position with spo2 95%. Denies pain or discomfort at this time. Lee patent. No concerns noted at this time.

## 2021-12-05 NOTE — PROGRESS NOTES
12/05/21 0300   NIV Type   $NIV $Daily Charge   NIV Started/Stopped On   Equipment Type v60   Mode Bilevel   Mask Type Full face mask   Mask Size Medium   Settings/Measurements   IPAP 12 cmH20   CPAP/EPAP 8 cmH2O   Rate Ordered 16   Resp 19   FiO2  55 %  (decreased to 45)   I Time/ I Time % 1 s   Vt Exhaled 411 mL   Minute Volume 7.8 Liters   Mask Leak (lpm) 4 lpm   Comfort Level Good   Using Accessory Muscles No   SpO2 98

## 2021-12-05 NOTE — PROGRESS NOTES
12/04/21 1920   NIV Type   NIV Started/Stopped On   Equipment Type V60   Mode Bilevel   Mask Type Full face mask   Mask Size Medium   Settings/Measurements   IPAP 12 cmH20   CPAP/EPAP 8 cmH2O   Rate Ordered 16   Resp 21   Insp Rise Time (%) 2 %   FiO2  55 %   I Time/ I Time % 1 s   Vt Exhaled 450 mL   Minute Volume 8.6 Liters   Mask Leak (lpm) 22 lpm   Comfort Level Good   Using Accessory Muscles No   SpO2 95

## 2021-12-05 NOTE — PROGRESS NOTES
Internal Medicine ICU Progress Note      Events of Last 24 hours:     56-year-old female admitted to hospital with COVID-19. She has a past medical history of depression. She tested positive for Covid about 10 days ago. She apparently was taking ivermectin at home. She came to the emergency room and had a saturation of 70%. She was admitted to hospital and then transferred the ICU. She is presently on Vapotherm. Pulmonologist discussed with patient and family and apparently she wanted to be a DNI. RN came and told me later on that she was considering intubation. She is unvaccinated. 12/3-was 100% Vapotherm during rounds. She was down to 80% Vapotherm in the prone position but had to be made supine for PICC line insertion. Plan is to reprone her and try to decrease FiO2. She is on Precedex    -patient has been manually self proning. FiO2 down to 80%. On 40 L/min. Refused BiPAP overnight. - used Bipap last night. On 80% vapo therm. Feeling about the same. Invasive Lines: PIV      MV: N/AA    No results for input(s): PHART, EQA2XUN, PO2ART in the last 72 hours.     MV Settings:     / / /FiO2 : 60 %    IV:   dexmedetomidine HCl in NaCl 0.5 mcg/kg/hr (21 0631)    sodium chloride Stopped (21 1632)       Vitals:  Temp  Av.9 °F (36.6 °C)  Min: 97.5 °F (36.4 °C)  Max: 98.2 °F (36.8 °C)  Pulse  Av.8  Min: 41  Max: 84  BP  Min: 92/66  Max: 150/70  SpO2  Av %  Min: 88 %  Max: 100 %  FiO2   Av.4 %  Min: 55 %  Max: 75 %  Patient Vitals for the past 4 hrs:   BP Temp Temp src Pulse Resp SpO2   21 1200 (!) 106/50 -- -- 63 18 91 %   21 1100 112/63 -- -- 71 21 93 %   21 1056 -- 97.8 °F (36.6 °C) Axillary -- -- --   21 1000 (!) 98/46 -- -- 73 22 90 %       CVP:        Intake/Output Summary (Last 24 hours) at 2021 1310  Last data filed at 2021 0500  Gross per 24 hour   Intake 496.73 ml   Output 1650 ml   Net -1153.27 ml EXAM:  General: Awake and alert. Ill-appearing. In moderate distress. Seen in supine position  Eyes: PERRL. No sclera icterus. No conjunctiva injected. ENT: No discharge. Pharynx clear. Neck: Trachea midline. Normal thyroid. Resp: + accessory muscle use. + crackles. No wheezing. No rhonchi. No dullness on percussion. CV: Regular rate. Regular rhythm. No mumur or rub. No edema. No JVD. Palpable pedal pulses. GI: Non-tender. Non-distended. No masses. No organmegaly. Normal bowel sounds. No hernia. Skin: Warm and dry. No nodule on exposed extremities. No rash on exposed extremities. Lymph: No cervical LAD. No supraclavicular LAD. M/S: No cyanosis. No joint deformity. No clubbing. Neuro: Awake. Follows commands. Positive pupils/gag/corneals. Normal pain response. Psych: Oriented to person, place, time. No anxiety or agitation.      Medications:  Scheduled Meds:   mupirocin   Nasal BID    lidocaine 1 % injection  5 mL IntraDERmal Once    dexamethasone  6 mg IntraVENous Q24H    vitamin D  50,000 Units Oral Weekly    famotidine  20 mg Oral BID    sodium chloride flush  5-40 mL IntraVENous 2 times per day    enoxaparin  30 mg SubCUTAneous BID    levothyroxine  50 mcg Oral Daily    liothyronine  5 mcg Oral Daily    therapeutic multivitamin-minerals  1 tablet Oral Daily    influenza virus vaccine  0.5 mL IntraMUSCular Prior to discharge       PRN Meds:  melatonin, benzonatate, sodium chloride flush, sodium chloride, ondansetron **OR** ondansetron, polyethylene glycol, acetaminophen **OR** acetaminophen, guaiFENesin-dextromethorphan, albuterol sulfate HFA    Results:  CBC:   Recent Labs     12/03/21  0625 12/04/21  0434 12/05/21  0525   WBC 6.8 4.0 5.7   HGB 13.1 13.7 13.6   HCT 38.6 40.5 39.9   MCV 94.7 94.3 93.4    224 251     BMP:   Recent Labs     12/03/21  0625 12/04/21  0434 12/05/21  0525    140 139   K 3.6 3.8 3.6    101 100   CO2 28 27 31   BUN 15 21* 23* CREATININE <0.5* 0.6 <0.5*     LIVER PROFILE:   Recent Labs     12/03/21  0625 12/04/21  0434 12/05/21  0525   AST 79* 61* 53*   ALT 56* 50* 47*   BILITOT 0.3 0.3 0.3   ALKPHOS 190* 175* 174*   Results for Gregg Beard (MRN [id number]) as of 12/2/2021 11:46   Ref. Range 12/1/2021 04:30   Procalcitonin Latest Ref Range: 0.00 - 0.15 ng/mL 0.12       Cultures:  COVID positive outside lab    Films:    IR PICC WO SQ PORT/PUMP > 5 YEARS   Preliminary Result   Successful placement of PICC line. CT CHEST PULMONARY EMBOLISM W CONTRAST   Final Result   No evidence of pulmonary embolism. Extensive bilateral multifocal airspace disease compatible with multifocal   pneumonia, specifically COVID pneumonia. XR CHEST PORTABLE   Final Result   Multifocal peripherally located pulmonary opacities are seen, likely related   to COVID-19 pneumonia. Assessment:    Principal Problem:    Acute respiratory disease due to COVID-19 virus  Active Problems:    Depression    Hypokalemia    Elevated LFTs    Leukopenia    Pneumonia due to COVID-19 virus    Acquired hypothyroidism    Transaminitis    Electrolyte disorder    ARDS (adult respiratory distress syndrome) (HCC)  Resolved Problems:    * No resolved hospital problems. *       Plan:    #Acute respiratory failure with hypoxemia due to COVID-19. Patient admitted to hospital.  Pulmonary consultation. Supplemental oxygen. Continuous pulse ox and telemetry. On Vapotherm. May require intubation. CODE STATUS changed to full code. Continue with supervised self proning. Patient agreeable with intubation if required. Wants to postpone intubation as much as possible. #COVID-19 pneumonia. Work-up consistent with COVID-19 pneumonia. She is hypoxic. She meets criteria for treatment. Started on Decadron 12 mg a day. Presently on 6 mg of Decadron. On Baricitinib day#5    #Acquired hypothyroidism. Continue home medication. #Depression. Stable    #Lovenox 30 mg subcu twice daily    #Transaminitis due to COVID-19      IMPORTANT: Please note that some portions of this note may have been created using Dragon voice recognition software. Some \"sound-alike\" and totally wrong word substitutions may have taken place due to known inherent limitations of any such software, including this voice recognition software. In spite of efforts to eliminate such errors, some may not have been corrected. So please read the note with this in mind and recognize such mistakes and understand the correct version using the  context. If there are still uncertainties in the mind of the medical provider reading this note about any aspect of the note, the provider can feel free to contact me. Thanks. All questions and concerns were addressed to the patient/family. Alternatives to my treatment were discussed. The note was completed using EMR. Every effort was made to ensure accuracy; however, inadvertent computerized transcription errors may be present.          Lonnie Duque MD 1:10 PM 12/5/2021

## 2021-12-05 NOTE — PROGRESS NOTES
RT Inhaler-Nebulizer Bronchodilator Protocol Note    There is a bronchodilator order in the chart from a provider indicating to follow the RT Bronchodilator Protocol and there is an Initiate RT Inhaler-Nebulizer Bronchodilator Protocol order as well (see protocol at bottom of note). CXR Findings:  No results found. The findings from the last RT Protocol Assessment were as follows:   History Pulmonary Disease: None or smoker <15 pack years  Respiratory Pattern: Regular pattern and RR 12-20 bpm  Breath Sounds: Clear breath sounds  Cough: Strong, spontaneous, non-productive  Indication for Bronchodilator Therapy: Decreased or absent breath sounds  Bronchodilator Assessment Score: 0    Aerosolized bronchodilator medication orders have been revised according to the RT Inhaler-Nebulizer Bronchodilator Protocol below. Respiratory Therapist to perform RT Therapy Protocol Assessment initially then follow the protocol. Repeat RT Therapy Protocol Assessment PRN for score 0-3 or on second treatment, BID, and PRN for scores above 3. No Indications - adjust the frequency to every 6 hours PRN wheezing or bronchospasm, if no treatments needed after 48 hours then discontinue using Per Protocol order mode. If indication present, adjust the RT bronchodilator orders based on the Bronchodilator Assessment Score as indicated below. Use Inhaler orders unless patient has one or more of the following: on home nebulizer, not able to hold breath for 10 seconds, is not alert and oriented, cannot activate and use MDI correctly, or respiratory rate 25 breaths per minute or more, then use the equivalent nebulizer order(s) with same Frequency and PRN reasons based on the score. If a patient is on this medication at home then do not decrease Frequency below that used at home.     0-3 - enter or revise RT bronchodilator order(s) to equivalent RT Bronchodilator order with Frequency of every 4 hours PRN for wheezing or increased work of breathing using Per Protocol order mode. 4-6 - enter or revise RT Bronchodilator order(s) to two equivalent RT bronchodilator orders with one order with BID Frequency and one order with Frequency of every 4 hours PRN wheezing or increased work of breathing using Per Protocol order mode. 7-10 - enter or revise RT Bronchodilator order(s) to two equivalent RT bronchodilator orders with one order with TID Frequency and one order with Frequency of every 4 hours PRN wheezing or increased work of breathing using Per Protocol order mode. 11-13 - enter or revise RT Bronchodilator order(s) to one equivalent RT bronchodilator order with QID Frequency and an Albuterol order with Frequency of every 4 hours PRN wheezing or increased work of breathing using Per Protocol order mode. Greater than 13 - enter or revise RT Bronchodilator order(s) to one equivalent RT bronchodilator order with every 4 hours Frequency and an Albuterol order with Frequency of every 2 hours PRN wheezing or increased work of breathing using Per Protocol order mode. RT to enter RT Home Evaluation for COPD & MDI Assessment order using Per Protocol order mode.     Electronically signed by Renate Guillaume RCP on 12/5/2021 at 1:57 PM

## 2021-12-05 NOTE — PROGRESS NOTES
12/04/21 2255   NIV Type   NIV Started/Stopped On   Equipment Type V60   Mode Bilevel   Mask Type Full face mask   Mask Size Medium   Settings/Measurements   IPAP 12 cmH20   CPAP/EPAP 8 cmH2O   Rate Ordered 16   Resp 21   Insp Rise Time (%) 2 %   FiO2  55 %   I Time/ I Time % 1 s   Vt Exhaled 434 mL   Minute Volume 7.7 Liters   Mask Leak (lpm) 3 lpm   Comfort Level Good   Using Accessory Muscles No   SpO2 95

## 2021-12-05 NOTE — PROGRESS NOTES
Pulmonary & Critical Care Medicine ICU Progress Note    CC: COVID-19    Events of Last 24 hours:   Precedex 0.6 mcg/kg/hr   BiPAP overnight  45% FiO2   Vapotherm 40 LPM 75% FiO2           Vascular lines: IV: PICC 12/3    MV:       / / /FiO2 : (S) 55 % (decreased to 45)  No results for input(s): PHART, FVA3GHK, PO2ART in the last 72 hours. IV:   dexmedetomidine HCl in NaCl 0.5 mcg/kg/hr (21 0631)    sodium chloride Stopped (21 1632)       Vitals:  Blood pressure 112/60, pulse 51, temperature 97.5 °F (36.4 °C), temperature source Axillary, resp. rate 20, height 5' 4\" (1.626 m), weight 140 lb (63.5 kg), SpO2 98 %, not currently breastfeeding. On Vapotherm    Temp  Av.7 °F (36.5 °C)  Min: 97 °F (36.1 °C)  Max: 98.2 °F (36.8 °C)    Intake/Output Summary (Last 24 hours) at 2021 0720  Last data filed at 2021 0500  Gross per 24 hour   Intake 496.73 ml   Output 1650 ml   Net -1153.27 ml     PE:  General: ill appearing    Eyes: PERRL. No sclera icterus. No conjunctival injection. ENT: No discharge. Pharynx clear. Neck: Trachea midline. Normal thyroid. Resp: + accessory muscle use. Few crackles. No wheezing. No rhonchi. No dullness on percussion. CV: Regular rate. Regular rhythm. No mumur or rub. No edema. GI: Non-tender. Non-distended. No masses. No organomegaly. Normal bowel sounds. No hernia. Skin: Warm and dry. No nodule on exposed extremities. No rash on exposed extremities. Lymph: No cervical LAD. No supraclavicular LAD. M/S: No cyanosis. No joint deformity. No clubbing. Neuro: AAOx3. Followed commands.  Patellar reflexes are symmetric  Psych: No agitation, no anxiety, affect is full       Scheduled Meds:   EPINEPHrine        mupirocin   Nasal BID    lidocaine 1 % injection  5 mL IntraDERmal Once    dexamethasone  6 mg IntraVENous Q24H    vitamin D  50,000 Units Oral Weekly    famotidine  20 mg Oral BID    sodium chloride flush  5-40 mL IntraVENous 2 times per day    enoxaparin  30 mg SubCUTAneous BID    levothyroxine  50 mcg Oral Daily    liothyronine  5 mcg Oral Daily    therapeutic multivitamin-minerals  1 tablet Oral Daily    influenza virus vaccine  0.5 mL IntraMUSCular Prior to discharge       Data:  CBC:   Recent Labs     12/03/21  0625 12/04/21  0434 12/05/21  0525   WBC 6.8 4.0 5.7   HGB 13.1 13.7 13.6   HCT 38.6 40.5 39.9   MCV 94.7 94.3 93.4    224 251     BMP:   Recent Labs     12/03/21  0625 12/04/21  0434 12/05/21  0525    140 139   K 3.6 3.8 3.6    101 100   CO2 28 27 31   BUN 15 21* 23*   CREATININE <0.5* 0.6 <0.5*     LIVER PROFILE:   Recent Labs     12/03/21  0625 12/04/21  0434 12/05/21  0525   AST 79* 61* 53*   ALT 56* 50* 47*   BILITOT 0.3 0.3 0.3   ALKPHOS 190* 175* 174*       Microbiology:  12/1 [facility] NGTD    Imaging:  CTPA 12/1 imaging reviewed by me and showed  No evidence of pulmonary embolism. Extensive bilateral multifocal airspace disease compatible with multifocal   pneumonia, specifically COVID pneumonia          ASSESSMENT:  · Acute hypoxemic respiratory failure -severe progressive life-threatening  · COVID-19 viral pneumonia  · Transminitis   · Electrolytes disorder   · Not vaccinated for COVID-19        PLAN:  Vapotherm for life-threatening acute hypoxemic respiratory failure and titrate to maintain SaO2 >92%  Bilevel non-invasive positive pressure ventilation per my orders- change 12/8   Precedex only with BIPAP if needed   Supervised self proning   Off Abx- low procalcitonin   Continuous pulse oximetry  Droplet plus isolation (surgical mask, eye protection, gown, glove)  Dexamethasone 6 mg IV D#5-monitor blood glucose closely  Tocilizumab 12/3/21.  Post 2 doses of Baricitinib   DVT prophylaxis: Lovenox BID   MRSA prophylaxis: Bactroban  Full code         Total critical care time caring for this patient with life threatening, unstable organ failure, including direct patient contact, management of life support

## 2021-12-06 ENCOUNTER — APPOINTMENT (OUTPATIENT)
Dept: GENERAL RADIOLOGY | Age: 71
DRG: 177 | End: 2021-12-06
Payer: MEDICARE

## 2021-12-06 LAB
A/G RATIO: 1.2 (ref 1.1–2.2)
ALBUMIN SERPL-MCNC: 2.9 G/DL (ref 3.4–5)
ALP BLD-CCNC: 157 U/L (ref 40–129)
ALT SERPL-CCNC: 36 U/L (ref 10–40)
ANION GAP SERPL CALCULATED.3IONS-SCNC: 10 MMOL/L (ref 3–16)
AST SERPL-CCNC: 49 U/L (ref 15–37)
BASOPHILS ABSOLUTE: 0 K/UL (ref 0–0.2)
BASOPHILS RELATIVE PERCENT: 0.3 %
BILIRUB SERPL-MCNC: 0.4 MG/DL (ref 0–1)
BUN BLDV-MCNC: 16 MG/DL (ref 7–20)
CALCIUM SERPL-MCNC: 8 MG/DL (ref 8.3–10.6)
CHLORIDE BLD-SCNC: 99 MMOL/L (ref 99–110)
CO2: 30 MMOL/L (ref 21–32)
CREAT SERPL-MCNC: <0.5 MG/DL (ref 0.6–1.2)
EOSINOPHILS ABSOLUTE: 0 K/UL (ref 0–0.6)
EOSINOPHILS RELATIVE PERCENT: 0.6 %
GFR AFRICAN AMERICAN: >60
GFR NON-AFRICAN AMERICAN: >60
GLUCOSE BLD-MCNC: 101 MG/DL (ref 70–99)
GLUCOSE BLD-MCNC: 128 MG/DL (ref 70–99)
GLUCOSE BLD-MCNC: 148 MG/DL (ref 70–99)
GLUCOSE BLD-MCNC: 94 MG/DL (ref 70–99)
HCT VFR BLD CALC: 40.4 % (ref 36–48)
HEMOGLOBIN: 13.7 G/DL (ref 12–16)
LYMPHOCYTES ABSOLUTE: 1.2 K/UL (ref 1–5.1)
LYMPHOCYTES RELATIVE PERCENT: 15.3 %
MCH RBC QN AUTO: 31.4 PG (ref 26–34)
MCHC RBC AUTO-ENTMCNC: 33.8 G/DL (ref 31–36)
MCV RBC AUTO: 92.9 FL (ref 80–100)
MONOCYTES ABSOLUTE: 0.5 K/UL (ref 0–1.3)
MONOCYTES RELATIVE PERCENT: 6.3 %
NEUTROPHILS ABSOLUTE: 6.1 K/UL (ref 1.7–7.7)
NEUTROPHILS RELATIVE PERCENT: 77.5 %
PDW BLD-RTO: 13.1 % (ref 12.4–15.4)
PERFORMED ON: ABNORMAL
PLATELET # BLD: 217 K/UL (ref 135–450)
PMV BLD AUTO: 8.1 FL (ref 5–10.5)
POTASSIUM REFLEX MAGNESIUM: 3.6 MMOL/L (ref 3.5–5.1)
PROCALCITONIN: 0.05 NG/ML (ref 0–0.15)
RBC # BLD: 4.35 M/UL (ref 4–5.2)
SODIUM BLD-SCNC: 139 MMOL/L (ref 136–145)
TOTAL PROTEIN: 5.4 G/DL (ref 6.4–8.2)
WBC # BLD: 7.9 K/UL (ref 4–11)

## 2021-12-06 PROCEDURE — 6370000000 HC RX 637 (ALT 250 FOR IP): Performed by: INTERNAL MEDICINE

## 2021-12-06 PROCEDURE — 80053 COMPREHEN METABOLIC PANEL: CPT

## 2021-12-06 PROCEDURE — 99291 CRITICAL CARE FIRST HOUR: CPT | Performed by: INTERNAL MEDICINE

## 2021-12-06 PROCEDURE — 85025 COMPLETE CBC W/AUTO DIFF WBC: CPT

## 2021-12-06 PROCEDURE — 84145 PROCALCITONIN (PCT): CPT

## 2021-12-06 PROCEDURE — 6360000002 HC RX W HCPCS: Performed by: INTERNAL MEDICINE

## 2021-12-06 PROCEDURE — 99233 SBSQ HOSP IP/OBS HIGH 50: CPT | Performed by: INTERNAL MEDICINE

## 2021-12-06 PROCEDURE — 71045 X-RAY EXAM CHEST 1 VIEW: CPT

## 2021-12-06 PROCEDURE — 2500000003 HC RX 250 WO HCPCS: Performed by: INTERNAL MEDICINE

## 2021-12-06 PROCEDURE — 2000000000 HC ICU R&B

## 2021-12-06 PROCEDURE — 94660 CPAP INITIATION&MGMT: CPT

## 2021-12-06 PROCEDURE — 2580000003 HC RX 258: Performed by: INTERNAL MEDICINE

## 2021-12-06 PROCEDURE — 94761 N-INVAS EAR/PLS OXIMETRY MLT: CPT

## 2021-12-06 PROCEDURE — 6370000000 HC RX 637 (ALT 250 FOR IP): Performed by: NURSE PRACTITIONER

## 2021-12-06 PROCEDURE — 2700000000 HC OXYGEN THERAPY PER DAY

## 2021-12-06 RX ORDER — POTASSIUM CHLORIDE 29.8 MG/ML
20 INJECTION INTRAVENOUS ONCE
Status: COMPLETED | OUTPATIENT
Start: 2021-12-06 | End: 2021-12-06

## 2021-12-06 RX ORDER — FUROSEMIDE 10 MG/ML
20 INJECTION INTRAMUSCULAR; INTRAVENOUS ONCE
Status: COMPLETED | OUTPATIENT
Start: 2021-12-06 | End: 2021-12-06

## 2021-12-06 RX ORDER — QUETIAPINE FUMARATE 25 MG/1
12.5 TABLET, FILM COATED ORAL 2 TIMES DAILY
Status: DISCONTINUED | OUTPATIENT
Start: 2021-12-06 | End: 2021-12-23 | Stop reason: HOSPADM

## 2021-12-06 RX ORDER — ESCITALOPRAM OXALATE 10 MG/1
5 TABLET ORAL DAILY
Status: DISCONTINUED | OUTPATIENT
Start: 2021-12-06 | End: 2021-12-23 | Stop reason: HOSPADM

## 2021-12-06 RX ORDER — LEVOFLOXACIN 5 MG/ML
500 INJECTION, SOLUTION INTRAVENOUS EVERY 24 HOURS
Status: DISCONTINUED | OUTPATIENT
Start: 2021-12-06 | End: 2021-12-10

## 2021-12-06 RX ADMIN — MULTIPLE VITAMINS W/ MINERALS TAB 1 TABLET: TAB at 10:00

## 2021-12-06 RX ADMIN — POTASSIUM CHLORIDE 20 MEQ: 400 INJECTION, SOLUTION INTRAVENOUS at 09:59

## 2021-12-06 RX ADMIN — MUPIROCIN: 20 OINTMENT TOPICAL at 21:08

## 2021-12-06 RX ADMIN — QUETIAPINE FUMARATE 12.5 MG: 25 TABLET ORAL at 10:00

## 2021-12-06 RX ADMIN — FUROSEMIDE 20 MG: 10 INJECTION, SOLUTION INTRAVENOUS at 16:25

## 2021-12-06 RX ADMIN — LEVOFLOXACIN 500 MG: 500 INJECTION, SOLUTION INTRAVENOUS at 16:28

## 2021-12-06 RX ADMIN — ONDANSETRON 4 MG: 4 TABLET, ORALLY DISINTEGRATING ORAL at 04:44

## 2021-12-06 RX ADMIN — LEVOTHYROXINE SODIUM 50 MCG: 0.03 TABLET ORAL at 10:00

## 2021-12-06 RX ADMIN — QUETIAPINE FUMARATE 12.5 MG: 25 TABLET ORAL at 21:07

## 2021-12-06 RX ADMIN — SODIUM CHLORIDE, PRESERVATIVE FREE 10 ML: 5 INJECTION INTRAVENOUS at 21:08

## 2021-12-06 RX ADMIN — ENOXAPARIN SODIUM 30 MG: 100 INJECTION SUBCUTANEOUS at 10:01

## 2021-12-06 RX ADMIN — Medication 0.7 MCG/KG/HR: at 06:27

## 2021-12-06 RX ADMIN — ENOXAPARIN SODIUM 30 MG: 100 INJECTION SUBCUTANEOUS at 21:08

## 2021-12-06 RX ADMIN — GUAIFENESIN AND DEXTROMETHORPHAN 5 ML: 100; 10 SYRUP ORAL at 10:00

## 2021-12-06 RX ADMIN — Medication 0.2 MCG/KG/HR: at 16:32

## 2021-12-06 RX ADMIN — BENZONATATE 100 MG: 100 CAPSULE ORAL at 10:00

## 2021-12-06 RX ADMIN — ESCITALOPRAM OXALATE 5 MG: 10 TABLET ORAL at 10:00

## 2021-12-06 RX ADMIN — FAMOTIDINE 20 MG: 20 TABLET ORAL at 21:08

## 2021-12-06 RX ADMIN — FAMOTIDINE 20 MG: 20 TABLET ORAL at 10:00

## 2021-12-06 RX ADMIN — DEXAMETHASONE SODIUM PHOSPHATE 6 MG: 10 INJECTION INTRAMUSCULAR; INTRAVENOUS at 10:01

## 2021-12-06 RX ADMIN — MUPIROCIN: 20 OINTMENT TOPICAL at 10:00

## 2021-12-06 RX ADMIN — SODIUM CHLORIDE, PRESERVATIVE FREE 10 ML: 5 INJECTION INTRAVENOUS at 10:02

## 2021-12-06 ASSESSMENT — PAIN SCALES - GENERAL
PAINLEVEL_OUTOF10: 0

## 2021-12-06 NOTE — PROGRESS NOTES
Inpatient Occupational Therapy  Evaluation and Treatment    Unit: ICU  Date:  12/6/2021  Patient Name:    Suzanna Arcos  Admitting diagnosis:  Acute respiratory disease due to COVID-19 virus [U07.1, J06.9]  Acute respiratory failure due to COVID-19 [facility]) [U07.1, J96.00]  Admit Date:  12/1/2021  Precautions/Restrictions/WB Status/ Lines/ Wounds/ Oxygen:  Fall risk, Bed/chair alarm, Lines -IV, Supplemental O2 (vapotherm 40 lpm, 80%) and Lee catheter, Telemetry, Continuous pulse oximetr  Pt placed on vapotherm on 12-2-21, Covid Isolation   Treatment Time: [id number]  Treatment Number: 1   Timed code treatment minutes 20 minutes   Total Treatment minutes:  30   minutes    Patient Goals for Therapy:  \" not stated \"      Discharge Recommendations: continue to assess  DME needs for discharge: continue to assess       Therapy recommendations for staff:   Assist of 1 for bed mobility     History of Present Illness:   Suzanna Arcos is an unvaccinated 80 y.o. female  With Vital signs of /65   Pulse 94   Temp 99.4 °F (37.4 °C) (Oral)   Resp 22   Ht 5' 4\" (1.626 m)   Wt 140 lb (63.5 kg)   SpO2 94%   BMI 24.03 kg/m²  who presents to the ED with a complaint of shortness of breath. Patient seen and evaluated in room 2. Patient was diagnosed with Covid 2 weeks ago with a positive Covid test.  Since then she has been doing fine until the this evening when she started to get more short of breath. She says she tested her pulse oximetry level and it was found to be in the seventies. But got progressively better. Upon arrival here in the emergency department her pulse oximetry reading is 94% on a nonrebreather mask. She denies any chest pain no leg swelling. Patient was treated with 1 DuoNeb on the way into the emergency department by EMS.   No other complaints, modifying factors or associated symptoms  12/3-was 100% Vapotherm during rounds.  She was down to 80% Vapotherm in the prone position but had to be made supine for PICC line insertion. Jonnie Holland is to reprone her and try to decrease FiO2.  She is on Precedex  12/4-patient has been manually self proning.  FiO2 down to 80%.  On 40 L/min.  Refused BiPAP overnight   12/5- used Bipap last night. On 80% vapo therm. Feeling about the same. 12/6  Currently on Vapotherm 100% 40 L   BiPAP overnight  12/7  Now on Vapotherm 70% FiO2 40 L/min  Used BiPAP overnight  She is  self proning\"  Home Health S4 Level Recommendation:  NA  AM-PAC Score:      Preadmission Environment    Pt. Lives Alone   Has 2 daughters that live close. Home environment:            one story home  Steps to enter first floor: 2 steps to enter and hand rail bilateral  Steps to second floor:         N/A  Bathroom: walk in shower, built in seat, comfort height commode  Equipment owned: RW, wc (manual) and SPC   Pt sleeps in flat non-adjustable bed.     Preadmission Status:  Pt. Able to drive: Yes  Pt Fully independent with ADLs: Yes  Pt. Required assistance from family for: Independent PTA  Pt. independent for transfers and gait and walked with No Device  History of falls          No     Pain   Yes  Location: stomach  Rating: mild /10  Pain Medicine Status: No request made     Cognition    A&O Person, Place and Situation ; says it's Dec 5, 2021. Able to follow 2 step commands     Subjective  Patient lying supine in bed with no family present.    Pt agreeable to this OT eval & tx.    .     Upper Extremity ROM:    WFL    Upper Extremity Strength:    WFL    Upper Extremity Sensation    WFL    Upper Extremity Proprioception:  WFL    Coordination and Tone  WFL    Balance  Functional Sitting Balance:  WFL  Functional Standing Balance:NT    Bed mobility:    Supine to sit:   SBA  Sit to supine:   SBA  Rolling:    Not Tested  Scooting in sitting:  Supervision  Scooting to head of bed:   Supervision the first time and min assist of two the second time after sitting edge of bed and Sp02 decreased when sitting Ayana Dubois OTR/L [id number]          If patient discharges from this facility prior to next visit, this note will serve as the Discharge Summary

## 2021-12-06 NOTE — PROGRESS NOTES
Internal Medicine ICU Progress Note      Events of Last 24 hours:     79-year-old female admitted to hospital with COVID-19. She has a past medical history of depression. She tested positive for Covid about 10 days ago. She apparently was taking ivermectin at home. She came to the emergency room and had a saturation of 70%. She was admitted to hospital and then transferred the ICU. She is presently on Vapotherm. Pulmonologist discussed with patient and family and apparently she wanted to be a DNI. RN came and told me later on that she was considering intubation. She is unvaccinated. 12/3-was 100% Vapotherm during rounds. She was down to 80% Vapotherm in the prone position but had to be made supine for PICC line insertion. Plan is to reprone her and try to decrease FiO2. She is on Precedex    -patient has been manually self proning. FiO2 down to 80%. On 40 L/min. Refused BiPAP overnight. - used Bipap last night. On 80% vapo therm. Feeling about the same.   Currently on Vapotherm 100% 40 L   BiPAP overnight        Invasive Lines: PIV      MV: N/AA    No results for input(s): PHART, OTR1KRT, PO2ART in the last 72 hours.     MV Settings:     / / /FiO2 : 100 %    IV:   dexmedetomidine HCl in NaCl 0.7 mcg/kg/hr (21 0627)    sodium chloride Stopped (21 1632)       Vitals:  Temp  Av.8 °F (37.1 °C)  Min: 98.2 °F (36.8 °C)  Max: 99.5 °F (37.5 °C)  Pulse  Av.3  Min: 53  Max: 85  BP  Min: 100/58  Max: 145/56  SpO2  Av.5 %  Min: 84 %  Max: 97 %  FiO2   Av.3 %  Min: 60 %  Max: 100 %  Patient Vitals for the past 4 hrs:   BP Temp Temp src Pulse Resp SpO2   21 1207 -- 98.2 °F (36.8 °C) Axillary -- 20 95 %   21 1200 119/63 -- -- 73 22 97 %   21 1100 (!) 105/50 -- -- 58 18 94 %   21 1000 (!) 121/57 -- -- 69 21 92 %   21 0941 -- -- -- -- 16 92 %   21 0900 (!) 104/44 -- -- 58 24 95 %       CVP:        Intake/Output Summary (Last 24 hours) at 12/6/2021 1247  Last data filed at 12/6/2021 0650  Gross per 24 hour   Intake 764.16 ml   Output 2600 ml   Net -1835.84 ml       EXAM:  General: Awake and alert. Ill-appearing. In moderate distress. Seen in supine position  Eyes: PERRL. No sclera icterus. No conjunctiva injected. ENT: No discharge. Pharynx clear. Neck: Trachea midline. Normal thyroid. Resp: + accessory muscle use. + crackles. No wheezing. No rhonchi. No dullness on percussion. CV: Regular rate. Regular rhythm. No mumur or rub. No edema. No JVD. Palpable pedal pulses. GI: Non-tender. Non-distended. No masses. No organmegaly. Normal bowel sounds. No hernia. Skin: Warm and dry. No nodule on exposed extremities. No rash on exposed extremities. Lymph: No cervical LAD. No supraclavicular LAD. M/S: No cyanosis. No joint deformity. No clubbing. Neuro: Awake. Follows commands. Positive pupils/gag/corneals. Normal pain response. Psych: Oriented to person, place, time. No anxiety or agitation.      Medications:  Scheduled Meds:   escitalopram  5 mg Oral Daily    QUEtiapine  12.5 mg Oral BID    mupirocin   Nasal BID    lidocaine 1 % injection  5 mL IntraDERmal Once    dexamethasone  6 mg IntraVENous Q24H    vitamin D  50,000 Units Oral Weekly    famotidine  20 mg Oral BID    sodium chloride flush  5-40 mL IntraVENous 2 times per day    enoxaparin  30 mg SubCUTAneous BID    levothyroxine  50 mcg Oral Daily    liothyronine  5 mcg Oral Daily    therapeutic multivitamin-minerals  1 tablet Oral Daily    influenza virus vaccine  0.5 mL IntraMUSCular Prior to discharge       PRN Meds:  melatonin, benzonatate, sodium chloride flush, sodium chloride, ondansetron **OR** ondansetron, polyethylene glycol, acetaminophen **OR** acetaminophen, guaiFENesin-dextromethorphan, albuterol sulfate HFA    Results:  CBC:   Recent Labs     12/04/21  0434 12/05/21  0525 12/06/21  0350   WBC 4.0 5.7 7.9   HGB 13.7 13.6 13.7   HCT 40.5 39.9 40.4 MCV 94.3 93.4 92.9    251 217     BMP:   Recent Labs     12/04/21  0434 12/05/21  0525 12/06/21  0350    139 139   K 3.8 3.6 3.6    100 99   CO2 27 31 30   BUN 21* 23* 16   CREATININE 0.6 <0.5* <0.5*     LIVER PROFILE:   Recent Labs     12/04/21  0434 12/05/21  0525 12/06/21  0350   AST 61* 53* 49*   ALT 50* 47* 36   BILITOT 0.3 0.3 0.4   ALKPHOS 175* 174* 157*   Results for Storm Villalba (MRN [id number]) as of 12/2/2021 11:46   Ref. Range 12/1/2021 04:30   Procalcitonin Latest Ref Range: 0.00 - 0.15 ng/mL 0.12       Cultures:  COVID positive outside lab    Films:    XR CHEST PORTABLE   Final Result   Moderate diffuse airspace disease compatible with multifocal pneumonia with   asymmetric involvement and consolidation left lower lobe. This could   represent COVID pneumonia with superimposed edema. IR PICC WO SQ PORT/PUMP > 5 YEARS   Final Result   Successful placement of PICC line. CT CHEST PULMONARY EMBOLISM W CONTRAST   Final Result   No evidence of pulmonary embolism. Extensive bilateral multifocal airspace disease compatible with multifocal   pneumonia, specifically COVID pneumonia. XR CHEST PORTABLE   Final Result   Multifocal peripherally located pulmonary opacities are seen, likely related   to COVID-19 pneumonia. Assessment:    Principal Problem:    Acute respiratory disease due to COVID-19 virus  Active Problems:    Depression    Hypokalemia    Elevated LFTs    Leukopenia    Pneumonia due to COVID-19 virus    Acquired hypothyroidism    Transaminitis    Electrolyte disorder    ARDS (adult respiratory distress syndrome) (HCC)  Resolved Problems:    * No resolved hospital problems. *       Plan:    #Acute respiratory failure with hypoxemia due to COVID-19. Patient admitted to hospital.  Pulmonary consultation. Supplemental oxygen. Continuous pulse ox and telemetry. On Vapotherm. May require intubation. CODE STATUS changed to full code. Continue with supervised self proning. Patient agreeable with intubation if required. Wants to postpone intubation as much as possible. Currently on Vapotherm 100% 40 L. Precedex when on BiPAP    #COVID-19 pneumonia. Work-up consistent with COVID-19 pneumonia. She is hypoxic. She meets criteria for treatment. Started on Decadron 12 mg a day. Presently on 6 mg of Decadron day #6. Monitor sugars closely. Status post Tocilizumab 12/3/2021. Post 2 dose of baricitinib    #Acquired hypothyroidism. Continue home medication. #Depression.   Stable    #Lovenox 30 mg subcu twice daily    #Transaminitis due to Cynthia Holcomb MD 12:47 PM 12/6/2021

## 2021-12-06 NOTE — PROGRESS NOTES
Shift assessment completed as documented on flowsheet. Pt very anxious at this time. Discussed importance of self proning and keeping bipap on at night to help allow pt to rest. Pt stated she understands she is just scared of dying. Encouraged pt with positive events that are taking place. Pt at this time pt placed herself in the prone position. Jesus patent. No concerns at this time. Daughter updated on phone. Explained that pt had a sleepless night and was currently resting. Requested she comes to visit at lunch time.  Daughter agreeable

## 2021-12-06 NOTE — PROGRESS NOTES
Occupational Therapy/ Physical Therapy     Attempted OT/PT eval and pts nurse stated to hold pt today and attempt again tomorrow.   Murlene Barthel OTR/L [id number] [facility], [address] [facility], [facility], [id number]

## 2021-12-06 NOTE — PROGRESS NOTES
Pulmonary & Critical Care Medicine ICU Progress Note    CC: COVID-19    Events of Last 24 hours:   Hypoxia overnight required BiPAP   Agitation despite precededx   Precedex 0.7 mcg/kg/hr   BiPAP overnight  95% FiO2   Vapotherm 40 LPM 80% FiO2           Vascular lines: IV: PICC 12/3    MV:       / / /FiO2 : 70 %  No results for input(s): PHART, [id number], PO2ART in the last 72 hours. IV:   dexmedetomidine HCl in NaCl 0.7 mcg/kg/hr (21 9595)    sodium chloride Stopped (21 1632)       Vitals:  Blood pressure 130/64, pulse 63, temperature 98.7 °F (37.1 °C), temperature source Oral, resp. rate 22, height 5' 4\" (1.626 m), weight 140 lb (63.5 kg), SpO2 97 %, not currently breastfeeding. On Vapotherm    Temp  Av.6 °F (37 °C)  Min: 97.8 °F (36.6 °C)  Max: 99.5 °F (37.5 °C)    Intake/Output Summary (Last 24 hours) at 2021 0723  Last data filed at 2021 0650  Gross per 24 hour   Intake 764.16 ml   Output 2600 ml   Net -1835.84 ml     PE:  General: ill appearing    Eyes: PERRL. No sclera icterus. No conjunctival injection. ENT: No discharge. Pharynx clear. Neck: Trachea midline. Normal thyroid. Resp: + accessory muscle use.  + crackles. No wheezing. No rhonchi. No dullness on percussion. CV: Regular rate. Regular rhythm. No mumur or rub. No edema. GI: Non-tender. Non-distended. No masses. No organomegaly. Normal bowel sounds. No hernia. Skin: Warm and dry. No nodule on exposed extremities. No rash on exposed extremities. Lymph: No cervical LAD. No supraclavicular LAD. M/S: No cyanosis. No joint deformity. No clubbing. Neuro: AAOx3. Followed commands.    Psych: No agitation, no anxiety, affect is full       Scheduled Meds:   mupirocin   Nasal BID    lidocaine 1 % injection  5 mL IntraDERmal Once    dexamethasone  6 mg IntraVENous Q24H    vitamin D  50,000 Units Oral Weekly    famotidine  20 mg Oral BID    sodium chloride flush  5-40 mL IntraVENous 2 times per day    enoxaparin  30 mg SubCUTAneous BID    levothyroxine  50 mcg Oral Daily    liothyronine  5 mcg Oral Daily    therapeutic multivitamin-minerals  1 tablet Oral Daily    influenza virus vaccine  0.5 mL IntraMUSCular Prior to discharge       Data:  CBC:   Recent Labs     12/04/21 0434 12/05/21  0525 12/06/21  0350   WBC 4.0 5.7 7.9   HGB 13.7 13.6 13.7   HCT 40.5 39.9 40.4   MCV 94.3 93.4 92.9    251 217     BMP:   Recent Labs     12/04/21  0434 12/05/21  0525 12/06/21  0350    139 139   K 3.8 3.6 3.6    100 99   CO2 27 31 30   BUN 21* 23* 16   CREATININE 0.6 <0.5* <0.5*     LIVER PROFILE:   Recent Labs     12/04/21 0434 12/05/21  0525 12/06/21  0350   AST 61* 53* 49*   ALT 50* 47* 36   BILITOT 0.3 0.3 0.4   ALKPHOS 175* 174* 157*       Microbiology:  12/1 [facility] NGTD    Imaging:  CTPA 12/6 imaging reviewed by me and showed  Diffuse bilateral airspace disease-worsening          ASSESSMENT:  · Acute hypoxemic respiratory failure -severe progressive life-threatening  · COVID-19 viral pneumonia  · Transminitis   · Electrolytes disorder   · Anxiety/agitation   · Not vaccinated for COVID-19        PLAN:  Vapotherm for life-threatening acute hypoxemic respiratory failure and titrate to maintain SaO2 >92%  Bilevel non-invasive positive pressure ventilation per my orders- change 12/8   Precedex only with BIPAP if needed   Procalcitonin  Add Levaquin  Supervised self proning   Lasix 20 IV x1  KCL  Off Abx- low procalcitonin   Continuous pulse oximetry  Droplet plus isolation (surgical mask, eye protection, gown, glove)  Dexamethasone 6 mg IV D#6-monitor blood glucose closely  Tocilizumab 12/3/21.  Post 2 doses of Baricitinib   Seroquel 12.5 PO Q12 hrs   Resume Lexapro   DVT prophylaxis: Lovenox BID   MRSA prophylaxis: Bactroban  Full code         Total critical care time caring for this patient with life threatening, unstable organ failure, including direct patient contact, management of life support systems, review of data including imaging and labs, discussions with other team members and physicians is 31 minutes, excluding procedures.

## 2021-12-06 NOTE — PROGRESS NOTES
12/05/21 1110   NIV Type   Skin Protection for O2 Device Yes   Location Nose   Equipment Changed Vent Circuit  (heated)   NIV Started/Stopped On   Equipment Type V60   Mode Bilevel   Mask Type Full face mask   Mask Size Medium   Settings/Measurements   IPAP 12 cmH20   CPAP/EPAP 8 cmH2O   Rate Ordered 16   Resp 23   Insp Rise Time (%) 2 %   FiO2  70 %   I Time/ I Time % 1 s   Vt Exhaled 688 mL   Minute Volume 19.5 Liters   Mask Leak (lpm) 0 lpm   Comfort Level Good   Using Accessory Muscles No   SpO2 91

## 2021-12-06 NOTE — PROGRESS NOTES
12/06/21 0228   NIV Type   NIV Started/Stopped On   Equipment Type V60   Mode Bilevel   Mask Type Full face mask   Mask Size Medium   Settings/Measurements   IPAP 12 cmH20   CPAP/EPAP 8 cmH2O   Rate Ordered 16   Resp 24   Insp Rise Time (%) 2 %   FiO2  70 %   I Time/ I Time % 1 s   Vt Exhaled 438 mL   Minute Volume 13.6 Liters   Mask Leak (lpm) 2 lpm   Comfort Level Good   Using Accessory Muscles No   SpO2 92

## 2021-12-06 NOTE — PROGRESS NOTES
AM rounds with dr Vishnu Beckwith. Update given on pt restless night. New orders received at this time.

## 2021-12-07 LAB
A/G RATIO: 1.2 (ref 1.1–2.2)
ALBUMIN SERPL-MCNC: 3 G/DL (ref 3.4–5)
ALP BLD-CCNC: 140 U/L (ref 40–129)
ALT SERPL-CCNC: 27 U/L (ref 10–40)
ANION GAP SERPL CALCULATED.3IONS-SCNC: 10 MMOL/L (ref 3–16)
AST SERPL-CCNC: 38 U/L (ref 15–37)
BASOPHILS ABSOLUTE: 0 K/UL (ref 0–0.2)
BASOPHILS RELATIVE PERCENT: 0.1 %
BILIRUB SERPL-MCNC: 0.4 MG/DL (ref 0–1)
BUN BLDV-MCNC: 17 MG/DL (ref 7–20)
CALCIUM SERPL-MCNC: 8.3 MG/DL (ref 8.3–10.6)
CHLORIDE BLD-SCNC: 98 MMOL/L (ref 99–110)
CO2: 29 MMOL/L (ref 21–32)
CREAT SERPL-MCNC: 0.6 MG/DL (ref 0.6–1.2)
EOSINOPHILS ABSOLUTE: 0.1 K/UL (ref 0–0.6)
EOSINOPHILS RELATIVE PERCENT: 1 %
GFR AFRICAN AMERICAN: >60
GFR NON-AFRICAN AMERICAN: >60
GLUCOSE BLD-MCNC: 105 MG/DL (ref 70–99)
GLUCOSE BLD-MCNC: 129 MG/DL (ref 70–99)
GLUCOSE BLD-MCNC: 148 MG/DL (ref 70–99)
HCT VFR BLD CALC: 40 % (ref 36–48)
HEMOGLOBIN: 13.4 G/DL (ref 12–16)
LYMPHOCYTES ABSOLUTE: 1 K/UL (ref 1–5.1)
LYMPHOCYTES RELATIVE PERCENT: 11.8 %
MCH RBC QN AUTO: 31.1 PG (ref 26–34)
MCHC RBC AUTO-ENTMCNC: 33.6 G/DL (ref 31–36)
MCV RBC AUTO: 92.7 FL (ref 80–100)
MONOCYTES ABSOLUTE: 0.5 K/UL (ref 0–1.3)
MONOCYTES RELATIVE PERCENT: 6.1 %
NEUTROPHILS ABSOLUTE: 7 K/UL (ref 1.7–7.7)
NEUTROPHILS RELATIVE PERCENT: 81 %
PDW BLD-RTO: 13.2 % (ref 12.4–15.4)
PERFORMED ON: ABNORMAL
PERFORMED ON: ABNORMAL
PLATELET # BLD: 259 K/UL (ref 135–450)
PMV BLD AUTO: 8.5 FL (ref 5–10.5)
POTASSIUM REFLEX MAGNESIUM: 3.6 MMOL/L (ref 3.5–5.1)
RBC # BLD: 4.32 M/UL (ref 4–5.2)
SODIUM BLD-SCNC: 137 MMOL/L (ref 136–145)
TOTAL PROTEIN: 5.5 G/DL (ref 6.4–8.2)
WBC # BLD: 8.7 K/UL (ref 4–11)

## 2021-12-07 PROCEDURE — 6370000000 HC RX 637 (ALT 250 FOR IP): Performed by: INTERNAL MEDICINE

## 2021-12-07 PROCEDURE — 6360000002 HC RX W HCPCS: Performed by: INTERNAL MEDICINE

## 2021-12-07 PROCEDURE — 6370000000 HC RX 637 (ALT 250 FOR IP): Performed by: NURSE PRACTITIONER

## 2021-12-07 PROCEDURE — 2000000000 HC ICU R&B

## 2021-12-07 PROCEDURE — 85025 COMPLETE CBC W/AUTO DIFF WBC: CPT

## 2021-12-07 PROCEDURE — 97530 THERAPEUTIC ACTIVITIES: CPT

## 2021-12-07 PROCEDURE — 97166 OT EVAL MOD COMPLEX 45 MIN: CPT

## 2021-12-07 PROCEDURE — 94660 CPAP INITIATION&MGMT: CPT

## 2021-12-07 PROCEDURE — 2700000000 HC OXYGEN THERAPY PER DAY

## 2021-12-07 PROCEDURE — 94761 N-INVAS EAR/PLS OXIMETRY MLT: CPT

## 2021-12-07 PROCEDURE — 97162 PT EVAL MOD COMPLEX 30 MIN: CPT

## 2021-12-07 PROCEDURE — 2580000003 HC RX 258: Performed by: INTERNAL MEDICINE

## 2021-12-07 PROCEDURE — 80053 COMPREHEN METABOLIC PANEL: CPT

## 2021-12-07 PROCEDURE — 99233 SBSQ HOSP IP/OBS HIGH 50: CPT | Performed by: INTERNAL MEDICINE

## 2021-12-07 PROCEDURE — 99291 CRITICAL CARE FIRST HOUR: CPT | Performed by: INTERNAL MEDICINE

## 2021-12-07 RX ORDER — FUROSEMIDE 10 MG/ML
20 INJECTION INTRAMUSCULAR; INTRAVENOUS ONCE
Status: COMPLETED | OUTPATIENT
Start: 2021-12-07 | End: 2021-12-07

## 2021-12-07 RX ORDER — POTASSIUM CHLORIDE 750 MG/1
40 TABLET, EXTENDED RELEASE ORAL ONCE
Status: COMPLETED | OUTPATIENT
Start: 2021-12-07 | End: 2021-12-07

## 2021-12-07 RX ADMIN — DEXAMETHASONE SODIUM PHOSPHATE 6 MG: 10 INJECTION INTRAMUSCULAR; INTRAVENOUS at 08:06

## 2021-12-07 RX ADMIN — LEVOFLOXACIN 500 MG: 500 INJECTION, SOLUTION INTRAVENOUS at 15:05

## 2021-12-07 RX ADMIN — POTASSIUM CHLORIDE 40 MEQ: 750 TABLET, EXTENDED RELEASE ORAL at 12:31

## 2021-12-07 RX ADMIN — ENOXAPARIN SODIUM 30 MG: 100 INJECTION SUBCUTANEOUS at 20:14

## 2021-12-07 RX ADMIN — MUPIROCIN: 20 OINTMENT TOPICAL at 08:06

## 2021-12-07 RX ADMIN — BISMUTH SUBSALICYLATE 30 ML: 525 LIQUID ORAL at 17:00

## 2021-12-07 RX ADMIN — FAMOTIDINE 20 MG: 20 TABLET ORAL at 08:06

## 2021-12-07 RX ADMIN — FUROSEMIDE 20 MG: 10 INJECTION, SOLUTION INTRAVENOUS at 12:31

## 2021-12-07 RX ADMIN — SODIUM CHLORIDE, PRESERVATIVE FREE 10 ML: 5 INJECTION INTRAVENOUS at 08:06

## 2021-12-07 RX ADMIN — QUETIAPINE FUMARATE 12.5 MG: 25 TABLET ORAL at 20:14

## 2021-12-07 RX ADMIN — FAMOTIDINE 20 MG: 20 TABLET ORAL at 20:14

## 2021-12-07 RX ADMIN — LEVOTHYROXINE SODIUM 50 MCG: 0.03 TABLET ORAL at 08:04

## 2021-12-07 RX ADMIN — MUPIROCIN: 20 OINTMENT TOPICAL at 20:16

## 2021-12-07 RX ADMIN — LIOTHYRONINE SODIUM 5 MCG: 5 TABLET ORAL at 15:05

## 2021-12-07 RX ADMIN — QUETIAPINE FUMARATE 12.5 MG: 25 TABLET ORAL at 08:06

## 2021-12-07 RX ADMIN — SODIUM CHLORIDE, PRESERVATIVE FREE 10 ML: 5 INJECTION INTRAVENOUS at 20:17

## 2021-12-07 RX ADMIN — POLYETHYLENE GLYCOL (3350) 17 G: 17 POWDER, FOR SOLUTION ORAL at 17:02

## 2021-12-07 RX ADMIN — MULTIPLE VITAMINS W/ MINERALS TAB 1 TABLET: TAB at 08:04

## 2021-12-07 RX ADMIN — ESCITALOPRAM OXALATE 5 MG: 10 TABLET ORAL at 08:04

## 2021-12-07 RX ADMIN — ENOXAPARIN SODIUM 30 MG: 100 INJECTION SUBCUTANEOUS at 08:06

## 2021-12-07 RX ADMIN — PSYLLIUM HUSK 1 PACKET: 3.4 POWDER ORAL at 08:04

## 2021-12-07 NOTE — PLAN OF CARE
Continuing to monitor pain and discomfort. Monitoring pain level on scale of 0-10. Non- pharmacological measures encouraged to reduce discomfort/pain. PRN pain meds administeration continues when/if applicable as ordered by physician. Monitoring patient skin integrity for skin breakdown, turning and repositioning q2h per protocol. Austin Pollack

## 2021-12-07 NOTE — PROGRESS NOTES
12/06/21 7163   NIV Type   NIV Started/Stopped On   Equipment Type v60   Mode Bilevel   Mask Type Full face mask   Mask Size Medium   Settings/Measurements   IPAP 12 cmH20   CPAP/EPAP 8 cmH2O   Rate Ordered 16   Resp 21   O2 Flow Rate (L/min) 40 L/min   FiO2  96 %   I Time/ I Time % 1 s   Vt Exhaled 512 mL   Minute Volume 12.4 Liters   Mask Leak (lpm) 22 lpm   Comfort Level Good   Using Accessory Muscles No   SpO2 96

## 2021-12-07 NOTE — PROGRESS NOTES
Internal Medicine ICU Progress Note      Events of Last 24 hours:     71-year-old female admitted to hospital with COVID-19. She has a past medical history of depression. She tested positive for Covid about 10 days ago. She apparently was taking ivermectin at home. She came to the emergency room and had a saturation of 70%. She was admitted to hospital and then transferred the ICU. She is presently on Vapotherm. Pulmonologist discussed with patient and family and apparently she wanted to be a DNI. RN came and told me later on that she was considering intubation. She is unvaccinated. 12/3-was 100% Vapotherm during rounds. She was down to 80% Vapotherm in the prone position but had to be made supine for PICC line insertion. Plan is to reprone her and try to decrease FiO2. She is on Precedex    -patient has been manually self proning. FiO2 down to 80%. On 40 L/min. Refused BiPAP overnight. - used Bipap last night. On 80% vapo therm. Feeling about the same.   Currently on Vapotherm 100% 40 L   BiPAP overnight        Now on Vapotherm 70% FiO2 40 L/min  Used BiPAP overnight  She is  self proning      Invasive Lines: PIV      MV: N/AA    No results for input(s): PHART, YCC1YZT, PO2ART in the last 72 hours.     MV Settings:     / / /FiO2 : 70 %    IV:   dexmedetomidine HCl in NaCl 0.1 mcg/kg/hr (21 0850)    sodium chloride Stopped (21 1632)       Vitals:  Temp  Av.2 °F (36.8 °C)  Min: 98 °F (36.7 °C)  Max: 98.3 °F (36.8 °C)  Pulse  Av.2  Min: 58  Max: 104  BP  Min: 93/61  Max: 128/56  SpO2  Av.4 %  Min: 79 %  Max: 99 %  FiO2   Av.7 %  Min: 70 %  Max: 100 %  Patient Vitals for the past 4 hrs:   BP Temp Temp src Pulse Resp SpO2 Height   21 1314 -- -- -- -- -- -- 5' 4\" (1.626 m)   12/07/21 1200 122/62 98.3 °F (36.8 °C) Oral 94 22 90 % --   21 1100 105/89 -- -- 80 20 93 % --   21 1000 (!) 123/54 -- -- 94 20 91 % --       CVP: Intake/Output Summary (Last 24 hours) at 12/7/2021 1341  Last data filed at 12/7/2021 0600  Gross per 24 hour   Intake 282.51 ml   Output 2210 ml   Net -1927.49 ml       EXAM:  General: Awake and alert. Ill-appearing. In moderate distress. Seen in supine position  Eyes: PERRL. No sclera icterus. No conjunctiva injected. ENT: No discharge. Pharynx clear. Neck: Trachea midline. Normal thyroid. Resp: + accessory muscle use. + crackles. No wheezing. No rhonchi. No dullness on percussion. CV: Regular rate. Regular rhythm. No mumur or rub. No edema. No JVD. Palpable pedal pulses. GI: Non-tender. Non-distended. No masses. No organmegaly. Normal bowel sounds. No hernia. Skin: Warm and dry. No nodule on exposed extremities. No rash on exposed extremities. Lymph: No cervical LAD. No supraclavicular LAD. M/S: No cyanosis. No joint deformity. No clubbing. Neuro: Awake. Follows commands. Positive pupils/gag/corneals. Normal pain response. Psych: Oriented to person, place, time. No anxiety or agitation.      Medications:  Scheduled Meds:   escitalopram  5 mg Oral Daily    QUEtiapine  12.5 mg Oral BID    psyllium  1 packet Oral Daily    levofloxacin  500 mg IntraVENous Q24H    mupirocin   Nasal BID    lidocaine 1 % injection  5 mL IntraDERmal Once    dexamethasone  6 mg IntraVENous Q24H    vitamin D  50,000 Units Oral Weekly    famotidine  20 mg Oral BID    sodium chloride flush  5-40 mL IntraVENous 2 times per day    enoxaparin  30 mg SubCUTAneous BID    levothyroxine  50 mcg Oral Daily    liothyronine  5 mcg Oral Daily    therapeutic multivitamin-minerals  1 tablet Oral Daily    influenza virus vaccine  0.5 mL IntraMUSCular Prior to discharge       PRN Meds:  bismuth subsalicylate, melatonin, benzonatate, sodium chloride flush, sodium chloride, ondansetron **OR** ondansetron, polyethylene glycol, acetaminophen **OR** acetaminophen, guaiFENesin-dextromethorphan, albuterol sulfate HFA    Results:  CBC:   Recent Labs     12/05/21  0525 12/06/21  0350 12/07/21  0425   WBC 5.7 7.9 8.7   HGB 13.6 13.7 13.4   HCT 39.9 40.4 40.0   MCV 93.4 92.9 92.7    217 259     BMP:   Recent Labs     12/05/21  0525 12/06/21  0350 12/07/21  0425    139 137   K 3.6 3.6 3.6    99 98*   CO2 31 30 29   BUN 23* 16 17   CREATININE <0.5* <0.5* 0.6     LIVER PROFILE:   Recent Labs     12/05/21  0525 12/06/21  0350 12/07/21  0425   AST 53* 49* 38*   ALT 47* 36 27   BILITOT 0.3 0.4 0.4   ALKPHOS 174* 157* 140*   Results for Bautista Schneider (MRN [id number]) as of 12/2/2021 11:46   Ref. Range 12/1/2021 04:30   Procalcitonin Latest Ref Range: 0.00 - 0.15 ng/mL 0.12       Cultures:  COVID positive outside lab    Films:    XR CHEST PORTABLE   Final Result   Moderate diffuse airspace disease compatible with multifocal pneumonia with   asymmetric involvement and consolidation left lower lobe. This could   represent COVID pneumonia with superimposed edema. IR PICC WO SQ PORT/PUMP > 5 YEARS   Final Result   Successful placement of PICC line. CT CHEST PULMONARY EMBOLISM W CONTRAST   Final Result   No evidence of pulmonary embolism. Extensive bilateral multifocal airspace disease compatible with multifocal   pneumonia, specifically COVID pneumonia. XR CHEST PORTABLE   Final Result   Multifocal peripherally located pulmonary opacities are seen, likely related   to COVID-19 pneumonia. Assessment:    Principal Problem:    Acute respiratory disease due to COVID-19 virus  Active Problems:    Depression    Hypokalemia    Elevated LFTs    Leukopenia    Acute respiratory failure with hypoxia (HCC)    Pneumonia due to COVID-19 virus    Acquired hypothyroidism    Transaminitis    Electrolyte disorder    ARDS (adult respiratory distress syndrome) (HCC)    Agitation  Resolved Problems:    * No resolved hospital problems.  *       Plan:    #Acute respiratory failure with hypoxemia due to COVID-19. Patient admitted to hospital.  Pulmonary consultation. Supplemental oxygen. Continuous pulse ox and telemetry. On Vapotherm. May require intubation. CODE STATUS changed to full code. Continue with supervised self proning. Patient agreeable with intubation if required. Wants to postpone intubation as much as possible. Currently on Vapotherm 100% 40 L--> 70%  40 L Precedex when on BiPAP. Self proning. #COVID-19 pneumonia. Work-up consistent with COVID-19 pneumonia. She is hypoxic. She meets criteria for treatment. Started on Decadron 12 mg a day. Presently on 6 mg of Decadron day #7. Monitor sugars closely. Status post Tocilizumab 12/3/2021. Post 2 doses of baricitinib    #Acquired hypothyroidism. Continue home medication. #Depression.   Stable    #Lovenox 30 mg subcu twice daily    #Transaminitis due to Swapna Carnes MD 1:41 PM 12/7/2021

## 2021-12-07 NOTE — PLAN OF CARE
Nutrition Problem #1: Inadequate oral intake  Intervention: Food and/or Nutrient Delivery: Continue Current Diet, Continue Oral Nutrition Supplement  Nutritional Goals: patient will consume 50% or greater of meals on ADULT DIET;  Regular diet order x 3 meals per day and she will consume 50% or greater of Ensure high-protein with meals

## 2021-12-07 NOTE — PROGRESS NOTES
08:00   Pt awake a&o  x4  , VSS assessment as charted.  Pt taken off bipap this morning by RT and placed on vapotherm   10:10 Critical care rounds completed @ bedside w/ Dr. Flaca Younger, pt status and all current labs reviewed w/ MD SUNITA in to see pt Daughter Jacob Valerio Present for rounds and updated by MD  12:30 Pt remains awake a&o x4,  Reassessment unchanged and as charted  16:20 Reassessment unchanged and as charted , pt remains on Vapotherm and @ 80 % now satting 93-95 %, pt remains awake a&o x4  19:00 Handoff report given to night nurse Nurse , pt awake a&o x4, pt stable @ handoff on vapo therm

## 2021-12-07 NOTE — PROGRESS NOTES
Pulmonary & Critical Care Medicine ICU Progress Note    CC: COVID-19    Events of Last 24 hours:   BiPAP overnight  70% FiO2   Vapotherm 40 LPM 95% FiO2   Compliant with self proning   Slept good with Seroquel   Precedex off         Vascular lines: IV: PICC 12/3    MV:       / / /FiO2 : 70 %  No results for input(s): PHART, [id number], PO2ART in the last 72 hours. IV:   dexmedetomidine HCl in NaCl 0.2 mcg/kg/hr (21 0428)    sodium chloride Stopped (21 1632)       Vitals:  Blood pressure (!) 116/54, pulse 72, temperature 98.2 °F (36.8 °C), temperature source Oral, resp. rate 21, height 5' 4\" (1.626 m), weight 140 lb (63.5 kg), SpO2 95 %, not currently breastfeeding. On Vapotherm    Temp  Av.1 °F (36.7 °C)  Min: 98 °F (36.7 °C)  Max: 98.2 °F (36.8 °C)    Intake/Output Summary (Last 24 hours) at 2021 7070  Last data filed at 2021 0600  Gross per 24 hour   Intake 282.51 ml   Output 2210 ml   Net -1927.49 ml     PE:  General: ill appearing    Eyes: PERRL. No sclera icterus. No conjunctival injection. ENT: No discharge. Pharynx clear. Neck: Trachea midline. Normal thyroid. Resp: Mild accessory muscle use. Few crackles. No wheezing. No rhonchi. No dullness on percussion. CV: Regular rate. Regular rhythm. No mumur or rub. No edema. GI: Non-tender. Non-distended. No masses. No organomegaly. Normal bowel sounds. No hernia. Skin: Warm and dry. No nodule on exposed extremities. No rash on exposed extremities. Lymph: No cervical LAD. No supraclavicular LAD. M/S: No cyanosis. No joint deformity. No clubbing. Neuro: AAOx3. Followed commands.    Psych: No agitation, no anxiety, affect is full       Scheduled Meds:   escitalopram  5 mg Oral Daily    QUEtiapine  12.5 mg Oral BID    psyllium  1 packet Oral Daily    levofloxacin  500 mg IntraVENous Q24H    mupirocin   Nasal BID    lidocaine 1 % injection  5 mL IntraDERmal Once    dexamethasone  6 mg IntraVENous Q24H    vitamin D multiple good questions were answered        Total critical care time caring for this patient with life threatening, unstable organ failure, including direct patient contact, management of life support systems, review of data including imaging and labs, discussions with other team members and physicians is 31 minutes, excluding procedures.

## 2021-12-07 NOTE — PROGRESS NOTES
Inpatient Physical Therapy Evaluation and Treatment    Unit: ICU  Date:  12/7/2021  Patient Name:    Alea Okeefe  Admitting diagnosis:  Acute respiratory disease due to COVID-19 virus [U07.1, J06.9]  Acute respiratory failure due to COVID-19 [facility]) [U07.1, J96.00]  Admit Date:  12/1/2021  Precautions/Restrictions/WB Status/ Lines/ Wounds/ Oxygen: Fall risk, Bed/chair alarm, Lines -IV, Supplemental O2 (vapotherm 40 lpm, 80%) and Lee catheter, Telemetry, Continuous pulse oximetry and Isolation Precautions: Droplet Plus - COVID    Treatment Time:  13:50-14:18  Treatment Number:  1   Timed Code Treatment Minutes: 18 minutes  Total Treatment Minutes:  28  minutes    Patient Goals for Therapy: pt did not state          Discharge Recommendations: CTA pending progress and increased participation   DME needs for discharge: CTA       Therapy recommendation for EMS Transport: requires transport by cot due to need for lift equipment to safely transfer    Therapy recommendations for staff:   Stand by assist with use of No AD for all bed mobility and supine<>sit transfers. Encourage regular AROM UE/LE. History of Present Illness: Per Dr. Braxton Purchase progress note 157: \"80year-old female admitted to hospital with COVID-19. She has a past medical history of depression. She tested positive for Covid about 10 days ago. She apparently was taking ivermectin at home. She came to the emergency room and had a saturation of 70%. She was admitted to hospital and then transferred the ICU. She is presently on Vapotherm. Pulmonologist discussed with patient and family and apparently she wanted to be a DNI. RN came and told me later on that she was considering intubation. She is unvaccinated. 12/3-was 100% Vapotherm during rounds. She was down to 80% Vapotherm in the prone position but had to be made supine for PICC line insertion. Plan is to reprone her and try to decrease FiO2.   She is on Precedex  12/4-patient has been manually self proning. FiO2 down to 80%. On 40 L/min. Refused BiPAP overnight   12/5- used Bipap last night. On 80% vapo therm. Feeling about the same. 12/6  Currently on Vapotherm 100% 40 L   BiPAP overnight  12/7  Now on Vapotherm 70% FiO2 40 L/min  Used BiPAP overnight  She is  self proning\"    Home Health S4 Level Recommendation:  NA  AM-PAC Mobility Score    AM-PAC Inpatient Mobility Raw Score : 12       Preadmission Environment    Pt. Lives Alone. Has 2 daughters that live close. Home environment:  one story home  Steps to enter first floor: 2 steps to enter and hand rail bilateral  Steps to second floor: N/A  Bathroom: walk in shower, built in seat, comfort height commode  Equipment owned: RW, wc (manual) and SPC   Pt sleeps in flat non-adjustable bed. Preadmission Status:  Pt. Able to drive: Yes  Pt Fully independent with ADLs: Yes  Pt. Required assistance from family for: Independent PTA  Pt. independent for transfers and gait and walked with No Device  History of falls No    Pain   Yes  Location: stomach  Rating: mild /10  Pain Medicine Status: No request made    Cognition    A&O Person, Place and Situation ; says it's Dec 5, 2021. Able to follow 1 step commands    Subjective  Patient lying supine in bed with no family present. Pt agreeable to this PT eval & tx. Upper Extremity ROM/Strength  Please see OT evaluation. Lower Extremity ROM / Strength   AROM WFL: Yes    BLE strength WFL, but not formally assessed with MMT. Assessed in supine: bilat ankle PF/DF, hip/knee extension via leg press, hip abduction. All ~4+/5.     Lower Extremity Sensation    NT    Lower Extremity Proprioception:   WFL    Coordination and Tone  WFL    Balance  Sitting:  Good ; Supervision  Comments: Sat EOB ~2 minutes before desaturating/fatiguing     Standing: Not tested; N/A  Comments: NA    Bed Mobility   Supine to Sit:    SBA with HOB ~45*  Sit to Supine:   SBA with HOB ~45*  Rolling:   Not Tested  Scooting in rail unilateral and LRAD (least restrictive assistive device)    Rehabilitation Potential: Good  Strengths for achieving goals include:   Pt motivated, PLOF, Family Support and Pt cooperative   Barriers to achieving goals include:    No Barriers    Plan    To be seen 2-3 x / week  while in acute care setting for therapeutic exercises, bed mobility, transfers, progressive gait training, balance training, and family/patient education. Signature: Joe Serum, PT, DPT    If patient discharges from this facility prior to next visit, this note will serve as the Discharge Summary.

## 2021-12-07 NOTE — PROGRESS NOTES
Comprehensive Nutrition Assessment    Type and Reason for Visit:  Initial (LOS x 6 days)    Nutrition Recommendations/Plan:   1. Continue ADULT DIET; Regular diet order - please document meal intake % in flow sheets for each meal.   2. Continue Ensure high-protein with meals. 3. Monitor appetite, meal intake, acceptance/intake of ONS, and ability to consume po nutrition with respiratory dysfunction. 4. Please obtain an actual, current weight for this patient - only a stated weight was obtained on 12/1/21.   5. Monitor nutrition-related labs, bowel function, and weight trends. Nutrition Assessment:  patient is nutritionally compromised AEB decreased appetite/po intake r/t COVID-19 virus and respiratory dysfunction and she remains at risk for further compromise d/t ongoing respiratory dysfunction r/t COVID-19 virus, altered nutrition-related labs, and increased nutrition needs r/t COVID-19 virus; will continue ADULT DIET; Regular diet order and Esnure high-protein with meals    Malnutrition Assessment:  Malnutrition Status: At risk for malnutrition    Context:  Acute Illness     Findings of the 6 clinical characteristics of malnutrition:  Energy Intake:  7 - 50% or less of estimated energy requirements for 5 or more days  Weight Loss:  Unable to assess (only a stated weight was obtained on 12/1/21)     Body Fat Loss:  Unable to assess (COVID-19 +)     Muscle Mass Loss:  Unable to assess (COVID-19 +)    Fluid Accumulation:  No significant fluid accumulation     Strength:  Not Performed    Estimated Daily Nutrient Needs:  Energy (kcal):  1472 - 1600 kcals based on 23-25 kcals/kg/CBW; Weight Used for Energy Requirements:  Current     Protein (g):  77 - 90 g protein based on 1.2-1.4 g/kg/CBW;  Weight Used for Protein Requirements:  Current        Fluid (ml/day):  1472 - 1600 ml; Method Used for Fluid Requirements:  1 ml/kcal      Nutrition Related Findings:  patient is A & O x 4; minimal po intake documented in flow sheets and patient is still struggling with respiratory dysfunction; abdomen is flat, soft, and bowel sounds are active; last BM was on 12/5/21; Cl is low and AST + alk phos are elevated; patient has lexapro, lovenox, decadron, pepcid, levaquin, synthroid, bactroban, metamucil, seroquel, MVI, vitamin D, and precedex in NS ordered at this time      Wounds:  None       Current Nutrition Therapies:    ADULT DIET; Regular  ADULT ORAL NUTRITION SUPPLEMENT; Breakfast, Lunch, Dinner; Standard High Calorie/High Protein Oral Supplement    Anthropometric Measures:  · Height: 5' 4\" (162.6 cm)  · Current Body Weight: 140 lb (63.5 kg) (obtained on 12/1/21; stated weight)   · Admission Body Weight: 140 lb (63.5 kg) (obtained on 12/1/21; stated weight)    · Usual Body Weight: 146 lb (66.2 kg) (obtained on 6/18/21; unknown whether actual weight or stated/estimated weight)     · Ideal Body Weight: 120 lbs; % Ideal Body Weight 116.7 %   · BMI: 24   · BMI Categories: Normal Weight (BMI 22.0 to 24.9) age over 72       Nutrition Diagnosis:   · Inadequate oral intake related to inadequate protein-energy intake, impaired respiratory function, increase demand for energy/nutrients as evidenced by lab values, poor intake prior to admission, diarrhea      Nutrition Interventions:   Food and/or Nutrient Delivery:  Continue Current Diet, Continue Oral Nutrition Supplement  Nutrition Education/Counseling:  No recommendation at this time   Coordination of Nutrition Care:  Continue to monitor while inpatient, Interdisciplinary Rounds    Goals:  patient will consume 50% or greater of meals on ADULT DIET;  Regular diet order x 3 meals per day and she will consume 50% or greater of Ensure high-protein with meals       Nutrition Monitoring and Evaluation:   Behavioral-Environmental Outcomes:  None Identified   Food/Nutrient Intake Outcomes:  Food and Nutrient Intake, Supplement Intake  Physical Signs/Symptoms Outcomes:  Biochemical Data, Diarrhea, GI Status, Hemodynamic Status, Weight, Skin     Discharge Planning:     Too soon to determine     Electronically signed by Sara Demarco RD, LD on 12/7/21 at 2:16 PM EST    Contact: [phone]

## 2021-12-08 LAB
A/G RATIO: 1.2 (ref 1.1–2.2)
ALBUMIN SERPL-MCNC: 3.2 G/DL (ref 3.4–5)
ALP BLD-CCNC: 130 U/L (ref 40–129)
ALT SERPL-CCNC: 31 U/L (ref 10–40)
ANION GAP SERPL CALCULATED.3IONS-SCNC: 7 MMOL/L (ref 3–16)
AST SERPL-CCNC: 46 U/L (ref 15–37)
BASOPHILS ABSOLUTE: 0 K/UL (ref 0–0.2)
BASOPHILS RELATIVE PERCENT: 0.3 %
BILIRUB SERPL-MCNC: 0.5 MG/DL (ref 0–1)
BUN BLDV-MCNC: 25 MG/DL (ref 7–20)
CALCIUM SERPL-MCNC: 8.6 MG/DL (ref 8.3–10.6)
CHLORIDE BLD-SCNC: 94 MMOL/L (ref 99–110)
CO2: 30 MMOL/L (ref 21–32)
CREAT SERPL-MCNC: 0.6 MG/DL (ref 0.6–1.2)
EOSINOPHILS ABSOLUTE: 0.2 K/UL (ref 0–0.6)
EOSINOPHILS RELATIVE PERCENT: 1.8 %
GFR AFRICAN AMERICAN: >60
GFR NON-AFRICAN AMERICAN: >60
GLUCOSE BLD-MCNC: 127 MG/DL (ref 70–99)
GLUCOSE BLD-MCNC: 136 MG/DL (ref 70–99)
GLUCOSE BLD-MCNC: 153 MG/DL (ref 70–99)
GLUCOSE BLD-MCNC: 93 MG/DL (ref 70–99)
GLUCOSE BLD-MCNC: 96 MG/DL (ref 70–99)
HCT VFR BLD CALC: 40.4 % (ref 36–48)
HEMOGLOBIN: 13.9 G/DL (ref 12–16)
LYMPHOCYTES ABSOLUTE: 1.2 K/UL (ref 1–5.1)
LYMPHOCYTES RELATIVE PERCENT: 10.8 %
MCH RBC QN AUTO: 32 PG (ref 26–34)
MCHC RBC AUTO-ENTMCNC: 34.3 G/DL (ref 31–36)
MCV RBC AUTO: 93.3 FL (ref 80–100)
MONOCYTES ABSOLUTE: 0.6 K/UL (ref 0–1.3)
MONOCYTES RELATIVE PERCENT: 5.7 %
NEUTROPHILS ABSOLUTE: 8.8 K/UL (ref 1.7–7.7)
NEUTROPHILS RELATIVE PERCENT: 81.4 %
PDW BLD-RTO: 13.2 % (ref 12.4–15.4)
PERFORMED ON: ABNORMAL
PERFORMED ON: NORMAL
PLATELET # BLD: 262 K/UL (ref 135–450)
PMV BLD AUTO: 8.5 FL (ref 5–10.5)
POTASSIUM REFLEX MAGNESIUM: 3.7 MMOL/L (ref 3.5–5.1)
RBC # BLD: 4.33 M/UL (ref 4–5.2)
SODIUM BLD-SCNC: 131 MMOL/L (ref 136–145)
TOTAL PROTEIN: 5.8 G/DL (ref 6.4–8.2)
WBC # BLD: 10.8 K/UL (ref 4–11)

## 2021-12-08 PROCEDURE — 6370000000 HC RX 637 (ALT 250 FOR IP): Performed by: NURSE PRACTITIONER

## 2021-12-08 PROCEDURE — 6370000000 HC RX 637 (ALT 250 FOR IP): Performed by: INTERNAL MEDICINE

## 2021-12-08 PROCEDURE — 85025 COMPLETE CBC W/AUTO DIFF WBC: CPT

## 2021-12-08 PROCEDURE — 6360000002 HC RX W HCPCS: Performed by: INTERNAL MEDICINE

## 2021-12-08 PROCEDURE — 99291 CRITICAL CARE FIRST HOUR: CPT | Performed by: INTERNAL MEDICINE

## 2021-12-08 PROCEDURE — 80053 COMPREHEN METABOLIC PANEL: CPT

## 2021-12-08 PROCEDURE — 2580000003 HC RX 258: Performed by: INTERNAL MEDICINE

## 2021-12-08 PROCEDURE — 2000000000 HC ICU R&B

## 2021-12-08 PROCEDURE — 2700000000 HC OXYGEN THERAPY PER DAY

## 2021-12-08 PROCEDURE — 94761 N-INVAS EAR/PLS OXIMETRY MLT: CPT

## 2021-12-08 PROCEDURE — 99233 SBSQ HOSP IP/OBS HIGH 50: CPT | Performed by: INTERNAL MEDICINE

## 2021-12-08 PROCEDURE — 94660 CPAP INITIATION&MGMT: CPT

## 2021-12-08 RX ADMIN — ENOXAPARIN SODIUM 30 MG: 100 INJECTION SUBCUTANEOUS at 21:25

## 2021-12-08 RX ADMIN — GUAIFENESIN AND DEXTROMETHORPHAN 5 ML: 100; 10 SYRUP ORAL at 06:13

## 2021-12-08 RX ADMIN — LEVOFLOXACIN 500 MG: 500 INJECTION, SOLUTION INTRAVENOUS at 16:14

## 2021-12-08 RX ADMIN — FAMOTIDINE 20 MG: 20 TABLET ORAL at 14:10

## 2021-12-08 RX ADMIN — ENOXAPARIN SODIUM 30 MG: 100 INJECTION SUBCUTANEOUS at 13:33

## 2021-12-08 RX ADMIN — PSYLLIUM HUSK 1 PACKET: 3.4 POWDER ORAL at 14:16

## 2021-12-08 RX ADMIN — QUETIAPINE FUMARATE 12.5 MG: 25 TABLET ORAL at 21:24

## 2021-12-08 RX ADMIN — ESCITALOPRAM OXALATE 5 MG: 10 TABLET ORAL at 14:10

## 2021-12-08 RX ADMIN — FAMOTIDINE 20 MG: 20 TABLET ORAL at 21:25

## 2021-12-08 RX ADMIN — SODIUM CHLORIDE, PRESERVATIVE FREE 10 ML: 5 INJECTION INTRAVENOUS at 22:04

## 2021-12-08 RX ADMIN — BENZONATATE 100 MG: 100 CAPSULE ORAL at 06:13

## 2021-12-08 RX ADMIN — QUETIAPINE FUMARATE 12.5 MG: 25 TABLET ORAL at 14:10

## 2021-12-08 RX ADMIN — LEVOTHYROXINE SODIUM 50 MCG: 0.03 TABLET ORAL at 14:10

## 2021-12-08 RX ADMIN — SODIUM CHLORIDE, PRESERVATIVE FREE 10 ML: 5 INJECTION INTRAVENOUS at 14:16

## 2021-12-08 RX ADMIN — MULTIPLE VITAMINS W/ MINERALS TAB 1 TABLET: TAB at 14:10

## 2021-12-08 RX ADMIN — LIOTHYRONINE SODIUM 5 MCG: 5 TABLET ORAL at 14:10

## 2021-12-08 RX ADMIN — DEXAMETHASONE SODIUM PHOSPHATE 6 MG: 10 INJECTION INTRAMUSCULAR; INTRAVENOUS at 09:49

## 2021-12-08 ASSESSMENT — PAIN SCALES - GENERAL
PAINLEVEL_OUTOF10: 0

## 2021-12-08 NOTE — PROGRESS NOTES
12/07/21 2226   NIV Type   Mode Bilevel   Mask Type Full face mask   Mask Size Medium   Settings/Measurements   IPAP 12 cmH20   CPAP/EPAP 8 cmH2O   Rate Ordered 16   Resp 22   FiO2  70 %   Vt Exhaled 652 mL   Comfort Level Good   SpO2 92

## 2021-12-08 NOTE — PROGRESS NOTES
Sp02 doen to 85% on 40L and 100% Vapo-therm. Pt encouraged to deep breathe. Sp02 with slow rise to 88%. Pt requesting Bed pan. Able to turn pt and sit up on bedpan quickly. Sp02 dropped to 63%. Very slow to recover. Sp02 averages 87-88% with reaching 90% for only a few seconds. Pt extremely weak and states that she is tired. Asked pt if she would want a ventilator if it came to it and she said that she was unsure right now. AM assessment completed, see flow sheet. Pt is alert and oriented. Respirations are labored and pt has to be told to slow her breathing and perform pursed lip breathing. Pt asked if she would be more comfortable on Bipap, and she said no. Pt laying on left side with bed in reverse trendelenburg position. Sp02 increased to 92% slowly. SR up x 2, and bed in low position. Call light is within reach.

## 2021-12-08 NOTE — PROGRESS NOTES
Pulmonary & Critical Care Medicine ICU Progress Note    CC: COVID-19    Events of Last 24 hours:   BiPAP overnight  70% FiO2   Vapotherm 40 % FiO2   Compliant with self proning   Precedex off         Vascular lines: IV: PICC 12/3    MV:       / / /FiO2 : 70 %  No results for input(s): PHART, [id number], PO2ART in the last 72 hours. IV:   dexmedetomidine HCl in NaCl 0.2 mcg/kg/hr (217)    sodium chloride Stopped (21 1632)       Vitals:  Blood pressure 131/60, pulse 81, temperature 98.9 °F (37.2 °C), temperature source Bladder, resp. rate 24, height 5' 4\" (1.626 m), weight 140 lb (63.5 kg), SpO2 95 %, not currently breastfeeding. On Vapotherm    Temp  Av.4 °F (36.9 °C)  Min: 98.2 °F (36.8 °C)  Max: 98.9 °F (37.2 °C)    Intake/Output Summary (Last 24 hours) at 2021 0708  Last data filed at 2021 1736  Gross per 24 hour   Intake 900 ml   Output 2070 ml   Net -1170 ml     PE:  General: ill appearing    Eyes: PERRL. No sclera icterus. No conjunctival injection. ENT: No discharge. Pharynx clear. Neck: Trachea midline. Normal thyroid. Resp: + accessory muscle use. Few crackles. No wheezing. No rhonchi. No dullness on percussion. CV: Regular rate. Regular rhythm. No mumur or rub. No edema. GI: Non-tender. Non-distended. No masses. No organomegaly. Normal bowel sounds. No hernia. Skin: Warm and dry. No nodule on exposed extremities. No rash on exposed extremities. Lymph: No cervical LAD. No supraclavicular LAD. M/S: No cyanosis. No joint deformity. No clubbing. Neuro: AAOx3. Followed commands.    Psych: No agitation, no anxiety, affect is full       Scheduled Meds:   influenza virus vaccine  0.5 mL IntraMUSCular Prior to discharge    escitalopram  5 mg Oral Daily    QUEtiapine  12.5 mg Oral BID    psyllium  1 packet Oral Daily    levofloxacin  500 mg IntraVENous Q24H    lidocaine 1 % injection  5 mL IntraDERmal Once    dexamethasone  6 mg IntraVENous Q24H    vitamin D  50,000 Units Oral Weekly    famotidine  20 mg Oral BID    sodium chloride flush  5-40 mL IntraVENous 2 times per day    enoxaparin  30 mg SubCUTAneous BID    levothyroxine  50 mcg Oral Daily    liothyronine  5 mcg Oral Daily    therapeutic multivitamin-minerals  1 tablet Oral Daily       Data:  CBC:   Recent Labs     12/06/21  0350 12/07/21  0425 12/08/21  0600   WBC 7.9 8.7 10.8   HGB 13.7 13.4 13.9   HCT 40.4 40.0 40.4   MCV 92.9 92.7 93.3    259 262     BMP:   Recent Labs     12/06/21  0350 12/07/21  0425 12/08/21  0600    137 131*   K 3.6 3.6 3.7   CL 99 98* 94*   CO2 30 29 30   BUN 16 17 25*   CREATININE <0.5* 0.6 0.6     LIVER PROFILE:   Recent Labs     12/06/21  0350 12/07/21  0425 12/08/21  0600   AST 49* 38* 46*   ALT 36 27 31   BILITOT 0.4 0.4 0.5   ALKPHOS 157* 140* 130*       Microbiology:  12/1 [facility] NGTD    Imaging:  CTPA 12/6 imaging reviewed by me and showed  Diffuse bilateral airspace disease-worsening          ASSESSMENT:  · Acute hypoxemic respiratory failure -severe progressive life-threatening  · COVID-19 viral pneumonia  · ARDS  · Transminitis   · Electrolytes disorder   · Anxiety/agitation   · Not vaccinated for COVID-19        PLAN:  Vapotherm for life-threatening acute hypoxemic respiratory failure and titrate to maintain SaO2 >92%  Bilevel non-invasive positive pressure ventilation per my orders- change 12/8   Precedex only with BIPAP if needed   Levaquin D#3  Supervised self proning   Droplet plus isolation  Dexamethasone 6 mg IV D#8-monitor blood glucose closely  Tocilizumab 12/3/21.  Post 2 doses of Baricitinib   Seroquel 12.5 PO Q12 hrs   Resumed Lexapro   DVT prophylaxis: Lovenox BID   MRSA prophylaxis: Bactroban  Full code   Discussed with daughter at the bedside today and multiple good questions were answered             Total critical care time caring for this patient with life threatening, unstable organ failure, including direct patient contact, management of life support systems, review of data including imaging and labs, discussions with other team members and physicians is 31 minutes, excluding procedures.

## 2021-12-08 NOTE — CARE COORDINATION
INTERDISCIPLINARY PLAN OF CARE CONFERENCE    Date/Time: 12/8/2021 11:58 AM  Completed by: Da Caballero RN, Case Management      Patient Name:  Suzanna Arcos  YOB: 1950  Admitting Diagnosis: Acute respiratory disease due to COVID-19 virus [U07.1, J06.9]  Acute respiratory failure due to COVID-19 ([address] [U07.1, J96.00]     Admit Date/Time:  12/1/2021  4:32 AM    Chart reviewed. Interdisciplinary team contacted or reviewed plan related to patient progress and discharge plans. Disciplines included Case Management, Nursing, and Dietitian. Current Status:inpt,ICU LOC  PT/OT recommendation for discharge plan of care: tbd    Expected D/C Disposition:  tbd    Discharge Plan Comments: Reviewed chart. Pt continues in ICU on Vapotherm, daughter at bedside. Pt from home alone.  following    Home O2 in place on admit: No  Pt informed of need to bring portable home O2 tank on day of discharge for nursing to connect prior to leaving:  Not Indicated  Verbalized agreement/Understanding:  Not Indicated

## 2021-12-08 NOTE — PROGRESS NOTES
Internal Medicine ICU Progress Note      Events of Last 24 hours:     71-year-old female admitted to hospital with COVID-19. She has a past medical history of depression. She tested positive for Covid about 10 days ago. She apparently was taking ivermectin at home. She came to the emergency room and had a saturation of 70%. She was admitted to hospital and then transferred the ICU. She is presently on Vapotherm. Pulmonologist discussed with patient and family and apparently she wanted to be a DNI. RN came and told me later on that she was considering intubation. She is unvaccinated. 12/3-was 100% Vapotherm during rounds. She was down to 80% Vapotherm in the prone position but had to be made supine for PICC line insertion. Plan is to reprone her and try to decrease FiO2. She is on Precedex    -patient has been manually self proning. FiO2 down to 80%. On 40 L/min. Refused BiPAP overnight. - used Bipap last night. On 80% vapo therm. Feeling about the same.   Currently on Vapotherm 100% 40 L   BiPAP overnight        Now on Vapotherm 70% FiO2 40 L/min  Used BiPAP overnight  She is  self proning      Seen in prone position  On Vapotherm 100% FiO2 40 L/min      Invasive Lines: PIV      MV: N/AA    No results for input(s): PHART, [id number], PO2ART in the last 72 hours.     MV Settings:     / / /FiO2 : 100 %    IV:   sodium chloride Stopped (21 1632)       Vitals:  Temp  Av °F (37.2 °C)  Min: 98.2 °F (36.8 °C)  Max: 99.9 °F (37.7 °C)  Pulse  Av.3  Min: 62  Max: 110  BP  Min: 80/48  Max: 152/44  SpO2  Av.9 %  Min: 87 %  Max: 98 %  FiO2   Av.3 %  Min: 70 %  Max: 100 %  Patient Vitals for the past 4 hrs:   BP Temp Temp src Pulse Resp SpO2   21 1230 (!) 129/51 99.9 °F (37.7 °C) Bladder 99 17 98 %   21 1100 (!) 124/47 99.8 °F (37.7 °C) Bladder 96 21 97 %   21 1000 (!) 125/49 -- -- 95 19 93 %       CVP:        Intake/Output Summary (Last 24 hours) at 12/8/2021 1327  Last data filed at 12/8/2021 1223  Gross per 24 hour   Intake 300 ml   Output 1745 ml   Net -1445 ml       EXAM:  General: Awake and alert. Ill-appearing. In moderate distress. Seen in prone position  Eyes: PERRL. No sclera icterus. No conjunctiva injected. ENT: No discharge. Pharynx clear. Neck: Trachea midline. Normal thyroid. Resp: + accessory muscle use. + crackles. No wheezing. No rhonchi. No dullness on percussion. CV: Regular rate. Regular rhythm. No mumur or rub. No edema. No JVD. Palpable pedal pulses. GI: Non-tender. Non-distended. No masses. No organmegaly. Normal bowel sounds. No hernia. Skin: Warm and dry. No nodule on exposed extremities. No rash on exposed extremities. Lymph: No cervical LAD. No supraclavicular LAD. M/S: No cyanosis. No joint deformity. No clubbing. Neuro: Awake. Follows commands. Positive pupils/gag/corneals. Normal pain response. Psych: Oriented to person, place, time. No anxiety or agitation.      Medications:  Scheduled Meds:   influenza virus vaccine  0.5 mL IntraMUSCular Prior to discharge    escitalopram  5 mg Oral Daily    QUEtiapine  12.5 mg Oral BID    psyllium  1 packet Oral Daily    levofloxacin  500 mg IntraVENous Q24H    lidocaine 1 % injection  5 mL IntraDERmal Once    dexamethasone  6 mg IntraVENous Q24H    vitamin D  50,000 Units Oral Weekly    famotidine  20 mg Oral BID    sodium chloride flush  5-40 mL IntraVENous 2 times per day    enoxaparin  30 mg SubCUTAneous BID    levothyroxine  50 mcg Oral Daily    liothyronine  5 mcg Oral Daily    therapeutic multivitamin-minerals  1 tablet Oral Daily       PRN Meds:  bismuth subsalicylate, melatonin, benzonatate, sodium chloride flush, sodium chloride, ondansetron **OR** ondansetron, polyethylene glycol, acetaminophen **OR** acetaminophen, guaiFENesin-dextromethorphan, albuterol sulfate HFA    Results:  CBC:   Recent Labs     12/06/21  0350 12/07/21  9466 12/08/21  0600   WBC 7.9 8.7 10.8   HGB 13.7 13.4 13.9   HCT 40.4 40.0 40.4   MCV 92.9 92.7 93.3    259 262     BMP:   Recent Labs     12/06/21  0350 12/07/21  0425 12/08/21  0600    137 131*   K 3.6 3.6 3.7   CL 99 98* 94*   CO2 30 29 30   BUN 16 17 25*   CREATININE <0.5* 0.6 0.6     LIVER PROFILE:   Recent Labs     12/06/21  0350 12/07/21  0425 12/08/21  0600   AST 49* 38* 46*   ALT 36 27 31   BILITOT 0.4 0.4 0.5   ALKPHOS 157* 140* 130*   Results for Gregg Beard (MRN [id number]) as of 12/2/2021 11:46   Ref. Range 12/1/2021 04:30   Procalcitonin Latest Ref Range: 0.00 - 0.15 ng/mL 0.12       Cultures:  COVID positive outside lab    Films:    XR CHEST PORTABLE   Final Result   Moderate diffuse airspace disease compatible with multifocal pneumonia with   asymmetric involvement and consolidation left lower lobe. This could   represent COVID pneumonia with superimposed edema. IR PICC WO SQ PORT/PUMP > 5 YEARS   Final Result   Successful placement of PICC line. CT CHEST PULMONARY EMBOLISM W CONTRAST   Final Result   No evidence of pulmonary embolism. Extensive bilateral multifocal airspace disease compatible with multifocal   pneumonia, specifically COVID pneumonia. XR CHEST PORTABLE   Final Result   Multifocal peripherally located pulmonary opacities are seen, likely related   to COVID-19 pneumonia. Assessment:    Principal Problem:    Acute respiratory disease due to COVID-19 virus  Active Problems:    Depression    Hypokalemia    Elevated LFTs    Leukopenia    Acute respiratory failure with hypoxia (HCC)    Pneumonia due to COVID-19 virus    Acquired hypothyroidism    Transaminitis    Electrolyte disorder    ARDS (adult respiratory distress syndrome) (HCC)    Agitation    Acute hypoxemic respiratory failure (HCC)  Resolved Problems:    * No resolved hospital problems. *       Plan:    #Acute respiratory failure with hypoxemia due to COVID-19.   Patient admitted to hospital.  Pulmonary consultation. Supplemental oxygen. Continuous pulse ox and telemetry. On Vapotherm. May require intubation. CODE STATUS changed to full code. Continue with supervised self proning. Patient agreeable with intubation if required. Wants to postpone intubation as much as possible. Currently on Vapotherm 100% 40 L--> 70%  40 L --->100% 40 L Precedex when on BiPAP. Self proning. #COVID-19 pneumonia. Work-up consistent with COVID-19 pneumonia. She is hypoxic. She meets criteria for treatment. Started on Decadron 12 mg a day. Presently on 6 mg of Decadron day #8. Monitor sugars closely. Status post Tocilizumab 12/3/2021. Post 2 doses of baricitinib    #Acquired hypothyroidism. Continue home medication. #Depression.   Stable    #Lovenox 30 mg subcu twice daily    #Transaminitis due to Tiffanywilly Lopez MD 1:27 PM 12/8/2021

## 2021-12-08 NOTE — PROGRESS NOTES
Occupational Therapy  Pt in prone position, hold therapy this day and follow up as schedule allows.    Vivian Grant OTR/L #[id number]

## 2021-12-08 NOTE — PROGRESS NOTES
Daughter Dulcy Omani here at this time. Updated on events of overnight and this morning. Explained to her that pts Sp02 dropped to 63%, while maxed out on vapo-therm with minimal exertion. Daughter thinks that it may be the sedation that pt has been on. Made daughter aware that MD will be around shortly for rounds.

## 2021-12-08 NOTE — PROGRESS NOTES
Daughter at bedside providing pt with guided imagery. Pt appears more comfortable and peaceful. Pt wanting to eat breakfast right now. Food warmed up and brought into room. Pt requesting to take AM meds after eating. Denies further needs at this time.  at this time. Pharmacist made aware.

## 2021-12-09 LAB
GLUCOSE BLD-MCNC: 116 MG/DL (ref 70–99)
PERFORMED ON: ABNORMAL

## 2021-12-09 PROCEDURE — 6370000000 HC RX 637 (ALT 250 FOR IP): Performed by: INTERNAL MEDICINE

## 2021-12-09 PROCEDURE — 6360000002 HC RX W HCPCS: Performed by: INTERNAL MEDICINE

## 2021-12-09 PROCEDURE — 99233 SBSQ HOSP IP/OBS HIGH 50: CPT | Performed by: INTERNAL MEDICINE

## 2021-12-09 PROCEDURE — 2580000003 HC RX 258: Performed by: INTERNAL MEDICINE

## 2021-12-09 PROCEDURE — 2000000000 HC ICU R&B

## 2021-12-09 PROCEDURE — 94660 CPAP INITIATION&MGMT: CPT

## 2021-12-09 PROCEDURE — 94761 N-INVAS EAR/PLS OXIMETRY MLT: CPT

## 2021-12-09 PROCEDURE — 99291 CRITICAL CARE FIRST HOUR: CPT | Performed by: INTERNAL MEDICINE

## 2021-12-09 PROCEDURE — 2700000000 HC OXYGEN THERAPY PER DAY

## 2021-12-09 RX ORDER — OXYCODONE HYDROCHLORIDE 5 MG/1
5 TABLET ORAL EVERY 6 HOURS PRN
Status: DISCONTINUED | OUTPATIENT
Start: 2021-12-09 | End: 2021-12-23 | Stop reason: HOSPADM

## 2021-12-09 RX ADMIN — ACETAMINOPHEN 650 MG: 325 TABLET ORAL at 21:21

## 2021-12-09 RX ADMIN — ENOXAPARIN SODIUM 30 MG: 100 INJECTION SUBCUTANEOUS at 21:21

## 2021-12-09 RX ADMIN — OXYCODONE 5 MG: 5 TABLET ORAL at 12:16

## 2021-12-09 RX ADMIN — SODIUM CHLORIDE, PRESERVATIVE FREE 10 ML: 5 INJECTION INTRAVENOUS at 21:50

## 2021-12-09 RX ADMIN — BISMUTH SUBSALICYLATE 30 ML: 525 LIQUID ORAL at 11:23

## 2021-12-09 RX ADMIN — QUETIAPINE FUMARATE 12.5 MG: 25 TABLET ORAL at 21:21

## 2021-12-09 RX ADMIN — GUAIFENESIN AND DEXTROMETHORPHAN 5 ML: 100; 10 SYRUP ORAL at 21:21

## 2021-12-09 RX ADMIN — BENZONATATE 100 MG: 100 CAPSULE ORAL at 21:21

## 2021-12-09 RX ADMIN — FAMOTIDINE 20 MG: 20 TABLET ORAL at 21:21

## 2021-12-09 RX ADMIN — LEVOFLOXACIN 500 MG: 500 INJECTION, SOLUTION INTRAVENOUS at 15:15

## 2021-12-09 ASSESSMENT — PAIN SCALES - GENERAL
PAINLEVEL_OUTOF10: 7
PAINLEVEL_OUTOF10: 4
PAINLEVEL_OUTOF10: 0

## 2021-12-09 NOTE — PLAN OF CARE
Writer called to the patients room. Patient states \" I want all of this off, I want the wires off. . all of it. I do not want the vent. Get it off now\" Patient did not appear to be upset. She was very direct in her speech. She is alert and oriented x 4. Patient educated and consoled. Called to Patients room. Patient states \"either get this off or I will start ripping it off myself\" again provided support and education. Patient request \"the regular nasal canula\". Educated provided. Patient remains on Vapotherm. 40L 100%    Patient refusing labs to be drawn    Patient refuses blood pressure cuff    Patient request a hospice consult. Daughter [employer] updated.

## 2021-12-09 NOTE — PROGRESS NOTES
Pulmonary & Critical Care Medicine ICU Progress Note    CC: COVID-19    Events of Last 24 hours:   Refused labs and meds   Wants to go hospice   BiPAP overnight 12/8 cmH2O 100% FiO2   Vapotherm 40 LPM 90% FiO2           Vascular lines: IV: PICC 12/3    MV:       / / /FiO2 : (S) 85 %  No results for input(s): PHART, TOC7ONB, PO2ART in the last 72 hours. IV:   sodium chloride Stopped (21 1632)       Vitals:  Blood pressure (!) 117/57, pulse 83, temperature 99.5 °F (37.5 °C), temperature source Bladder, resp. rate 18, height 5' 4\" (1.626 m), weight 140 lb (63.5 kg), SpO2 96 %, not currently breastfeeding. On Vapotherm    Temp  Av.7 °F (37.6 °C)  Min: 99.2 °F (37.3 °C)  Max: 100 °F (37.8 °C)    Intake/Output Summary (Last 24 hours) at 2021 0658  Last data filed at 2021 1900  Gross per 24 hour   Intake 800 ml   Output 1305 ml   Net -505 ml     PE:  General: ill appearing    Eyes: PERRL. No sclera icterus. No conjunctival injection. ENT: No discharge. Pharynx clear. Neck: Trachea midline. Normal thyroid. Resp: + accessory muscle use. Few crackles. No wheezing. Few rhonchi. No dullness on percussion. CV: Regular rate. Regular rhythm. No mumur or rub. No edema. GI: Non-tender. Non-distended. No masses. No organomegaly. Normal bowel sounds. No hernia. Skin: Warm and dry. No nodule on exposed extremities. No rash on exposed extremities. Lymph: No cervical LAD. No supraclavicular LAD. M/S: No cyanosis. No joint deformity. No clubbing. Neuro: AAOx3. Followed commands.    Psych: No agitation, no anxiety, affect is full       Scheduled Meds:   influenza virus vaccine  0.5 mL IntraMUSCular Prior to discharge    escitalopram  5 mg Oral Daily    QUEtiapine  12.5 mg Oral BID    psyllium  1 packet Oral Daily    levofloxacin  500 mg IntraVENous Q24H    lidocaine 1 % injection  5 mL IntraDERmal Once    dexamethasone  6 mg IntraVENous Q24H    vitamin D  50,000 Units Oral Weekly    famotidine  20 mg Oral BID    sodium chloride flush  5-40 mL IntraVENous 2 times per day    enoxaparin  30 mg SubCUTAneous BID    levothyroxine  50 mcg Oral Daily    liothyronine  5 mcg Oral Daily    therapeutic multivitamin-minerals  1 tablet Oral Daily       Data:  CBC:   Recent Labs     12/07/21  0425 12/08/21  0600   WBC 8.7 10.8   HGB 13.4 13.9   HCT 40.0 40.4   MCV 92.7 93.3    262     BMP:   Recent Labs     12/07/21 0425 12/08/21  0600    131*   K 3.6 3.7   CL 98* 94*   CO2 29 30   BUN 17 25*   CREATININE 0.6 0.6     LIVER PROFILE:   Recent Labs     12/07/21 0425 12/08/21  0600   AST 38* 46*   ALT 27 31   BILITOT 0.4 0.5   ALKPHOS 140* 130*       Microbiology:  12/1 [facility] NGTD    Imaging:  CTPA 12/6 imaging reviewed by me and showed  Diffuse bilateral airspace disease-worsening          ASSESSMENT:  · Acute hypoxemic respiratory failure -severe progressive life-threatening  · COVID-19 viral pneumonia  · ARDS  · Transminitis   · Electrolytes disorder   · Anxiety/agitation   · Not vaccinated for COVID-19        PLAN:  Vapotherm for life-threatening acute hypoxemic respiratory failure and titrate to maintain SaO2 >92%  Bilevel non-invasive positive pressure ventilation per my orders- change 12/8   Precedex only with BIPAP if needed   Levaquin D#4  Supervised self proning -encouraged today  Droplet plus isolation  Dexamethasone 6 mg IV D#9-monitor blood glucose closely  Tocilizumab 12/3/21. Post 2 doses of Baricitinib   Seroquel 12.5 PO Q12 hrs   Resumed Lexapro   Resume homeOxy PRN   DVT prophylaxis: Lovenox BID   MRSA prophylaxis: Bactroban  Discussed with 2 daughters at the bedside today and multiple good questions were answered. Patient today wishes to be DNR CCA and her daughters both concurred with her decision.              Total critical care time caring for this patient with life threatening, unstable organ failure, including direct patient contact, management of life support systems, review of data including imaging and labs, discussions with other team members and physicians is 32 minutes, excluding procedures.

## 2021-12-09 NOTE — PLAN OF CARE
Continuing to monitor pain and discomfort. Monitoring pain level on scale of CPOT Non- pharmacological measures encouraged to reduce discomfort/pain. PRN pain meds administeration continues when/if applicable as ordered by physician. Monitoring patient skin integrity for skin breakdown, turning and repositioning q2h per protocol.

## 2021-12-09 NOTE — PROGRESS NOTES
11:20 Critical care rounds completed @ beside w/ Dr. Cathleen Marcos. MD updated on pt current status and all current labs; MD aware  Patient refusing labs to be drawn,Patient refuses blood pressure cuff   Patient request a hospice consult.   Family meeting @ this time w/ Pt and 2 daughters : Lorren Schilder and [employer] , after in depth conversation MD by pt and daughters , pt does not want hospice @ this time but does want to change code status to a DNR CC A, Pt  told MD his herself , MD updated code status order per pt wishes, pt now in agreement to let BP be  taken every 2 hours   16:00 Pt remains awake a&o x4, VSS reassessment unchanged and as charted

## 2021-12-09 NOTE — PROGRESS NOTES
12/09/21 0301   NIV Type   Equipment Type v60   Mode Bilevel   Mask Type Full face mask   Mask Size Medium   Settings/Measurements   IPAP 12 cmH20   CPAP/EPAP 8 cmH2O   Rate Ordered 16   Resp 19   FiO2  100 %   Vt Exhaled 596 mL   Minute Volume 11.9 Liters   Mask Leak (lpm) 17 lpm   Comfort Level Good   Using Accessory Muscles No   SpO2 98   Patient Observation   Observations spo2 98% on 100% bipap

## 2021-12-09 NOTE — PROGRESS NOTES
-- -- 17 96 %   12/09/21 1100 -- -- -- 109 18 90 %   12/09/21 1000 -- -- -- 99 22 93 %       CVP:        Intake/Output Summary (Last 24 hours) at 12/9/2021 1339  Last data filed at 12/8/2021 1900  Gross per 24 hour   Intake 240 ml   Output 525 ml   Net -285 ml       EXAM:  General: Awake and alert. Ill-appearing. In moderate distress. Seen in prone position  Eyes: PERRL. No sclera icterus. No conjunctiva injected. ENT: No discharge. Pharynx clear. Neck: Trachea midline. Normal thyroid. Resp: + accessory muscle use. + crackles. No wheezing. No rhonchi. No dullness on percussion. CV: Regular rate. Regular rhythm. No mumur or rub. No edema. No JVD. Palpable pedal pulses. GI: Non-tender. Non-distended. No masses. No organmegaly. Normal bowel sounds. No hernia. Skin: Warm and dry. No nodule on exposed extremities. No rash on exposed extremities. Lymph: No cervical LAD. No supraclavicular LAD. M/S: No cyanosis. No joint deformity. No clubbing. Neuro: Awake. Follows commands. Positive pupils/gag/corneals. Normal pain response. Psych: Oriented to person, place, time. No anxiety or agitation.      Medications:  Scheduled Meds:   influenza virus vaccine  0.5 mL IntraMUSCular Prior to discharge    escitalopram  5 mg Oral Daily    QUEtiapine  12.5 mg Oral BID    psyllium  1 packet Oral Daily    levofloxacin  500 mg IntraVENous Q24H    lidocaine 1 % injection  5 mL IntraDERmal Once    dexamethasone  6 mg IntraVENous Q24H    vitamin D  50,000 Units Oral Weekly    famotidine  20 mg Oral BID    sodium chloride flush  5-40 mL IntraVENous 2 times per day    enoxaparin  30 mg SubCUTAneous BID    levothyroxine  50 mcg Oral Daily    liothyronine  5 mcg Oral Daily    therapeutic multivitamin-minerals  1 tablet Oral Daily       PRN Meds:  oxyCODONE, bismuth subsalicylate, melatonin, benzonatate, sodium chloride flush, sodium chloride, ondansetron **OR** ondansetron, polyethylene glycol, acetaminophen **OR** acetaminophen, guaiFENesin-dextromethorphan, albuterol sulfate HFA    Results:  CBC:   Recent Labs     12/07/21  0425 12/08/21  0600   WBC 8.7 10.8   HGB 13.4 13.9   HCT 40.0 40.4   MCV 92.7 93.3    262     BMP:   Recent Labs     12/07/21  0425 12/08/21  0600    131*   K 3.6 3.7   CL 98* 94*   CO2 29 30   BUN 17 25*   CREATININE 0.6 0.6     LIVER PROFILE:   Recent Labs     12/07/21  0425 12/08/21  0600   AST 38* 46*   ALT 27 31   BILITOT 0.4 0.5   ALKPHOS 140* 130*   Results for Evelyn Castañeda (MRN [id number]) as of 12/2/2021 11:46   Ref. Range 12/1/2021 04:30   Procalcitonin Latest Ref Range: 0.00 - 0.15 ng/mL 0.12       Cultures:  COVID positive outside lab    Films:    XR CHEST PORTABLE   Final Result   Moderate diffuse airspace disease compatible with multifocal pneumonia with   asymmetric involvement and consolidation left lower lobe. This could   represent COVID pneumonia with superimposed edema. IR PICC WO SQ PORT/PUMP > 5 YEARS   Final Result   Successful placement of PICC line. CT CHEST PULMONARY EMBOLISM W CONTRAST   Final Result   No evidence of pulmonary embolism. Extensive bilateral multifocal airspace disease compatible with multifocal   pneumonia, specifically COVID pneumonia. XR CHEST PORTABLE   Final Result   Multifocal peripherally located pulmonary opacities are seen, likely related   to COVID-19 pneumonia. Assessment:    Principal Problem:    Acute respiratory disease due to COVID-19 virus  Active Problems:    Depression    Hypokalemia    Elevated LFTs    Leukopenia    Acute respiratory failure with hypoxia (HCC)    Pneumonia due to COVID-19 virus    Acquired hypothyroidism    Transaminitis    Electrolyte disorder    ARDS (adult respiratory distress syndrome) (HCC)    Agitation    Acute hypoxemic respiratory failure (HCC)  Resolved Problems:    * No resolved hospital problems.  *       Plan:    #Acute respiratory failure with hypoxemia due to COVID-19. Patient admitted to hospital.  Pulmonary consultation. Supplemental oxygen. Continuous pulse ox and telemetry. On Vapotherm. May require intubation. CODE STATUS changed to full code. Continue with supervised self proning. Patient agreeable with intubation if required. Wants to postpone intubation as much as possible. Currently on Vapotherm 100% 40 L--> 70%  40 L --->100% 40 L --> 90% 40 L Precedex when on BiPAP. Self proning. #COVID-19 pneumonia. Work-up consistent with COVID-19 pneumonia. She is hypoxic. She meets criteria for treatment. Started on Decadron 12 mg a day. Presently on 6 mg of Decadron day #9. Monitor sugars closely. Status post Tocilizumab 12/3/2021. Post 2 doses of baricitinib    #Acquired hypothyroidism. Continue home medication. #Depression.   Stable    #Lovenox 30 mg subcu twice daily    #Transaminitis due to COVID-19    DNR Stalin Feliz MD 1:39 PM 12/9/2021

## 2021-12-09 NOTE — PROGRESS NOTES
12/08/21 2307   NIV Type   Equipment Type v60   Mode Bilevel   Mask Type Full face mask   Mask Size Medium   Settings/Measurements   IPAP 12 cmH20   CPAP/EPAP 8 cmH2O   Rate Ordered 16   Resp 26   FiO2  100 %   Vt Exhaled 594 mL   Minute Volume 15.2 Liters   Mask Leak (lpm) 9 lpm   Comfort Level Good   Using Accessory Muscles No   SpO2 93   Patient Observation   Observations spo2 93% on 100% bipap

## 2021-12-10 LAB
A/G RATIO: 1.1 (ref 1.1–2.2)
ALBUMIN SERPL-MCNC: 2.9 G/DL (ref 3.4–5)
ALP BLD-CCNC: 130 U/L (ref 40–129)
ALT SERPL-CCNC: 26 U/L (ref 10–40)
ANION GAP SERPL CALCULATED.3IONS-SCNC: 10 MMOL/L (ref 3–16)
AST SERPL-CCNC: 47 U/L (ref 15–37)
BASOPHILS ABSOLUTE: 0 K/UL (ref 0–0.2)
BASOPHILS RELATIVE PERCENT: 0.3 %
BILIRUB SERPL-MCNC: 0.5 MG/DL (ref 0–1)
BILIRUBIN URINE: NEGATIVE
BLOOD, URINE: ABNORMAL
BUN BLDV-MCNC: 16 MG/DL (ref 7–20)
CALCIUM SERPL-MCNC: 8.1 MG/DL (ref 8.3–10.6)
CHLORIDE BLD-SCNC: 96 MMOL/L (ref 99–110)
CLARITY: CLEAR
CO2: 29 MMOL/L (ref 21–32)
COLOR: YELLOW
CREAT SERPL-MCNC: 0.6 MG/DL (ref 0.6–1.2)
EOSINOPHILS ABSOLUTE: 0.1 K/UL (ref 0–0.6)
EOSINOPHILS RELATIVE PERCENT: 0.9 %
GFR AFRICAN AMERICAN: >60
GFR NON-AFRICAN AMERICAN: >60
GLUCOSE BLD-MCNC: 108 MG/DL (ref 70–99)
GLUCOSE BLD-MCNC: 120 MG/DL (ref 70–99)
GLUCOSE URINE: NEGATIVE MG/DL
HCT VFR BLD CALC: 41.2 % (ref 36–48)
HEMOGLOBIN: 13.8 G/DL (ref 12–16)
KETONES, URINE: ABNORMAL MG/DL
LEUKOCYTE ESTERASE, URINE: ABNORMAL
LYMPHOCYTES ABSOLUTE: 0.8 K/UL (ref 1–5.1)
LYMPHOCYTES RELATIVE PERCENT: 7.8 %
MCH RBC QN AUTO: 31.2 PG (ref 26–34)
MCHC RBC AUTO-ENTMCNC: 33.6 G/DL (ref 31–36)
MCV RBC AUTO: 93.1 FL (ref 80–100)
MICROSCOPIC EXAMINATION: YES
MONOCYTES ABSOLUTE: 0.4 K/UL (ref 0–1.3)
MONOCYTES RELATIVE PERCENT: 4.3 %
NEUTROPHILS ABSOLUTE: 8.9 K/UL (ref 1.7–7.7)
NEUTROPHILS RELATIVE PERCENT: 86.7 %
NITRITE, URINE: NEGATIVE
PDW BLD-RTO: 13.8 % (ref 12.4–15.4)
PERFORMED ON: ABNORMAL
PH UA: 5.5 (ref 5–8)
PLATELET # BLD: 215 K/UL (ref 135–450)
PMV BLD AUTO: 8.2 FL (ref 5–10.5)
POTASSIUM REFLEX MAGNESIUM: 3.7 MMOL/L (ref 3.5–5.1)
PROTEIN UA: 30 MG/DL
RBC # BLD: 4.42 M/UL (ref 4–5.2)
SODIUM BLD-SCNC: 135 MMOL/L (ref 136–145)
SPECIFIC GRAVITY UA: 1.02 (ref 1–1.03)
TOTAL PROTEIN: 5.5 G/DL (ref 6.4–8.2)
URINE REFLEX TO CULTURE: NO
URINE TYPE: ABNORMAL
UROBILINOGEN, URINE: 0.2 E.U./DL
WBC # BLD: 10.3 K/UL (ref 4–11)

## 2021-12-10 PROCEDURE — 94660 CPAP INITIATION&MGMT: CPT

## 2021-12-10 PROCEDURE — 6370000000 HC RX 637 (ALT 250 FOR IP): Performed by: INTERNAL MEDICINE

## 2021-12-10 PROCEDURE — 2580000003 HC RX 258: Performed by: INTERNAL MEDICINE

## 2021-12-10 PROCEDURE — 99233 SBSQ HOSP IP/OBS HIGH 50: CPT | Performed by: INTERNAL MEDICINE

## 2021-12-10 PROCEDURE — 6360000002 HC RX W HCPCS: Performed by: INTERNAL MEDICINE

## 2021-12-10 PROCEDURE — 87205 SMEAR GRAM STAIN: CPT

## 2021-12-10 PROCEDURE — 94761 N-INVAS EAR/PLS OXIMETRY MLT: CPT

## 2021-12-10 PROCEDURE — 80053 COMPREHEN METABOLIC PANEL: CPT

## 2021-12-10 PROCEDURE — 87070 CULTURE OTHR SPECIMN AEROBIC: CPT

## 2021-12-10 PROCEDURE — 51702 INSERT TEMP BLADDER CATH: CPT

## 2021-12-10 PROCEDURE — 99291 CRITICAL CARE FIRST HOUR: CPT | Performed by: INTERNAL MEDICINE

## 2021-12-10 PROCEDURE — 81003 URINALYSIS AUTO W/O SCOPE: CPT

## 2021-12-10 PROCEDURE — 6370000000 HC RX 637 (ALT 250 FOR IP): Performed by: NURSE PRACTITIONER

## 2021-12-10 PROCEDURE — 2700000000 HC OXYGEN THERAPY PER DAY

## 2021-12-10 PROCEDURE — 36415 COLL VENOUS BLD VENIPUNCTURE: CPT

## 2021-12-10 PROCEDURE — 87040 BLOOD CULTURE FOR BACTERIA: CPT

## 2021-12-10 PROCEDURE — 2000000000 HC ICU R&B

## 2021-12-10 PROCEDURE — 85025 COMPLETE CBC W/AUTO DIFF WBC: CPT

## 2021-12-10 RX ORDER — BENZONATATE 100 MG/1
200 CAPSULE ORAL 3 TIMES DAILY
Status: DISCONTINUED | OUTPATIENT
Start: 2021-12-10 | End: 2021-12-16

## 2021-12-10 RX ORDER — DEXAMETHASONE SODIUM PHOSPHATE 4 MG/ML
4 INJECTION, SOLUTION INTRA-ARTICULAR; INTRALESIONAL; INTRAMUSCULAR; INTRAVENOUS; SOFT TISSUE EVERY 24 HOURS
Status: DISCONTINUED | OUTPATIENT
Start: 2021-12-11 | End: 2021-12-11

## 2021-12-10 RX ADMIN — MULTIPLE VITAMINS W/ MINERALS TAB 1 TABLET: TAB at 12:58

## 2021-12-10 RX ADMIN — LIOTHYRONINE SODIUM 5 MCG: 5 TABLET ORAL at 12:59

## 2021-12-10 RX ADMIN — BENZONATATE 200 MG: 100 CAPSULE ORAL at 12:57

## 2021-12-10 RX ADMIN — ENOXAPARIN SODIUM 30 MG: 100 INJECTION SUBCUTANEOUS at 12:58

## 2021-12-10 RX ADMIN — Medication 5 MG: at 20:49

## 2021-12-10 RX ADMIN — FAMOTIDINE 20 MG: 20 TABLET ORAL at 20:49

## 2021-12-10 RX ADMIN — POLYETHYLENE GLYCOL (3350) 17 G: 17 POWDER, FOR SOLUTION ORAL at 12:58

## 2021-12-10 RX ADMIN — PIPERACILLIN SODIUM AND TAZOBACTAM SODIUM 3375 MG: 3; .375 INJECTION, POWDER, LYOPHILIZED, FOR SOLUTION INTRAVENOUS at 10:24

## 2021-12-10 RX ADMIN — BENZONATATE 200 MG: 100 CAPSULE ORAL at 20:43

## 2021-12-10 RX ADMIN — ESCITALOPRAM OXALATE 5 MG: 10 TABLET ORAL at 12:58

## 2021-12-10 RX ADMIN — LEVOTHYROXINE SODIUM 50 MCG: 0.03 TABLET ORAL at 12:58

## 2021-12-10 RX ADMIN — QUETIAPINE FUMARATE 12.5 MG: 25 TABLET ORAL at 20:49

## 2021-12-10 RX ADMIN — QUETIAPINE FUMARATE 12.5 MG: 25 TABLET ORAL at 12:58

## 2021-12-10 RX ADMIN — ACETAMINOPHEN 650 MG: 325 TABLET ORAL at 15:00

## 2021-12-10 RX ADMIN — ENOXAPARIN SODIUM 30 MG: 100 INJECTION SUBCUTANEOUS at 20:49

## 2021-12-10 RX ADMIN — PIPERACILLIN SODIUM AND TAZOBACTAM SODIUM 3375 MG: 3; .375 INJECTION, POWDER, LYOPHILIZED, FOR SOLUTION INTRAVENOUS at 16:31

## 2021-12-10 RX ADMIN — FAMOTIDINE 20 MG: 20 TABLET ORAL at 12:58

## 2021-12-10 ASSESSMENT — PAIN SCALES - GENERAL
PAINLEVEL_OUTOF10: 0
PAINLEVEL_OUTOF10: 0

## 2021-12-10 NOTE — PLAN OF CARE
Patient unable to tolerate bipap for extended periods.  Request frequent \"breaks\"     bipap Compliant from [phone] -[phone]  [phone]

## 2021-12-10 NOTE — CARE COORDINATION
INTERDISCIPLINARY PLAN OF CARE CONFERENCE    Date/Time: 12/10/2021 10:39 AM  Completed by: Ivan Ferrell RN, Case Management      Patient Name:  Jose Ramon Burger  YOB: 1950  Admitting Diagnosis: Acute respiratory disease due to COVID-19 virus [U07.1, J06.9]  Acute respiratory failure due to COVID-19 ([address] [U07.1, J96.00]     Admit Date/Time:  12/1/2021  4:32 AM    Chart reviewed. Interdisciplinary team contacted or reviewed plan related to patient progress and discharge plans. Disciplines included Case Management, Nursing, and Dietitian. Current Status:ICU C19+ on vapotherm [id number] Fever overnight; self proning  PT/OT recommendation for discharge plan of care: on hold    Discharge Plan Comments: Chart reviewed. Pt from home alone. PT/OT on hold, will follow and develop dcp as pt progresses.      Home O2 in place on admit: No

## 2021-12-10 NOTE — PROGRESS NOTES
Internal Medicine ICU Progress Note      Events of Last 24 hours:     78-year-old female admitted to hospital with COVID-19. She has a past medical history of depression. She tested positive for Covid about 10 days ago. She apparently was taking ivermectin at home. She came to the emergency room and had a saturation of 70%. She was admitted to hospital and then transferred the ICU. She is presently on Vapotherm. Pulmonologist discussed with patient and family and apparently she wanted to be a DNI. RN came and told me later on that she was considering intubation. She is unvaccinated. 12/3-was 100% Vapotherm during rounds. She was down to 80% Vapotherm in the prone position but had to be made supine for PICC line insertion. Plan is to reprone her and try to decrease FiO2. She is on Precedex    -patient has been manually self proning. FiO2 down to 80%. On 40 L/min. Refused BiPAP overnight. - used Bipap last night. On 80% vapo therm. Feeling about the same.   Currently on Vapotherm 100% 40 L   BiPAP overnight        Now on Vapotherm 70% FiO2 40 L/min  Used BiPAP overnight  She is  self proning      Seen in prone position  On Vapotherm 100% FiO2 40 L/min      Seen in supine position  On Vapotherm 90% 40 L    12/10  Used BiPAP off and on overnight. Currently 100% FiO2 40 L in supine position  Oxygen requirement goes down for a couple hours when she is in prone position. Fever overnight. Invasive Lines: PIV      MV: N/AA    No results for input(s): PHART, ZMT1VXV, PO2ART in the last 72 hours.     MV Settings:     / / /FiO2 : 100 %    IV:   sodium chloride Stopped (21 1632)       Vitals:  Temp  Av.1 °F (37.8 °C)  Min: 98.6 °F (37 °C)  Max: 101 °F (38.3 °C)  Pulse  Av.2  Min: 91  Max: 119  BP  Min: 121/58  Max: 136/60  SpO2  Av %  Min: 48 %  Max: 95 %  FiO2   Av.2 %  Min: 90 %  Max: 100 %  Patient Vitals for the past 4 hrs:   BP Temp Temp src Pulse Resp SpO2   12/10/21 1300 (!) 124/51 100.6 °F (38.1 °C) Bladder 108 15 90 %   12/10/21 1200 -- -- -- 95 22 91 %   12/10/21 1100 -- -- -- 102 21 93 %       CVP:        Intake/Output Summary (Last 24 hours) at 12/10/2021 1429  Last data filed at 12/10/2021 1416  Gross per 24 hour   Intake 600 ml   Output 720 ml   Net -120 ml       EXAM:  General: Awake and alert. Ill-appearing. In moderate distress. Seen in prone position  Eyes: PERRL. No sclera icterus. No conjunctiva injected. ENT: No discharge. Pharynx clear. Neck: Trachea midline. Normal thyroid. Resp: + accessory muscle use. + crackles. No wheezing. No rhonchi. No dullness on percussion. CV: Regular rate. Regular rhythm. No mumur or rub. No edema. No JVD. Palpable pedal pulses. GI: Non-tender. Non-distended. No masses. No organmegaly. Normal bowel sounds. No hernia. Skin: Warm and dry. No nodule on exposed extremities. No rash on exposed extremities. Lymph: No cervical LAD. No supraclavicular LAD. M/S: No cyanosis. No joint deformity. No clubbing. Neuro: Awake. Follows commands. Positive pupils/gag/corneals. Normal pain response. Psych: Oriented to person, place, time. No anxiety or agitation.      Medications:  Scheduled Meds:   piperacillin-tazobactam  3,375 mg IntraVENous Q8H    [START ON 12/11/2021] dexamethasone  4 mg IntraVENous Q24H    benzonatate  200 mg Oral TID    influenza virus vaccine  0.5 mL IntraMUSCular Prior to discharge    escitalopram  5 mg Oral Daily    QUEtiapine  12.5 mg Oral BID    psyllium  1 packet Oral Daily    lidocaine 1 % injection  5 mL IntraDERmal Once    vitamin D  50,000 Units Oral Weekly    famotidine  20 mg Oral BID    sodium chloride flush  5-40 mL IntraVENous 2 times per day    enoxaparin  30 mg SubCUTAneous BID    levothyroxine  50 mcg Oral Daily    liothyronine  5 mcg Oral Daily    therapeutic multivitamin-minerals  1 tablet Oral Daily       PRN Meds:  sodium chloride, oxyCODONE, bismuth subsalicylate, melatonin, sodium chloride flush, sodium chloride, ondansetron **OR** ondansetron, polyethylene glycol, acetaminophen **OR** acetaminophen, guaiFENesin-dextromethorphan, albuterol sulfate HFA    Results:  CBC:   Recent Labs     12/08/21  0600 12/10/21  0530   WBC 10.8 10.3   HGB 13.9 13.8   HCT 40.4 41.2   MCV 93.3 93.1    215     BMP:   Recent Labs     12/08/21  0600 12/10/21  0530   * 135*   K 3.7 3.7   CL 94* 96*   CO2 30 29   BUN 25* 16   CREATININE 0.6 0.6     LIVER PROFILE:   Recent Labs     12/08/21  0600 12/10/21  0530   AST 46* 47*   ALT 31 26   BILITOT 0.5 0.5   ALKPHOS 130* 130*   Results for Cecelia Ivey (MRN [id number]) as of 12/2/2021 11:46   Ref. Range 12/1/2021 04:30   Procalcitonin Latest Ref Range: 0.00 - 0.15 ng/mL 0.12       Cultures:  COVID positive outside lab    Films:    XR CHEST PORTABLE   Final Result   Moderate diffuse airspace disease compatible with multifocal pneumonia with   asymmetric involvement and consolidation left lower lobe. This could   represent COVID pneumonia with superimposed edema. IR PICC WO SQ PORT/PUMP > 5 YEARS   Final Result   Successful placement of PICC line. CT CHEST PULMONARY EMBOLISM W CONTRAST   Final Result   No evidence of pulmonary embolism. Extensive bilateral multifocal airspace disease compatible with multifocal   pneumonia, specifically COVID pneumonia. XR CHEST PORTABLE   Final Result   Multifocal peripherally located pulmonary opacities are seen, likely related   to COVID-19 pneumonia.                 Assessment:    Principal Problem:    Acute respiratory disease due to COVID-19 virus  Active Problems:    Depression    Hypokalemia    Elevated LFTs    Leukopenia    Acute respiratory failure with hypoxia (HCC)    Pneumonia due to COVID-19 virus    Acquired hypothyroidism    Transaminitis    Electrolyte disorder    ARDS (adult respiratory distress syndrome) (HCC)    Agitation    Acute hypoxemic respiratory failure (HCC)    Fever  Resolved Problems:    * No resolved hospital problems. *       Plan:    #Acute respiratory failure with hypoxemia due to COVID-19. Patient admitted to hospital.  Pulmonary consultation. Supplemental oxygen. Continuous pulse ox and telemetry. On Vapotherm. CODE STATUS changed to full code. Continue with supervised self proning. Patient agreeable with intubation if required. Wants to postpone intubation as much as possible. Currently on Vapotherm 100% 40 L--> 70%  40 L --->100% 40 L --> 90% 40 L. Precedex when on BiPAP. Self proning. #COVID-19 pneumonia. Work-up consistent with COVID-19 pneumonia. She is hypoxic. She meets criteria for treatment. Started on Decadron 12 mg a day. Presently on 6 mg of Decadron day #10--start taper. Monitor sugars closely. Status post Tocilizumab 12/3/2021. Post 2 doses of baricitinib    #Acquired hypothyroidism. Continue home medication. #Depression. Stable  Resumed Lexapro  Seroquel 12.5 mg p.o. every 12 hours.     #Lovenox 30 mg subcu twice daily    #Transaminitis due to COVID-19    DNR Prieto Wilcox MD 2:29 PM 12/10/2021

## 2021-12-10 NOTE — PROGRESS NOTES
Occupational Therapy  Held therapy this pm due to patient status, on 100% FiO2 40 L with code status discussion ongoing, will continue to follow as appropriate/as patient status allows.   Aditya Marcos, OTR/L [id number]

## 2021-12-10 NOTE — PROGRESS NOTES
Comprehensive Nutrition Assessment    Type and Reason for Visit:  Reassess    Nutrition Recommendations/Plan:   1. Continue ADULT DIET; Regular diet order. 2. Modified ONS to Ensure high-protein with meals. 3. Monitor appetite, meal intake, and acceptance/intake of ONS. 4. Please obtain an actual, current weight for this patient - only a stated weight was obtained on 12/1/21.   5. Monitor nutrition-related labs, bowel function, and weight trends. Nutrition Assessment:  patient is declining from a nutritional standpoint AEB poor appetite/po intake noted at this point in the admission and she remains at risk for further compromise d/t ongoing poor appetite/po intake, altered nutrition-related labs, and patient c/o constipation; will continue ADULT DIET; Regular diet order and will modify ONS to Ensure High-Protein with meals    Malnutrition Assessment:  Malnutrition Status: At risk for malnutrition   Context:  Acute Illness     Findings of the 6 clinical characteristics of malnutrition:  Energy Intake:  7 - 50% or less of estimated energy requirements for 5 or more days  Weight Loss:  Unable to assess (only a stated weight was obtained on 12/1/21)     Body Fat Loss:  Unable to assess (COVID-19 +)     Muscle Mass Loss:  Unable to assess (COVID-19 +)    Fluid Accumulation:  No significant fluid accumulation     Strength:  Not Performed    Estimated Daily Nutrient Needs:  Energy (kcal):  1472 - 1600 kcals based on 23-25 kcals/kg/CBW; Weight Used for Energy Requirements:  Current     Protein (g):  77 - 90 g protein based on 1.2-1.4 g/kg/CBW;  Weight Used for Protein Requirements:  Current        Fluid (ml/day):  1472 - 1600 ml; Method Used for Fluid Requirements:  1 ml/kcal      Nutrition Related Findings:  patient is A & O x 4; she has been refusing bipap; abdomen is flat, soft, and bowel sounds are active; patient c/o constipation; per flow sheet, last BM was on 12/9/21; patient has been consuming 0-25% of most meals and 1-75% of ONS with meals; po intake is inconsistent and varies greatly; Na, Ca, and Cl are low; AST is elevated; patient has MVI, vitamin D, pepcid, lexapro, levaquin, synthroid, metamucil, and seroquel ordered at this time      Wounds:  None       Current Nutrition Therapies:    ADULT DIET; Regular  ADULT ORAL NUTRITION SUPPLEMENT; Breakfast, Lunch, Dinner; Low Calorie/High Protein Oral Supplement    Anthropometric Measures:  · Height: 5' 4\" (162.6 cm)  · Current Body Weight: 140 lb (63.5 kg) (obtained on 12/1/21; stated weight)   · Admission Body Weight: 140 lb (63.5 kg) (obtained on 12/1/21; stated weight)    · Usual Body Weight: 146 lb (66.2 kg) (obtained on 6/18/21; unknown whether actual weight or stated/estimated weight)     · Ideal Body Weight: 120 lbs; % Ideal Body Weight 116.7 %   · BMI: 24  · BMI Categories: Normal Weight (BMI 22.0 to 24.9) age over 72       Nutrition Diagnosis:   · Inadequate oral intake related to inadequate protein-energy intake, impaired respiratory function, increase demand for energy/nutrients as evidenced by lab values, poor intake prior to admission, diarrhea      Nutrition Interventions:   Food and/or Nutrient Delivery:  Continue Current Diet, Modify Oral Nutrition Supplement  Nutrition Education/Counseling:  No recommendation at this time   Coordination of Nutrition Care:  Continue to monitor while inpatient, Interdisciplinary Rounds    Goals:  patient will consume 50% or greater of meals on ADULT DIET;  Regular diet order x 3 meals per day and she will consume 50% or greater of Ensure high-protein with meals       Nutrition Monitoring and Evaluation:   Behavioral-Environmental Outcomes:  None Identified   Food/Nutrient Intake Outcomes:  Food and Nutrient Intake, Supplement Intake  Physical Signs/Symptoms Outcomes:  Biochemical Data, GI Status, Meal Time Behavior, Skin, Weight     Discharge Planning:    Continue current diet     Electronically signed by Stephanie Garvin RD, LD on 12/10/21 at 9:30 AM EST    Contact: [phone]

## 2021-12-10 NOTE — PROGRESS NOTES
12/09/21 2344   NIV Type   Skin Assessment Clean, dry, & intact   Skin Protection for O2 Device Yes   NIV Started/Stopped On   Equipment Type V60   Mode Bilevel   Mask Type Full face mask   Mask Size Medium   Settings/Measurements   IPAP 14 cmH20   CPAP/EPAP 8 cmH2O   Rate Ordered 16   Resp 19   FiO2  100 %   Vt Exhaled 531 mL   Mask Leak (lpm) 19 lpm   Comfort Level Good   Using Accessory Muscles No   SpO2 91   Alarm Settings   Alarms On Y

## 2021-12-10 NOTE — PROGRESS NOTES
12/10/21 0410   NIV Type   Equipment Type v60   Mode Bilevel   Mask Type Full face mask   Mask Size Medium   Settings/Measurements   IPAP 14 cmH20   CPAP/EPAP 8 cmH2O   Rate Ordered 16   Resp 20   FiO2  100 %   Vt Exhaled 598 mL   Mask Leak (lpm) 11 lpm   Comfort Level Good   Using Accessory Muscles No   SpO2 94   Alarm Settings   Alarms On Y

## 2021-12-10 NOTE — PROGRESS NOTES
Pulmonary & Critical Care Medicine ICU Progress Note    CC: COVID-19    Events of Last 24 hours:   Used BiPAP on and off overnight - BiPAP overnight 12/8 cmH2O 100% FiO2   Vapotherm 40 % FiO2   Fever overnight         Vascular lines: IV: PICC 12/3    MV:       / / /FiO2 : 100 %  No results for input(s): PHART, [id number], PO2ART in the last 72 hours. IV:   sodium chloride Stopped (21 1632)       Vitals:  Blood pressure 123/76, pulse 98, temperature (P) 100.1 °F (37.8 °C), temperature source (P) Bladder, resp. rate 20, height 5' 4\" (1.626 m), weight 140 lb (63.5 kg), SpO2 91 %, not currently breastfeeding. On Vapotherm    Temp  Av.9 °F (37.7 °C)  Min: 98.6 °F (37 °C)  Max: 101 °F (38.3 °C)    Intake/Output Summary (Last 24 hours) at 12/10/2021 0703  Last data filed at 2021 1837  Gross per 24 hour   Intake 860 ml   Output 620 ml   Net 240 ml     PE:  General: ill appearing    Eyes: PERRL. No sclera icterus. No conjunctival injection. ENT: No discharge. Pharynx clear. Neck: Trachea midline. Normal thyroid. Resp: + accessory muscle use. Few crackles. No wheezing. Few rhonchi. No dullness on percussion. CV: Regular rate. Regular rhythm. No mumur or rub. No edema. GI: Non-tender. Non-distended. No masses. No organomegaly. Normal bowel sounds. No hernia. Skin: Warm and dry. No nodule on exposed extremities. No rash on exposed extremities. Lymph: No cervical LAD. No supraclavicular LAD. M/S: No cyanosis. No joint deformity. No clubbing. Neuro: AAOx3. Followed commands.    Psych: No agitation, no anxiety, affect is full       Scheduled Meds:   influenza virus vaccine  0.5 mL IntraMUSCular Prior to discharge    escitalopram  5 mg Oral Daily    QUEtiapine  12.5 mg Oral BID    psyllium  1 packet Oral Daily    levofloxacin  500 mg IntraVENous Q24H    lidocaine 1 % injection  5 mL IntraDERmal Once    dexamethasone  6 mg IntraVENous Q24H    vitamin D  50,000 Units Oral Weekly    famotidine  20 mg Oral BID    sodium chloride flush  5-40 mL IntraVENous 2 times per day    enoxaparin  30 mg SubCUTAneous BID    levothyroxine  50 mcg Oral Daily    liothyronine  5 mcg Oral Daily    therapeutic multivitamin-minerals  1 tablet Oral Daily       Data:  CBC:   Recent Labs     12/08/21  0600 12/10/21  0530   WBC 10.8 10.3   HGB 13.9 13.8   HCT 40.4 41.2   MCV 93.3 93.1    215     BMP:   Recent Labs     12/08/21  0600 12/10/21  0530   * 135*   K 3.7 3.7   CL 94* 96*   CO2 30 29   BUN 25* 16   CREATININE 0.6 0.6     LIVER PROFILE:   Recent Labs     12/08/21  0600 12/10/21  0530   AST 46* 47*   ALT 31 26   BILITOT 0.5 0.5   ALKPHOS 130* 130*       Microbiology:  12/1 [facility] NGTD    Imaging:  CTPA 12/6 imaging reviewed by me and showed  Diffuse bilateral airspace disease-worsening          ASSESSMENT:  · Acute hypoxemic respiratory failure -severe progressive life-threatening  · COVID-19 viral pneumonia  · ARDS  · Transminitis   · Electrolytes disorder   · Fever   · Anxiety/agitation   · Not vaccinated for COVID-19        PLAN:  Vapotherm for life-threatening acute hypoxemic respiratory failure and titrate to maintain SaO2 >92%  Bilevel non-invasive positive pressure ventilation per my orders- change 12/8   Precedex off   Levaquin D#5---> Zosyn   Supervised self proning   Droplet plus isolation  Dexamethasone 6 mg IV D#10---> 4 mg, monitor blood glucose closely  Tocilizumab 12/3/21. Post 2 doses of Baricitinib   Seroquel 12.5 PO Q12 hrs   Resumed Lexapro   Resumed Oxycodone PRN   DVT prophylaxis: Lovenox BID   MRSA prophylaxis: Bactroban  DNR CCA   Discussed with  daughters at the bedside today and multiple good questions were answered.             Total critical care time caring for this patient with life threatening, unstable organ failure, including direct patient contact, management of life support systems, review of data including imaging and labs, discussions with other team members and physicians is 31 minutes, excluding procedures.

## 2021-12-10 NOTE — PROGRESS NOTES
08:00 Pt awake a&o x4, assessment as charted , pt refusing bipap @ this time   09:40 Critical care completed @ bedside w/ Dr. Chris Reina, Daughter Giulia @ bedside.  Pt and Daughter updated by Dr. Chris Reina  13:00 Pt remains awake a&o x4, reassessment unchanged and as charted, pt self proning on and off this afternoon  15:30 Pt remains awake a&o x4, reassessment unchanged and as charted, pt remains on vapotherm   19:00 Handoff report given to night nurse Raina Moore, pt awake a&o x4, on bipap, pt stable @ handoff

## 2021-12-10 NOTE — PLAN OF CARE
Nutrition Problem #1: Inadequate oral intake  Intervention: Food and/or Nutrient Delivery: Continue Current Diet, Modify Oral Nutrition Supplement  Nutritional Goals: patient will consume 50% or greater of meals on ADULT DIET;  Regular diet order x 3 meals per day and she will consume 50% or greater of Ensure high-protein with meals

## 2021-12-11 LAB
A/G RATIO: 1.2 (ref 1.1–2.2)
ALBUMIN SERPL-MCNC: 2.8 G/DL (ref 3.4–5)
ALP BLD-CCNC: 125 U/L (ref 40–129)
ALT SERPL-CCNC: 20 U/L (ref 10–40)
ANION GAP SERPL CALCULATED.3IONS-SCNC: 9 MMOL/L (ref 3–16)
AST SERPL-CCNC: 48 U/L (ref 15–37)
BASOPHILS ABSOLUTE: 0.1 K/UL (ref 0–0.2)
BASOPHILS RELATIVE PERCENT: 0.6 %
BILIRUB SERPL-MCNC: 0.5 MG/DL (ref 0–1)
BUN BLDV-MCNC: 18 MG/DL (ref 7–20)
CALCIUM SERPL-MCNC: 8.1 MG/DL (ref 8.3–10.6)
CHLORIDE BLD-SCNC: 97 MMOL/L (ref 99–110)
CO2: 28 MMOL/L (ref 21–32)
CREAT SERPL-MCNC: 0.6 MG/DL (ref 0.6–1.2)
EOSINOPHILS ABSOLUTE: 0.1 K/UL (ref 0–0.6)
EOSINOPHILS RELATIVE PERCENT: 0.7 %
GFR AFRICAN AMERICAN: >60
GFR NON-AFRICAN AMERICAN: >60
GLUCOSE BLD-MCNC: 108 MG/DL (ref 70–99)
HCT VFR BLD CALC: 38.2 % (ref 36–48)
HEMOGLOBIN: 13.1 G/DL (ref 12–16)
LYMPHOCYTES ABSOLUTE: 0.8 K/UL (ref 1–5.1)
LYMPHOCYTES RELATIVE PERCENT: 8.2 %
MCH RBC QN AUTO: 31.8 PG (ref 26–34)
MCHC RBC AUTO-ENTMCNC: 34.2 G/DL (ref 31–36)
MCV RBC AUTO: 93.1 FL (ref 80–100)
MONOCYTES ABSOLUTE: 0.4 K/UL (ref 0–1.3)
MONOCYTES RELATIVE PERCENT: 3.8 %
NEUTROPHILS ABSOLUTE: 8.6 K/UL (ref 1.7–7.7)
NEUTROPHILS RELATIVE PERCENT: 86.7 %
PDW BLD-RTO: 13.5 % (ref 12.4–15.4)
PLATELET # BLD: 209 K/UL (ref 135–450)
PMV BLD AUTO: 8.5 FL (ref 5–10.5)
POTASSIUM REFLEX MAGNESIUM: 3.6 MMOL/L (ref 3.5–5.1)
RBC # BLD: 4.1 M/UL (ref 4–5.2)
SODIUM BLD-SCNC: 134 MMOL/L (ref 136–145)
TOTAL PROTEIN: 5.2 G/DL (ref 6.4–8.2)
WBC # BLD: 9.9 K/UL (ref 4–11)

## 2021-12-11 PROCEDURE — 36415 COLL VENOUS BLD VENIPUNCTURE: CPT

## 2021-12-11 PROCEDURE — 2580000003 HC RX 258: Performed by: INTERNAL MEDICINE

## 2021-12-11 PROCEDURE — 99233 SBSQ HOSP IP/OBS HIGH 50: CPT | Performed by: INTERNAL MEDICINE

## 2021-12-11 PROCEDURE — 6370000000 HC RX 637 (ALT 250 FOR IP): Performed by: INTERNAL MEDICINE

## 2021-12-11 PROCEDURE — 6360000002 HC RX W HCPCS: Performed by: INTERNAL MEDICINE

## 2021-12-11 PROCEDURE — 2000000000 HC ICU R&B

## 2021-12-11 PROCEDURE — 94761 N-INVAS EAR/PLS OXIMETRY MLT: CPT

## 2021-12-11 PROCEDURE — 85025 COMPLETE CBC W/AUTO DIFF WBC: CPT

## 2021-12-11 PROCEDURE — 94660 CPAP INITIATION&MGMT: CPT

## 2021-12-11 PROCEDURE — 99291 CRITICAL CARE FIRST HOUR: CPT | Performed by: INTERNAL MEDICINE

## 2021-12-11 PROCEDURE — 2700000000 HC OXYGEN THERAPY PER DAY

## 2021-12-11 PROCEDURE — 80053 COMPREHEN METABOLIC PANEL: CPT

## 2021-12-11 PROCEDURE — 6370000000 HC RX 637 (ALT 250 FOR IP): Performed by: NURSE PRACTITIONER

## 2021-12-11 RX ORDER — DEXAMETHASONE SODIUM PHOSPHATE 10 MG/ML
6 INJECTION, SOLUTION INTRAMUSCULAR; INTRAVENOUS EVERY 24 HOURS
Status: DISCONTINUED | OUTPATIENT
Start: 2021-12-12 | End: 2021-12-13

## 2021-12-11 RX ORDER — FUROSEMIDE 10 MG/ML
20 INJECTION INTRAMUSCULAR; INTRAVENOUS ONCE
Status: COMPLETED | OUTPATIENT
Start: 2021-12-11 | End: 2021-12-11

## 2021-12-11 RX ADMIN — FUROSEMIDE 20 MG: 10 INJECTION, SOLUTION INTRAVENOUS at 11:33

## 2021-12-11 RX ADMIN — PIPERACILLIN SODIUM AND TAZOBACTAM SODIUM 3375 MG: 3; .375 INJECTION, POWDER, LYOPHILIZED, FOR SOLUTION INTRAVENOUS at 01:19

## 2021-12-11 RX ADMIN — DEXAMETHASONE SODIUM PHOSPHATE 4 MG: 4 INJECTION, SOLUTION INTRAMUSCULAR; INTRAVENOUS at 08:00

## 2021-12-11 RX ADMIN — FAMOTIDINE 20 MG: 20 TABLET ORAL at 20:40

## 2021-12-11 RX ADMIN — BENZONATATE 200 MG: 100 CAPSULE ORAL at 14:42

## 2021-12-11 RX ADMIN — QUETIAPINE FUMARATE 12.5 MG: 25 TABLET ORAL at 20:39

## 2021-12-11 RX ADMIN — PSYLLIUM HUSK 1 PACKET: 3.4 POWDER ORAL at 10:05

## 2021-12-11 RX ADMIN — PIPERACILLIN SODIUM AND TAZOBACTAM SODIUM 3375 MG: 3; .375 INJECTION, POWDER, LYOPHILIZED, FOR SOLUTION INTRAVENOUS at 10:10

## 2021-12-11 RX ADMIN — FAMOTIDINE 20 MG: 20 TABLET ORAL at 09:39

## 2021-12-11 RX ADMIN — ESCITALOPRAM OXALATE 5 MG: 10 TABLET ORAL at 09:40

## 2021-12-11 RX ADMIN — ENOXAPARIN SODIUM 30 MG: 100 INJECTION SUBCUTANEOUS at 08:04

## 2021-12-11 RX ADMIN — SODIUM CHLORIDE, PRESERVATIVE FREE 10 ML: 5 INJECTION INTRAVENOUS at 20:50

## 2021-12-11 RX ADMIN — BENZONATATE 200 MG: 100 CAPSULE ORAL at 20:39

## 2021-12-11 RX ADMIN — LIOTHYRONINE SODIUM 5 MCG: 5 TABLET ORAL at 10:04

## 2021-12-11 RX ADMIN — SODIUM CHLORIDE, PRESERVATIVE FREE 10 ML: 5 INJECTION INTRAVENOUS at 10:04

## 2021-12-11 RX ADMIN — ENOXAPARIN SODIUM 30 MG: 100 INJECTION SUBCUTANEOUS at 20:38

## 2021-12-11 RX ADMIN — PIPERACILLIN SODIUM AND TAZOBACTAM SODIUM 3375 MG: 3; .375 INJECTION, POWDER, LYOPHILIZED, FOR SOLUTION INTRAVENOUS at 17:41

## 2021-12-11 RX ADMIN — QUETIAPINE FUMARATE 12.5 MG: 25 TABLET ORAL at 10:03

## 2021-12-11 RX ADMIN — BENZONATATE 200 MG: 100 CAPSULE ORAL at 09:39

## 2021-12-11 RX ADMIN — MULTIPLE VITAMINS W/ MINERALS TAB 1 TABLET: TAB at 09:39

## 2021-12-11 RX ADMIN — SODIUM CHLORIDE, PRESERVATIVE FREE 10 ML: 5 INJECTION INTRAVENOUS at 08:01

## 2021-12-11 ASSESSMENT — PAIN SCALES - GENERAL
PAINLEVEL_OUTOF10: 0

## 2021-12-11 NOTE — PROGRESS NOTES
Patient repositioned for comfort. Patient wanted to lay flat due to discomfort and headache. RN educated patient on importance of positioning for oxygenation. Patient agreeable lay side lying with pillow support. Currently on BiPap, oxygen saturation 92%.

## 2021-12-11 NOTE — PROGRESS NOTES
Pulmonary & Critical Care Medicine ICU Progress Note    CC: COVID-19    Events of Last 24 hours:   BiPAP on and off overnight - BiPAP overnight 12/8 cmH2O 100% FiO2   Vapotherm 40 % FiO2   No fever         Vascular lines: IV: PICC 12/3    MV:       / / /FiO2 : 100 %  No results for input(s): PHART, IMO4FOX, PO2ART in the last 72 hours. IV:   sodium chloride Stopped (21 1632)       Vitals:  Blood pressure (!) 119/55, pulse 101, temperature 99.4 °F (37.4 °C), temperature source Bladder, resp. rate 24, height 5' 4\" (1.626 m), weight 140 lb (63.5 kg), SpO2 (!) 85 %, not currently breastfeeding. On Vapotherm    Temp  Av.9 °F (37.7 °C)  Min: 98.1 °F (36.7 °C)  Max: 100.7 °F (38.2 °C)    Intake/Output Summary (Last 24 hours) at 2021 0720  Last data filed at 2021 0520  Gross per 24 hour   Intake 1340 ml   Output 1670 ml   Net -330 ml     PE:  General: ill appearing    Eyes: PERRL. No sclera icterus. No conjunctival injection. ENT: No discharge. Pharynx clear. Neck: Trachea midline. Normal thyroid. Resp: + accessory muscle use. BIlateral crackles. No wheezing. Few rhonchi. No dullness on percussion. CV: Regular rate. Regular rhythm. No mumur or rub. No edema. GI: Non-tender. Non-distended. No masses. No organomegaly. Normal bowel sounds. No hernia. Skin: Warm and dry. No nodule on exposed extremities. No rash on exposed extremities. Lymph: No cervical LAD. No supraclavicular LAD. M/S: No cyanosis. No joint deformity. No clubbing. Neuro: AAOx3. Followed commands.    Psych: No agitation, no anxiety, affect is full       Scheduled Meds:   piperacillin-tazobactam  3,375 mg IntraVENous Q8H    dexamethasone  4 mg IntraVENous Q24H    benzonatate  200 mg Oral TID    influenza virus vaccine  0.5 mL IntraMUSCular Prior to discharge    escitalopram  5 mg Oral Daily    QUEtiapine  12.5 mg Oral BID    psyllium  1 packet Oral Daily    lidocaine 1 % injection  5 mL IntraDERmal Once  vitamin D  50,000 Units Oral Weekly    famotidine  20 mg Oral BID    sodium chloride flush  5-40 mL IntraVENous 2 times per day    enoxaparin  30 mg SubCUTAneous BID    levothyroxine  50 mcg Oral Daily    liothyronine  5 mcg Oral Daily    therapeutic multivitamin-minerals  1 tablet Oral Daily       Data:  CBC:   Recent Labs     12/10/21  0530 12/11/21  0414   WBC 10.3 9.9   HGB 13.8 13.1   HCT 41.2 38.2   MCV 93.1 93.1    209     BMP:   Recent Labs     12/10/21  0530 12/11/21  0414   * 134*   K 3.7 3.6   CL 96* 97*   CO2 29 28   BUN 16 18   CREATININE 0.6 0.6     LIVER PROFILE:   Recent Labs     12/10/21  0530 12/11/21  0414   AST 47* 48*   ALT 26 20   BILITOT 0.5 0.5   ALKPHOS 130* 125       Microbiology:  12/1 [facility] NGTD  12/10 Resp   12/10 [facility]  12/10        Imaging:  CTPA 12/6 imaging reviewed by me and showed  Diffuse bilateral airspace disease-worsening          ASSESSMENT:  · Acute hypoxemic respiratory failure -severe progressive life-threatening  · COVID-19 viral pneumonia  · ARDS  · Transminitis   · Electrolytes disorder   · Fever   · Anxiety/agitation   · Not vaccinated for COVID-19        PLAN:  Vapotherm for life-threatening acute hypoxemic respiratory failure and titrate to maintain SaO2 >92%  Bilevel non-invasive positive pressure ventilation per my orders- change 12/8   Precedex off   Encouraged supervised self proning  Zosyn D#2. Completed Levaquin D#5  Droplet plus isolation  Dexamethasone 6 mg IV D#11---> 6 mg, monitor blood glucose closely  Tocilizumab 12/3/21.  Post 2 doses of Baricitinib   Seroquel 12.5 PO Q12 hrs   Lasix 20 IV x 1   Resumed Lexapro   Tessalon TID   Resumed Oxycodone PRN   DVT prophylaxis: Lovenox BID   MRSA prophylaxis: Bactroban  DNR CCA              Total critical care time caring for this patient with life threatening, unstable organ failure, including direct patient contact, management of life support systems, review of data including imaging and labs, discussions with other team members and physicians is 32 minutes, excluding procedures.

## 2021-12-11 NOTE — PROGRESS NOTES
12/10/21 2233   NIV Type   Skin Assessment Clean, dry, & intact   Skin Protection for O2 Device Yes   NIV Started/Stopped On   Equipment Type v60   Mode Bilevel   Mask Type Full face mask   Mask Size Medium   Settings/Measurements   IPAP 12 cmH20   CPAP/EPAP 8 cmH2O   Rate Ordered 16   Resp 24   FiO2  100 %   Vt Exhaled 654 mL   Mask Leak (lpm) 21 lpm   Comfort Level Good   Using Accessory Muscles No   SpO2 88   Alarm Settings   Alarms On Y

## 2021-12-11 NOTE — PROGRESS NOTES
FiO2 decraesed to 95%         12/11/21 1649   Oxygen Therapy/Pulse Ox   O2 Therapy Oxygen humidified   O2 Device Heated high flow cannula   O2 Flow Rate (L/min) 40 L/min   FiO2  95 %   Resp 22   SpO2 94 %

## 2021-12-11 NOTE — PROGRESS NOTES
Patient on BiPap, resting comfortably with eyes closed at this time. Lung sounds diminished bilaterally. Oxygen saturation between 93-95% on BiPap. Breathing is regular and shallow. Patient left side lying with pillow support. Responding appropriately to questions and following commands. Peripheral IV dressing C/D/I. Central line dressing C/D/I. Lee in place and draining appropriately. See flowsheet and eMar for additional documentation.     Electronically signed by Kerri Kehr, RN on 12/11/2021 at 8:12 AM    Vitals:    12/11/21 0800   BP: (!) 116/58   Pulse: 97   Resp: 22   Temp: 100 °F (37.8 °C)   SpO2: 93%

## 2021-12-11 NOTE — PROGRESS NOTES
Patient A/Ox4, follows commands and responds appropriately. Currently on Vapotherm 100%/40L with NRB, patient self proning with oxygen saturation of %. Patient repositioned for comfort. Denies any needs at this time. No s/s of distress.     Electronically signed by Adeline Bettencourt RN on 12/11/2021 at 12:57 PM    Vitals:    12/11/21 1200   BP: (!) 137/114   Pulse: 97   Resp: 17   Temp: 100 °F (37.8 °C)   SpO2: (!) 89%

## 2021-12-11 NOTE — PROGRESS NOTES
Patient placed back on bipap at previous settings. Oxygen saturations now 92%. Not tolerating vapotherm without additional non rebreather  Explained again importance of using bipap and keeping mask on for extended period of time. Nods in understanding.

## 2021-12-11 NOTE — PROGRESS NOTES
1930:[facility], care assumed, assessment completed see flow sheet or adult overview for specifics. Patient on Vapotherm and non-rebreather with oxygen saturations 85-91%. Alert and oriented x 4. Discussed plan for shift. States \"I do not want to be intubated and I do not like how everyone who comes in here is preaching it at me. \" Refusing bipap initially. 2240:Placed back on bipap per RT, slow to increase o2 saturations. Currently at 88%. Will continue to monitor closely.

## 2021-12-11 NOTE — PROGRESS NOTES
Rounding completed with Dr. Chris Reina. POC, labs, lines and assessment reviewed. Patient to lay supine when possible.  Medication changes made per eMar.

## 2021-12-12 LAB
A/G RATIO: 1.1 (ref 1.1–2.2)
ALBUMIN SERPL-MCNC: 2.8 G/DL (ref 3.4–5)
ALP BLD-CCNC: 111 U/L (ref 40–129)
ALT SERPL-CCNC: 16 U/L (ref 10–40)
ANION GAP SERPL CALCULATED.3IONS-SCNC: 9 MMOL/L (ref 3–16)
AST SERPL-CCNC: 36 U/L (ref 15–37)
BASOPHILS ABSOLUTE: 0 K/UL (ref 0–0.2)
BASOPHILS ABSOLUTE: 0 K/UL (ref 0–0.2)
BASOPHILS RELATIVE PERCENT: 0.3 %
BASOPHILS RELATIVE PERCENT: 0.5 %
BILIRUB SERPL-MCNC: 0.5 MG/DL (ref 0–1)
BUN BLDV-MCNC: 15 MG/DL (ref 7–20)
CALCIUM SERPL-MCNC: 8.2 MG/DL (ref 8.3–10.6)
CHLORIDE BLD-SCNC: 99 MMOL/L (ref 99–110)
CO2: 31 MMOL/L (ref 21–32)
CREAT SERPL-MCNC: <0.5 MG/DL (ref 0.6–1.2)
CULTURE, RESPIRATORY: NORMAL
EOSINOPHILS ABSOLUTE: 0 K/UL (ref 0–0.6)
EOSINOPHILS ABSOLUTE: 0.1 K/UL (ref 0–0.6)
EOSINOPHILS RELATIVE PERCENT: 0.3 %
EOSINOPHILS RELATIVE PERCENT: 0.6 %
GFR AFRICAN AMERICAN: >60
GFR NON-AFRICAN AMERICAN: >60
GLUCOSE BLD-MCNC: 103 MG/DL (ref 70–99)
GRAM STAIN RESULT: NORMAL
HCT VFR BLD CALC: 31.6 % (ref 36–48)
HCT VFR BLD CALC: 36.2 % (ref 36–48)
HEMOGLOBIN: 10.6 G/DL (ref 12–16)
HEMOGLOBIN: 12.2 G/DL (ref 12–16)
LYMPHOCYTES ABSOLUTE: 0.6 K/UL (ref 1–5.1)
LYMPHOCYTES ABSOLUTE: 0.8 K/UL (ref 1–5.1)
LYMPHOCYTES RELATIVE PERCENT: 8.5 %
LYMPHOCYTES RELATIVE PERCENT: 9 %
MAGNESIUM: 2.3 MG/DL (ref 1.8–2.4)
MCH RBC QN AUTO: 31.3 PG (ref 26–34)
MCH RBC QN AUTO: 31.4 PG (ref 26–34)
MCHC RBC AUTO-ENTMCNC: 33.5 G/DL (ref 31–36)
MCHC RBC AUTO-ENTMCNC: 33.7 G/DL (ref 31–36)
MCV RBC AUTO: 93 FL (ref 80–100)
MCV RBC AUTO: 93.5 FL (ref 80–100)
MONOCYTES ABSOLUTE: 0.4 K/UL (ref 0–1.3)
MONOCYTES ABSOLUTE: 0.6 K/UL (ref 0–1.3)
MONOCYTES RELATIVE PERCENT: 5.5 %
MONOCYTES RELATIVE PERCENT: 6.8 %
NEUTROPHILS ABSOLUTE: 6.5 K/UL (ref 1.7–7.7)
NEUTROPHILS ABSOLUTE: 7 K/UL (ref 1.7–7.7)
NEUTROPHILS RELATIVE PERCENT: 83.1 %
NEUTROPHILS RELATIVE PERCENT: 85.4 %
PDW BLD-RTO: 13.6 % (ref 12.4–15.4)
PDW BLD-RTO: 13.6 % (ref 12.4–15.4)
PLATELET # BLD: 219 K/UL (ref 135–450)
PLATELET # BLD: 249 K/UL (ref 135–450)
PMV BLD AUTO: 8.6 FL (ref 5–10.5)
PMV BLD AUTO: 8.7 FL (ref 5–10.5)
POTASSIUM REFLEX MAGNESIUM: 3.2 MMOL/L (ref 3.5–5.1)
RBC # BLD: 3.38 M/UL (ref 4–5.2)
RBC # BLD: 3.89 M/UL (ref 4–5.2)
SODIUM BLD-SCNC: 139 MMOL/L (ref 136–145)
TOTAL PROTEIN: 5.3 G/DL (ref 6.4–8.2)
WBC # BLD: 7.6 K/UL (ref 4–11)
WBC # BLD: 8.4 K/UL (ref 4–11)

## 2021-12-12 PROCEDURE — 2700000000 HC OXYGEN THERAPY PER DAY

## 2021-12-12 PROCEDURE — 6370000000 HC RX 637 (ALT 250 FOR IP): Performed by: INTERNAL MEDICINE

## 2021-12-12 PROCEDURE — 97112 NEUROMUSCULAR REEDUCATION: CPT

## 2021-12-12 PROCEDURE — 6360000002 HC RX W HCPCS: Performed by: INTERNAL MEDICINE

## 2021-12-12 PROCEDURE — 97530 THERAPEUTIC ACTIVITIES: CPT

## 2021-12-12 PROCEDURE — 6370000000 HC RX 637 (ALT 250 FOR IP): Performed by: NURSE PRACTITIONER

## 2021-12-12 PROCEDURE — 2000000000 HC ICU R&B

## 2021-12-12 PROCEDURE — 97535 SELF CARE MNGMENT TRAINING: CPT

## 2021-12-12 PROCEDURE — 36415 COLL VENOUS BLD VENIPUNCTURE: CPT

## 2021-12-12 PROCEDURE — 94660 CPAP INITIATION&MGMT: CPT

## 2021-12-12 PROCEDURE — 99233 SBSQ HOSP IP/OBS HIGH 50: CPT | Performed by: INTERNAL MEDICINE

## 2021-12-12 PROCEDURE — 99291 CRITICAL CARE FIRST HOUR: CPT | Performed by: INTERNAL MEDICINE

## 2021-12-12 PROCEDURE — 80053 COMPREHEN METABOLIC PANEL: CPT

## 2021-12-12 PROCEDURE — 83735 ASSAY OF MAGNESIUM: CPT

## 2021-12-12 PROCEDURE — 2580000003 HC RX 258: Performed by: INTERNAL MEDICINE

## 2021-12-12 PROCEDURE — 85025 COMPLETE CBC W/AUTO DIFF WBC: CPT

## 2021-12-12 PROCEDURE — 94761 N-INVAS EAR/PLS OXIMETRY MLT: CPT

## 2021-12-12 RX ORDER — ALBUTEROL SULFATE 90 UG/1
2 AEROSOL, METERED RESPIRATORY (INHALATION) EVERY 4 HOURS PRN
Status: DISCONTINUED | OUTPATIENT
Start: 2021-12-12 | End: 2021-12-20

## 2021-12-12 RX ORDER — POTASSIUM CHLORIDE 29.8 MG/ML
40 INJECTION INTRAVENOUS ONCE
Status: COMPLETED | OUTPATIENT
Start: 2021-12-12 | End: 2021-12-12

## 2021-12-12 RX ADMIN — ESCITALOPRAM OXALATE 5 MG: 10 TABLET ORAL at 08:24

## 2021-12-12 RX ADMIN — PIPERACILLIN SODIUM AND TAZOBACTAM SODIUM 3375 MG: 3; .375 INJECTION, POWDER, LYOPHILIZED, FOR SOLUTION INTRAVENOUS at 17:45

## 2021-12-12 RX ADMIN — PIPERACILLIN SODIUM AND TAZOBACTAM SODIUM 3375 MG: 3; .375 INJECTION, POWDER, LYOPHILIZED, FOR SOLUTION INTRAVENOUS at 02:23

## 2021-12-12 RX ADMIN — SODIUM CHLORIDE, PRESERVATIVE FREE 10 ML: 5 INJECTION INTRAVENOUS at 08:25

## 2021-12-12 RX ADMIN — FAMOTIDINE 20 MG: 20 TABLET ORAL at 08:23

## 2021-12-12 RX ADMIN — BENZONATATE 200 MG: 100 CAPSULE ORAL at 08:23

## 2021-12-12 RX ADMIN — ENOXAPARIN SODIUM 30 MG: 100 INJECTION SUBCUTANEOUS at 20:58

## 2021-12-12 RX ADMIN — PSYLLIUM HUSK 1 PACKET: 3.4 POWDER ORAL at 08:23

## 2021-12-12 RX ADMIN — BENZONATATE 200 MG: 100 CAPSULE ORAL at 20:57

## 2021-12-12 RX ADMIN — QUETIAPINE FUMARATE 12.5 MG: 25 TABLET ORAL at 08:24

## 2021-12-12 RX ADMIN — ENOXAPARIN SODIUM 30 MG: 100 INJECTION SUBCUTANEOUS at 08:23

## 2021-12-12 RX ADMIN — POTASSIUM CHLORIDE 40 MEQ: 29.8 INJECTION, SOLUTION INTRAVENOUS at 10:18

## 2021-12-12 RX ADMIN — DEXAMETHASONE SODIUM PHOSPHATE 6 MG: 10 INJECTION INTRAMUSCULAR; INTRAVENOUS at 08:23

## 2021-12-12 RX ADMIN — PIPERACILLIN SODIUM AND TAZOBACTAM SODIUM 3375 MG: 3; .375 INJECTION, POWDER, LYOPHILIZED, FOR SOLUTION INTRAVENOUS at 10:12

## 2021-12-12 RX ADMIN — MULTIPLE VITAMINS W/ MINERALS TAB 1 TABLET: TAB at 08:23

## 2021-12-12 RX ADMIN — BENZONATATE 200 MG: 100 CAPSULE ORAL at 15:17

## 2021-12-12 RX ADMIN — SODIUM CHLORIDE, PRESERVATIVE FREE 10 ML: 5 INJECTION INTRAVENOUS at 21:00

## 2021-12-12 RX ADMIN — LEVOTHYROXINE SODIUM 50 MCG: 0.03 TABLET ORAL at 08:04

## 2021-12-12 RX ADMIN — GUAIFENESIN AND DEXTROMETHORPHAN 5 ML: 100; 10 SYRUP ORAL at 16:54

## 2021-12-12 RX ADMIN — QUETIAPINE FUMARATE 12.5 MG: 25 TABLET ORAL at 20:55

## 2021-12-12 RX ADMIN — FAMOTIDINE 20 MG: 20 TABLET ORAL at 20:56

## 2021-12-12 RX ADMIN — LIOTHYRONINE SODIUM 5 MCG: 5 TABLET ORAL at 08:23

## 2021-12-12 ASSESSMENT — PAIN SCALES - GENERAL
PAINLEVEL_OUTOF10: 0

## 2021-12-12 NOTE — PROGRESS NOTES
Hospitalist Progress Note      PCP: Lex Yates MD    Date of Admission: 12/1/2021    Subjective: using BIPAP overnight, now on vapotherm    Medications:  Reviewed    Infusion Medications    sodium chloride Stopped (12/03/21 1632)     Scheduled Medications    dexamethasone  6 mg IntraVENous Q24H    piperacillin-tazobactam  3,375 mg IntraVENous Q8H    benzonatate  200 mg Oral TID    influenza virus vaccine  0.5 mL IntraMUSCular Prior to discharge    escitalopram  5 mg Oral Daily    QUEtiapine  12.5 mg Oral BID    psyllium  1 packet Oral Daily    lidocaine 1 % injection  5 mL IntraDERmal Once    vitamin D  50,000 Units Oral Weekly    famotidine  20 mg Oral BID    sodium chloride flush  5-40 mL IntraVENous 2 times per day    enoxaparin  30 mg SubCUTAneous BID    levothyroxine  50 mcg Oral Daily    liothyronine  5 mcg Oral Daily    therapeutic multivitamin-minerals  1 tablet Oral Daily     PRN Meds: sodium chloride, oxyCODONE, bismuth subsalicylate, melatonin, sodium chloride flush, sodium chloride, ondansetron **OR** ondansetron, polyethylene glycol, acetaminophen **OR** acetaminophen, guaiFENesin-dextromethorphan, albuterol sulfate HFA      Intake/Output Summary (Last 24 hours) at 12/12/2021 1351  Last data filed at 12/11/2021 1456  Gross per 24 hour   Intake --   Output 200 ml   Net -200 ml       Physical Exam Performed:    /74   Pulse 89   Temp 99.4 °F (37.4 °C) (Bladder)   Resp 18   Ht 5' 4\" (1.626 m)   Wt 140 lb (63.5 kg)   SpO2 99%   BMI 24.03 kg/m²     General: Awake and alert. Ill-appearing. In moderate distress. Seen in prone position  Eyes: PERRL. No sclera icterus. No conjunctiva injected. ENT: No discharge. Pharynx clear. Neck: Trachea midline. Normal thyroid. Resp: + accessory muscle use. + crackles. No wheezing. No rhonchi. No dullness on percussion. CV: Regular rate. Regular rhythm. No mumur or rub. No edema. No JVD. Palpable pedal pulses.    GI: Assessment/Plan:    Active Hospital Problems    Diagnosis     Fever [R50.9]     Acute hypoxemic respiratory failure (HCC) [J96.01]     Agitation [R45.1]     Transaminitis [R74.01]     Electrolyte disorder [E87.8]     ARDS (adult respiratory distress syndrome) (HCC) [J80]     Acquired hypothyroidism [E03.9]     Pneumonia due to COVID-19 virus [U07.1, J12.82]     Acute respiratory failure with hypoxia (HCC) [J96.01]     Acute respiratory disease due to COVID-19 virus [U07.1, J06.9]     Hypokalemia [E87.6]     Elevated LFTs [R79.89]     Leukopenia [D72.819]     Depression [F32. A]          #Acute respiratory failure with hypoxemia due to COVID-19. Patient admitted to hospital.  Pulmonary consultation. Supplemental oxygen. Continuous pulse ox and telemetry. On Vapotherm. CODE STATUS changed to full code. Continue with supervised self proning. Patient agreeable with intubation if required. Wants to postpone intubation as much as possible. Currently on Vapotherm 100% 40 L--> 70%  40 L --->100% 40 L --> 90% 40 L. Precedex when on BiPAP. encourage self proning.     #COVID-19 pneumonia. Work-up consistent with COVID-19 pneumonia. She is hypoxic. She meets criteria for treatment. Started on Decadron 12 mg a day. Presently on 6 mg of Decadron day . Monitor sugars closely. Status post Tocilizumab 12/3/2021. Post 2 doses of baricitinib     #Acquired hypothyroidism. Continue home medication.     #Depression. Stable  Resumed Lexapro  Seroquel 12.5 mg p.o. every 12 hours.     #Lovenox 30 mg subcu twice daily     #Transaminitis due to COVID-19        Diet: ADULT DIET; Regular  ADULT ORAL NUTRITION SUPPLEMENT; Breakfast, Lunch, Dinner;  Low Calorie/High Protein Oral Supplement  Code Status: DNR-CCA    PT/OT Eval Status: ordered    Dispo - monitor in ICU    Haydee Painting MD

## 2021-12-12 NOTE — PROGRESS NOTES
Inpatient Physical Therapy Daily Treatment Note    Unit: ICU  Date:  12/12/2021  Patient Name:    Juan Borrego  Admitting diagnosis:  Acute respiratory disease due to COVID-19 virus [U07.1, J06.9]  Acute respiratory failure due to COVID-19 [facility]) [U07.1, J96.00]  Admit Date:  12/1/2021  Precautions/Restrictions:  Fall risk, Lines -IV, Supplemental O2 (Vapotherm 40Lpm, 100% FiO2 with activity) and Lee catheter, Telemetry, Continuous pulse oximetry, Isolation Precautions: Droplet Plus - COVID and ICU monitoring     12/12/21: Of note, pt coming off BiPAP and was prone upon arrival. RN set vapotherm to 100% FiO2 for session. Nonrebreather turned on and next to pt if needed. Pt did NOT require non rebreather during session. Discharge Recommendations: ARU/IRF (inpatient rehab facility)   DME needs for discharge: defer to facility       Therapy recommendation for EMS Transport: can transport by wheelchair    Therapy recommendations for staff:   Assist of 1 with use of gait belt and hand held assist for all transfers to/from chair    History of Present Illness: Per Dr. Renay Dsouza progress note 157: \"80year-old female admitted to hospital with COVID-19. Eamon Guzmán has a past medical history of depression.  She tested positive for Covid about 10 days ago. Eamon Guzmán apparently was taking ivermectin at home. Eamon Guzmán came to the emergency room and had a saturation of 70%.  She was admitted to hospital and then transferred the ICU.  She is presently on Vapotherm.  Pulmonologist discussed with patient and family and apparently she wanted to be a DNI. Ivania Munoz came and told me later on that she was considering intubation.  She is unvaccinated. 12/3-was 100% Vapotherm during rounds.  She was down to 80% Vapotherm in the prone position but had to be made supine for PICC line insertion.  Plan is to reprone her and try to decrease FiO2.  She is on Precedex  12/4-patient has been manually self proning.  FiO2 down to 80%.  On 40 L/min.  Refused BiPAP overnight   12/5- used Bipap last night. On 80% vapo therm. Feeling about the same. 12/6  Currently on Vapotherm 100% 40 L   BiPAP overnight  12/7  Now on Vapotherm 70% FiO2 40 L/min  Used BiPAP overnight  She is  self proning\"    Home Health S4 Level Recommendation: Level 1 Standard  AM-PAC Mobility Score   AM-PAC Inpatient Mobility Raw Score : 14       Treatment Time:  0464-7630  Treatment number: 2  Timed Code Treatment Minutes: 40 minutes  Total Treatment Minutes:  40  minutes    Cognition    A&O x4   Able to follow 2 step commands    Subjective  Patient lying supine in bed with RN present initially   Pt agreeable to this PT tx. Pain   No  Location:   Rating:    NA/10  Pain Medicine Status: No request made     Bed Mobility   Supine to Sit:    SBA with HOB elevated  Sit to Supine:   SBA  Rolling:   Not Tested  Scooting:   SBA    Transfer Training     Sit to stand:   Min A  and 2 persons  Stand to sit:   Min A  and 2 persons  Bed to Chair:   Not Tested with use of N/A    Gait Training gait deferred due to focuse on standing tolerance with appropriate O2 saturation; pt ambulated 0 ft.    Distance:      0 ft  Deviations (firm surface/linoleum):  N/A  Assistive Device Used:    N/A  Level of Assist:    Not Tested  Comment:     Stair Training deferred, pt unsafe/not appropriate to complete stairs at this time  # of Steps:   N/A  Level of Assist:  Not Tested  UE Support:  NA  Assistive Device:  N/A  Pattern:   N/A  Comments:     Therapeutic Exercise all completed bilaterally unless indicated  Ankle Pumps x 5 reps, 2 sets  LAQ: x 5 reps, 2 sets    Balance  Sitting:  Good ; Supervision  Comments: ~ 15 min at EOB, intermittent bouts with no UE support   ~ 3 min with dynamic UE movement with brushing teeth at [facility], SBA    Standing: Fair ; Min A   Comments: Initial instability in LEs and posterior lean, improved with continued standing   ~ 2 min with bilateral hand held assist, tactile cueing provided for posture correction    Pre-Gait Training:  Weight shifting: x 10, no buckling noted in B knees  Weight shifting with heel lift: x 10, no buckling noted in B knees    Patient Education      Role of PT, POC, Discharge recommendations, safety awareness, energy conservation, pacing activity and HEP. Positioning Needs       Pt in bed, no alarm needed, positioned in proper neutral alignment and pressure relief provided. Call light provided and all needs within reach    Activity Tolerance   Pt completed therapy session with Dizziness noted with positional changes, improved with maintaining position. RN removed pt from BiPAP and placed pt on Vapotherm (40Lpm, 100%) for therapy session   BP (mmHg) HR (bpm) SpO2 (%) Comments   Supine, at rest  90s >92% Pt at ~88-91% on 40Lpm, 90% FiO2, RN increased to 100% FiO2 for therapy session   Seated at /87 100-110 91-95% Mild c/o of dizziness, improved in < 2 min at EOB   Standing  105-115 bpm 90-93% No c/o    End of session 135/83 96 96% No c/o       Other  N/A    Assessment :  Patient demonstrates significant improvement of functional activity tolerance with progression to OOB mobility. Pt able to tolerate standing ~ 2 min with no knee buckling and progress with pre-gait training activities. Pt with initial posterior lean with standing, able to correct with verbal and tactile cues. Recommending ARU/IRF/Inpatient Rehab Facility upon discharge as patient functioning well below baseline, demonstrates good rehab potential and unable to return home due to inability to negotiate stairs to enter home/bedroom/bathroom and home environment not conducive to patient recovery. Pt very motivated to participate and return to her baseline level of function. Pt reports supportive family members. Goals (all goals ongoing unless otherwise indicated)  To be met in 3 visits:  1). Independent with LE Ex x 10 reps     To be met in 6 visits:  1). Supine to/from sit: Supervision  2).   Sit to/from stand: Supervision  3). Bed to chair: Supervision  4). Gait: Ambulate 25 ft.   with  SBA and use of LRAD (least restrictive assistive device)  5). Tolerate B LE exercises 3 sets of 10-15 reps  6). Ascend/descend 2 steps with SBA with use of hand rail unilateral and LRAD (least restrictive assistive device)    Plan   With pt's improved activity tolerance for participation in PT, upgrade frequency to 3-5x/wk. Signature: Elly Suggs PT, DPT #[id number]    If patient discharges from this facility prior to next visit, this note will serve as the Discharge Summary.

## 2021-12-12 NOTE — PROGRESS NOTES
Pulmonary & Critical Care Medicine ICU Progress Note    CC: COVID-19    Events of Last 24 hours:   BiPAP all night 12/ cmH2O 90% FiO2   Vapotherm 35 LPM 90% FiO2   No fever         Vascular lines: IV: PICC 12/3    MV:       / / /FiO2 : 90 %  No results for input(s): PHART, GJM1JAX, PO2ART in the last 72 hours. IV:   sodium chloride Stopped (21 1632)       Vitals:  Blood pressure (!) 108/57, pulse 84, temperature 98.5 °F (36.9 °C), temperature source Bladder, resp. rate 20, height 5' 4\" (1.626 m), weight 140 lb (63.5 kg), SpO2 95 %, not currently breastfeeding. On Vapotherm    Temp  Av.4 °F (37.4 °C)  Min: 98.5 °F (36.9 °C)  Max: 100 °F (37.8 °C)    Intake/Output Summary (Last 24 hours) at 2021 0720  Last data filed at 2021 1456  Gross per 24 hour   Intake 333 ml   Output 1900 ml   Net -1567 ml     PE:  General: ill appearing    Eyes: PERRL. No sclera icterus. No conjunctival injection. ENT: No discharge. Pharynx clear. Neck: Trachea midline. Normal thyroid. Resp: + accessory muscle use. Few crackles. No wheezing. Few rhonchi. No dullness on percussion. CV: Regular rate. Regular rhythm. No mumur or rub. No edema. GI: Non-tender. Non-distended. No masses. No organomegaly. Normal bowel sounds. No hernia. Skin: Warm and dry. No nodule on exposed extremities. No rash on exposed extremities. Lymph: No cervical LAD. No supraclavicular LAD. M/S: No cyanosis. No joint deformity. No clubbing. Neuro: AAOx3. Followed commands.    Psych: No agitation, no anxiety, affect is full       Scheduled Meds:   dexamethasone  6 mg IntraVENous Q24H    piperacillin-tazobactam  3,375 mg IntraVENous Q8H    benzonatate  200 mg Oral TID    influenza virus vaccine  0.5 mL IntraMUSCular Prior to discharge    escitalopram  5 mg Oral Daily    QUEtiapine  12.5 mg Oral BID    psyllium  1 packet Oral Daily    lidocaine 1 % injection  5 mL IntraDERmal Once    vitamin D  50,000 Units Oral Weekly    famotidine  20 mg Oral BID    sodium chloride flush  5-40 mL IntraVENous 2 times per day    enoxaparin  30 mg SubCUTAneous BID    levothyroxine  50 mcg Oral Daily    liothyronine  5 mcg Oral Daily    therapeutic multivitamin-minerals  1 tablet Oral Daily       Data:  CBC:   Recent Labs     12/11/21  0414 12/12/21  0420 12/12/21  0645   WBC 9.9 7.6 8.4   HGB 13.1 10.6* 12.2   HCT 38.2 31.6* 36.2   MCV 93.1 93.5 93.0    219 249     BMP:   Recent Labs     12/10/21  0530 12/11/21  0414 12/12/21  0645   * 134* 139   K 3.7 3.6 3.2*   CL 96* 97* 99   CO2 29 28 31   BUN 16 18 15   CREATININE 0.6 0.6 <0.5*     LIVER PROFILE:   Recent Labs     12/10/21  0530 12/11/21  0414 12/12/21  0645   AST 47* 48* 36   ALT 26 20 16   BILITOT 0.5 0.5 0.5   ALKPHOS 130* 125 111       Microbiology:  12/1 BC NGTD  12/10 Resp NGTD   12/10 BC NGTD   12/10  NGTD       Imaging:  CTPA 12/6 imaging reviewed by me and showed  Diffuse bilateral airspace disease-worsening          ASSESSMENT:  · Acute hypoxemic respiratory failure -severe progressive life-threatening  · COVID-19 viral pneumonia  · ARDS  · Transminitis   · Electrolytes disorder   · Fever - better   · Anxiety/agitation   · Not vaccinated for COVID-19        PLAN:  Vapotherm for life-threatening acute hypoxemic respiratory failure and titrate to maintain SaO2 >92%  Bilevel non-invasive positive pressure ventilation per my orders- change 12/8   Precedex off   Encouraged supervised self proning  Zosyn D#3. Completed Levaquin D#5  Droplet plus isolation  Dexamethasone IV D#12---> 6 mg, monitor blood glucose closely  Tocilizumab 12/3/21. Post 2 doses of Baricitinib   Seroquel 12.5 PO Q12 hrs   Resumed Lexapro   Tessalon TID   KCL  Resumed Oxycodone PRN   DVT prophylaxis: Lovenox BID   MRSA prophylaxis: Bactroban  DNR CCA   I discussed care plan with family at the bedside and multiple good questions were answered.                  Total critical care time caring for this patient with life threatening, unstable organ failure, including direct patient contact, management of life support systems, review of data including imaging and labs, discussions with other team members and physicians is 31 minutes, excluding procedures.

## 2021-12-12 NOTE — PROGRESS NOTES
Rounding completed with Dr. Julio Hammond. POC, labs, lines and assessment reviewed. See changes to medication per eMar. Patient to continue to use BiPap at night.

## 2021-12-12 NOTE — PROGRESS NOTES
complaints, modifying factors or associated symptoms  12/3-was 100% Vapotherm during rounds.  She was down to 80% Vapotherm in the prone position but had to be made supine for PICC line insertion.  Plan is to reprone her and try to decrease FiO2.  She is on Precedex  12/4-patient has been manually self proning.  FiO2 down to 80%.  On 40 L/min.  Refused BiPAP overnight   12/5- used Bipap last night. On 80% vapo therm. Feeling about the same. 12/6  Currently on Vapotherm 100% 40 L   BiPAP overnight  12/7  Now on Vapotherm 70% FiO2 40 L/min  Used BiPAP overnight  She is  self proning\"    Home Health S4 Level Recommendation:  NA    AM-PAC Score: AM-PAC Inpatient Daily Activity Raw Score: 14  Pt scored a 14/24 on the AM-PAC ADL Inpatient form. Current research shows that an AM-PAC score of 17 or less is typically not associated with a discharge to the patient's home setting. Subjective:  Pt supine in bed upon therapist arrival. Pt agreeable to work with therapy this date. Pain   No  Rating: NA  Location: None reported throughout   Pain Medicine Status: No request made      Cognition:    A&O Person, Place, Time and Situation   Able to follow 2 step commands, consistently. Balance:  Functional Sitting Balance: WNL   Comments: Good - completed ADLs, see below. Pt sat at EOB for 15 minutes. Functional Standing Balance:Impaired   Comments: B HHA - Pt stood for 2 minutes. Bed mobility:    Supine to sit:   SBA  Sit to supine:   SBA  Rolling:    Not Tested  Scooting in sitting:  CGA  Scooting to head of bed:   CGA   Bridging:   Yes    Transfers:    Sit to stand:  minimal assistance (25%) and 2 people  Stand to sit:  minimal assistance (25%) and 2 people    Pt completed weight shifting and heel raises in standing.    Bed to chair:   Not Tested  Standard toilet: Not Tested  Bed to [facility]:  Not Tested    Activities of Daily Living:   UB Dressing:   minimal assistance (25%)  LB Dressing:    total assistance (100%)  UB Bathing:  Not Tested  LB Bathing:  Not Tested  Feeding:  SBA  Grooming:   SBA  To complete oral care seated at EOB  Toileting:  Not Tested    Activity Tolerance:   Pt completed therapy session with Dizziness noted with transition to EOB. Pt reports \"feeling different but Ok\" with standing. BP (mmHg) HR (bpm) SpO2 (%)   Seated at /87 100-110 91-95%   Standing  105-115 90-92   End of session 135/83 96 96%      Therapeutic Exercise:   N/A    Patient Education:   Role of OT  Recommendations for DC    Positioning Needs: In bed, call light and needs in reach. Family Present:  No    Assessment: Pt with good progress this date. Pt able to tolerate increased activity with improved O2 response. Pt appears to be ready for functional transfer practice at next session. Pt may benefit from continued skilled occupational therapy while in the hospital and upon in order to progress to a safe and more indpt level of functioning. Pt reports affect improved and now sees the ability for progress. Pt highly motivated and excited. GOALS  To be met in 3 Visits:  1). Bed to toilet/BSC: Min A with AD as needed      To be met in 5 Visits:  1). Supine to/from Sit:             Modified Independent  2). Upper Body Bathing:         Supervision  3). Lower Body Bathing:         Min A   4). Upper Body Dressing:       Supervision  5). Lower Body Dressing:       Min A   6).  Pt to demonstrate UE exs x 15 reps with minimal cues       Plan: cont with POC      Anshu Sellers, MOT, OTR/L   [id number]       If patient discharges from this facility prior to next visit, this note will serve as the Discharge Summary

## 2021-12-12 NOTE — PROGRESS NOTES
Shift report given to [facility]. Patient care handed off in stable condition at this time.

## 2021-12-12 NOTE — PROGRESS NOTES
FiO2 decreased to 90% via 40lpm vapotherm         12/12/21 0756   Oxygen Therapy/Pulse Ox   O2 Therapy Oxygen humidified   O2 Device Heated high flow cannula   O2 Flow Rate (L/min) 40 L/min   FiO2  90 %   Resp 20   SpO2 94 %

## 2021-12-12 NOTE — PROGRESS NOTES
12/12/21 1206   NIV Type   Equipment Type V60   Mode Bilevel   Mask Type Total face   Mask Size Medium   Settings/Measurements   IPAP 12 cmH20   CPAP/EPAP 8 cmH2O   Rate Ordered 16   Resp 18   FiO2  85 %   Vt Exhaled 505 mL   Minute Volume 9.1 Liters   Comfort Level Good   Using Accessory Muscles No   SpO2 99   Breath Sounds   Right Upper Lobe Diminished   Right Middle Lobe Diminished   Right Lower Lobe Diminished   Left Upper Lobe Diminished   Left Lower Lobe Diminished   Alarm Settings   Alarms On Y

## 2021-12-12 NOTE — PROGRESS NOTES
12/11/21 2129   NIV Type   Skin Assessment Clean, dry, & intact   Skin Protection for O2 Device N/A   NIV Started/Stopped On   Equipment Type V60   Mode Bilevel   Mask Type Total face   Mask Size Medium   Settings/Measurements   IPAP 12 cmH20   CPAP/EPAP 8 cmH2O   Rate Ordered 16   Resp (!) 32   FiO2  100 %   Vt Exhaled 517 mL   Mask Leak (lpm) 21 lpm   Comfort Level Good   Using Accessory Muscles No   SpO2 91   Alarm Settings   Alarms On Y

## 2021-12-12 NOTE — PROGRESS NOTES
Patient on Vapotherm, 90%/40L. Lung sounds diminished bilaterally. Oxygen saturation between 90-93%. Patient semi fowlers with pillow support. Responding appropriately to questions and following commands.     Peripheral IV dressing C/D/I. Central line dressing C/D/I.     Lee in place and draining appropriately.     See flowsheet and eMar for additional documentation.     Vitals:    12/12/21 0800   BP: (!) 132/94   Pulse: 96   Resp: 20   Temp: 99.4 °F (37.4 °C)   SpO2: (!) 88%

## 2021-12-12 NOTE — PROGRESS NOTES
12/12/21 0249   NIV Type   Equipment Type V60   Mode Bilevel   Mask Type Total face   Mask Size Medium   Settings/Measurements   IPAP 12 cmH20   CPAP/EPAP 8 cmH2O   Rate Ordered 16   Resp 19   FiO2  90 %   Vt Exhaled 458 mL   Mask Leak (lpm) 21 lpm   Comfort Level Good   Using Accessory Muscles No   SpO2 95   Alarm Settings   Alarms On Y

## 2021-12-12 NOTE — PROGRESS NOTES
RT Inhaler-Nebulizer Bronchodilator Protocol Note    There is a bronchodilator order in the chart from a provider indicating to follow the RT Bronchodilator Protocol and there is an Initiate RT Inhaler-Nebulizer Bronchodilator Protocol order as well (see protocol at bottom of note). CXR Findings:  No results found. The findings from the last RT Protocol Assessment were as follows:   History Pulmonary Disease: (P) None or smoker <15 pack years  Respiratory Pattern: (P) Dyspnea on exertion or RR 21-25 bpm  Breath Sounds: (P) Clear breath sounds  Cough: (P) Strong, spontaneous, non-productive  Indication for Bronchodilator Therapy: Decreased or absent breath sounds  Bronchodilator Assessment Score: (P) 2    Aerosolized bronchodilator medication orders have been revised according to the RT Inhaler-Nebulizer Bronchodilator Protocol below. Respiratory Therapist to perform RT Therapy Protocol Assessment initially then follow the protocol. Repeat RT Therapy Protocol Assessment PRN for score 0-3 or on second treatment, BID, and PRN for scores above 3. No Indications - adjust the frequency to every 6 hours PRN wheezing or bronchospasm, if no treatments needed after 48 hours then discontinue using Per Protocol order mode. If indication present, adjust the RT bronchodilator orders based on the Bronchodilator Assessment Score as indicated below. Use Inhaler orders unless patient has one or more of the following: on home nebulizer, not able to hold breath for 10 seconds, is not alert and oriented, cannot activate and use MDI correctly, or respiratory rate 25 breaths per minute or more, then use the equivalent nebulizer order(s) with same Frequency and PRN reasons based on the score. If a patient is on this medication at home then do not decrease Frequency below that used at home.     0-3 - enter or revise RT bronchodilator order(s) to equivalent RT Bronchodilator order with Frequency of every 4 hours PRN for wheezing or increased work of breathing using Per Protocol order mode. 4-6 - enter or revise RT Bronchodilator order(s) to two equivalent RT bronchodilator orders with one order with BID Frequency and one order with Frequency of every 4 hours PRN wheezing or increased work of breathing using Per Protocol order mode. 7-10 - enter or revise RT Bronchodilator order(s) to two equivalent RT bronchodilator orders with one order with TID Frequency and one order with Frequency of every 4 hours PRN wheezing or increased work of breathing using Per Protocol order mode. 11-13 - enter or revise RT Bronchodilator order(s) to one equivalent RT bronchodilator order with QID Frequency and an Albuterol order with Frequency of every 4 hours PRN wheezing or increased work of breathing using Per Protocol order mode. Greater than 13 - enter or revise RT Bronchodilator order(s) to one equivalent RT bronchodilator order with every 4 hours Frequency and an Albuterol order with Frequency of every 2 hours PRN wheezing or increased work of breathing using Per Protocol order mode. RT to enter RT Home Evaluation for COPD & MDI Assessment order using Per Protocol order mode.     Electronically signed by Mable Villalta RCP on 12/12/2021 at 2:41 PM

## 2021-12-12 NOTE — PROGRESS NOTES
12/11/21 2233   NIV Type   Equipment Type V60   Mode Bilevel   Mask Type Total face   Mask Size Medium   Settings/Measurements   IPAP 12 cmH20   CPAP/EPAP 8 cmH2O   Rate Ordered 16   Resp 18   FiO2  90 %   Vt Exhaled 572 mL   Mask Leak (lpm) 29 lpm   Comfort Level Good   Using Accessory Muscles No   SpO2 95   Alarm Settings   Alarms On Y

## 2021-12-13 LAB
A/G RATIO: 1.1 (ref 1.1–2.2)
ALBUMIN SERPL-MCNC: 2.8 G/DL (ref 3.4–5)
ALP BLD-CCNC: 109 U/L (ref 40–129)
ALT SERPL-CCNC: 24 U/L (ref 10–40)
ANION GAP SERPL CALCULATED.3IONS-SCNC: 9 MMOL/L (ref 3–16)
AST SERPL-CCNC: 38 U/L (ref 15–37)
BASOPHILS ABSOLUTE: 0 K/UL (ref 0–0.2)
BASOPHILS RELATIVE PERCENT: 0.2 %
BILIRUB SERPL-MCNC: 0.4 MG/DL (ref 0–1)
BUN BLDV-MCNC: 14 MG/DL (ref 7–20)
CALCIUM SERPL-MCNC: 8.3 MG/DL (ref 8.3–10.6)
CHLORIDE BLD-SCNC: 103 MMOL/L (ref 99–110)
CO2: 28 MMOL/L (ref 21–32)
CREAT SERPL-MCNC: <0.5 MG/DL (ref 0.6–1.2)
EOSINOPHILS ABSOLUTE: 0 K/UL (ref 0–0.6)
EOSINOPHILS RELATIVE PERCENT: 0.2 %
GFR AFRICAN AMERICAN: >60
GFR NON-AFRICAN AMERICAN: >60
GLUCOSE BLD-MCNC: 101 MG/DL (ref 70–99)
GLUCOSE BLD-MCNC: 133 MG/DL (ref 70–99)
HCT VFR BLD CALC: 35.6 % (ref 36–48)
HEMOGLOBIN: 12.1 G/DL (ref 12–16)
LYMPHOCYTES ABSOLUTE: 1 K/UL (ref 1–5.1)
LYMPHOCYTES RELATIVE PERCENT: 10.4 %
MAGNESIUM: 2.3 MG/DL (ref 1.8–2.4)
MCH RBC QN AUTO: 31.8 PG (ref 26–34)
MCHC RBC AUTO-ENTMCNC: 33.9 G/DL (ref 31–36)
MCV RBC AUTO: 93.7 FL (ref 80–100)
MONOCYTES ABSOLUTE: 0.7 K/UL (ref 0–1.3)
MONOCYTES RELATIVE PERCENT: 7 %
NEUTROPHILS ABSOLUTE: 7.9 K/UL (ref 1.7–7.7)
NEUTROPHILS RELATIVE PERCENT: 82.2 %
PDW BLD-RTO: 13.6 % (ref 12.4–15.4)
PERFORMED ON: ABNORMAL
PLATELET # BLD: 325 K/UL (ref 135–450)
PMV BLD AUTO: 8.3 FL (ref 5–10.5)
POTASSIUM REFLEX MAGNESIUM: 3.4 MMOL/L (ref 3.5–5.1)
RBC # BLD: 3.8 M/UL (ref 4–5.2)
SODIUM BLD-SCNC: 140 MMOL/L (ref 136–145)
TOTAL PROTEIN: 5.4 G/DL (ref 6.4–8.2)
WBC # BLD: 9.7 K/UL (ref 4–11)

## 2021-12-13 PROCEDURE — 2580000003 HC RX 258: Performed by: INTERNAL MEDICINE

## 2021-12-13 PROCEDURE — 6360000002 HC RX W HCPCS: Performed by: INTERNAL MEDICINE

## 2021-12-13 PROCEDURE — 94761 N-INVAS EAR/PLS OXIMETRY MLT: CPT

## 2021-12-13 PROCEDURE — 36415 COLL VENOUS BLD VENIPUNCTURE: CPT

## 2021-12-13 PROCEDURE — 99291 CRITICAL CARE FIRST HOUR: CPT | Performed by: INTERNAL MEDICINE

## 2021-12-13 PROCEDURE — 2000000000 HC ICU R&B

## 2021-12-13 PROCEDURE — 83735 ASSAY OF MAGNESIUM: CPT

## 2021-12-13 PROCEDURE — 80053 COMPREHEN METABOLIC PANEL: CPT

## 2021-12-13 PROCEDURE — 94660 CPAP INITIATION&MGMT: CPT

## 2021-12-13 PROCEDURE — 2580000003 HC RX 258

## 2021-12-13 PROCEDURE — 6370000000 HC RX 637 (ALT 250 FOR IP): Performed by: INTERNAL MEDICINE

## 2021-12-13 PROCEDURE — 97530 THERAPEUTIC ACTIVITIES: CPT

## 2021-12-13 PROCEDURE — 2700000000 HC OXYGEN THERAPY PER DAY

## 2021-12-13 PROCEDURE — 6370000000 HC RX 637 (ALT 250 FOR IP): Performed by: NURSE PRACTITIONER

## 2021-12-13 PROCEDURE — 97116 GAIT TRAINING THERAPY: CPT

## 2021-12-13 PROCEDURE — 99233 SBSQ HOSP IP/OBS HIGH 50: CPT | Performed by: INTERNAL MEDICINE

## 2021-12-13 PROCEDURE — 85025 COMPLETE CBC W/AUTO DIFF WBC: CPT

## 2021-12-13 RX ORDER — POTASSIUM CHLORIDE 750 MG/1
40 TABLET, EXTENDED RELEASE ORAL ONCE
Status: COMPLETED | OUTPATIENT
Start: 2021-12-13 | End: 2021-12-13

## 2021-12-13 RX ORDER — DEXAMETHASONE SODIUM PHOSPHATE 4 MG/ML
3 INJECTION, SOLUTION INTRA-ARTICULAR; INTRALESIONAL; INTRAMUSCULAR; INTRAVENOUS; SOFT TISSUE EVERY 24 HOURS
Status: COMPLETED | OUTPATIENT
Start: 2021-12-14 | End: 2021-12-16

## 2021-12-13 RX ADMIN — PIPERACILLIN SODIUM AND TAZOBACTAM SODIUM 3375 MG: 3; .375 INJECTION, POWDER, LYOPHILIZED, FOR SOLUTION INTRAVENOUS at 17:11

## 2021-12-13 RX ADMIN — DEXAMETHASONE SODIUM PHOSPHATE 6 MG: 10 INJECTION INTRAMUSCULAR; INTRAVENOUS at 08:49

## 2021-12-13 RX ADMIN — BENZONATATE 200 MG: 100 CAPSULE ORAL at 08:46

## 2021-12-13 RX ADMIN — QUETIAPINE FUMARATE 12.5 MG: 25 TABLET ORAL at 08:48

## 2021-12-13 RX ADMIN — FAMOTIDINE 20 MG: 20 TABLET ORAL at 08:46

## 2021-12-13 RX ADMIN — PSYLLIUM HUSK 1 PACKET: 3.4 POWDER ORAL at 08:46

## 2021-12-13 RX ADMIN — WATER 20 ML: 1 INJECTION INTRAMUSCULAR; INTRAVENOUS; SUBCUTANEOUS at 13:54

## 2021-12-13 RX ADMIN — PIPERACILLIN SODIUM AND TAZOBACTAM SODIUM 3375 MG: 3; .375 INJECTION, POWDER, LYOPHILIZED, FOR SOLUTION INTRAVENOUS at 02:14

## 2021-12-13 RX ADMIN — SODIUM CHLORIDE, PRESERVATIVE FREE 10 ML: 5 INJECTION INTRAVENOUS at 08:50

## 2021-12-13 RX ADMIN — SODIUM CHLORIDE, PRESERVATIVE FREE 10 ML: 5 INJECTION INTRAVENOUS at 20:43

## 2021-12-13 RX ADMIN — MULTIPLE VITAMINS W/ MINERALS TAB 1 TABLET: TAB at 08:48

## 2021-12-13 RX ADMIN — ALTEPLASE 1 MG: 2.2 INJECTION, POWDER, LYOPHILIZED, FOR SOLUTION INTRAVENOUS at 13:54

## 2021-12-13 RX ADMIN — LEVOTHYROXINE SODIUM 50 MCG: 0.03 TABLET ORAL at 08:48

## 2021-12-13 RX ADMIN — BENZONATATE 200 MG: 100 CAPSULE ORAL at 13:52

## 2021-12-13 RX ADMIN — ENOXAPARIN SODIUM 30 MG: 100 INJECTION SUBCUTANEOUS at 20:43

## 2021-12-13 RX ADMIN — ALTEPLASE 1 MG: 2.2 INJECTION, POWDER, LYOPHILIZED, FOR SOLUTION INTRAVENOUS at 13:53

## 2021-12-13 RX ADMIN — BENZONATATE 200 MG: 100 CAPSULE ORAL at 20:43

## 2021-12-13 RX ADMIN — PIPERACILLIN SODIUM AND TAZOBACTAM SODIUM 3375 MG: 3; .375 INJECTION, POWDER, LYOPHILIZED, FOR SOLUTION INTRAVENOUS at 08:55

## 2021-12-13 RX ADMIN — LIOTHYRONINE SODIUM 5 MCG: 5 TABLET ORAL at 08:54

## 2021-12-13 RX ADMIN — FAMOTIDINE 20 MG: 20 TABLET ORAL at 20:42

## 2021-12-13 RX ADMIN — ESCITALOPRAM OXALATE 5 MG: 10 TABLET ORAL at 08:47

## 2021-12-13 RX ADMIN — ENOXAPARIN SODIUM 30 MG: 100 INJECTION SUBCUTANEOUS at 08:46

## 2021-12-13 RX ADMIN — QUETIAPINE FUMARATE 12.5 MG: 25 TABLET ORAL at 20:42

## 2021-12-13 RX ADMIN — POTASSIUM CHLORIDE 40 MEQ: 750 TABLET, EXTENDED RELEASE ORAL at 10:20

## 2021-12-13 ASSESSMENT — PAIN SCALES - GENERAL
PAINLEVEL_OUTOF10: 0

## 2021-12-13 NOTE — PROGRESS NOTES
Pt requesting to work w/ PT/OT. This RN spoke to OT who states they will try to see pt today. Given pt hypoxia w/ minimal exertion this RN will defer to PT/OT.

## 2021-12-13 NOTE — PROGRESS NOTES
Inpatient Physical Therapy Daily Treatment Note    Unit: ICU  Date:  12/13/2021  Patient Name:    Fidencio Plasencia  Admitting diagnosis:  Acute respiratory disease due to COVID-19 virus [U07.1, J06.9]  Acute respiratory failure due to COVID-19 [facility]) [U07.1, J96.00]  Admit Date:  12/1/2021  Precautions/Restrictions:  Fall risk, Lines -IV, Supplemental O2 (Vapotherm 30Lpm, 70% FiO2) and Lee catheter, Telemetry, Continuous pulse oximetry, Isolation Precautions: Droplet Plus - COVID and ICU monitoring         Discharge Recommendations: ARU/IRF (inpatient rehab facility)   DME needs for discharge: defer to facility       Therapy recommendation for EMS Transport: can transport by wheelchair    Therapy recommendations for staff:   Assist of 1 with use of gait belt and hand held assist for all transfers to/from chair    History of Present Illness: Per Dr. Tanesha Louis progress note 157: \"80year-old female admitted to hospital with COVID-19. Stefano Dunn has a past medical history of depression.  She tested positive for Covid about 10 days ago. Stefano Dunn apparently was taking ivermectin at home. Stefano Dunn came to the emergency room and had a saturation of 70%.  She was admitted to hospital and then transferred the ICU.  She is presently on Vapotherm.  Pulmonologist discussed with patient and family and apparently she wanted to be a DNI. Jose R Jane came and told me later on that she was considering intubation.  She is unvaccinated. 12/3-was 100% Vapotherm during rounds.  She was down to 80% Vapotherm in the prone position but had to be made supine for PICC line insertion.  Plan is to reprone her and try to decrease FiO2.  She is on Precedex  12/4-patient has been manually self proning.  FiO2 down to 80%.  On 40 L/min.  Refused BiPAP overnight   12/5- used Bipap last night. On 80% vapo therm. Feeling about the same.    12/6  Currently on Vapotherm 100% 40 L   BiPAP overnight  12/7  Now on Vapotherm 70% FiO2 40 L/min  Used BiPAP overnight  She is  self proning\"    Home Health S4 Level Recommendation: Level 1 Standard  AM-PAC Mobility Score   AM-PAC Inpatient Mobility Raw Score : 14       Treatment Time:  15:55-16:23  Treatment number: 2  Timed Code Treatment Minutes: 28 minutes  Total Treatment Minutes:  28  minutes    Cognition    A&O x4   Able to follow 2 step commands    Subjective  Patient lying supine in bed with no family present. Sleeping, easily aroused. Says she has been tired today. Eager to try to get to chair. Pt agreeable to this PT tx. Pain   No  Location:   Rating:    NA/10  Pain Medicine Status: No request made     Bed Mobility   Supine to Sit:    SBA with HOB elevated  Sit to Supine:   Not Tested  Rolling:   Not Tested  Scooting:   SBA    Transfer Training     Sit to stand:   CGA  Stand to sit:   CGA  Bed to Chair:   Min A x 1 + CGA x 1 with use of N/A. 2 therapists managing lines. Gait Training gait deferred due to focuse on standing tolerance with appropriate O2 saturation; pt ambulated 0 ft. Distance:      0 ft  Deviations (firm surface/linoleum):  N/A  Assistive Device Used:    N/A  Level of Assist:    Not Tested  Comment:     Stair Training deferred, pt unsafe/not appropriate to complete stairs at this time  # of Steps:   N/A  Level of Assist:  Not Tested  UE Support:  NA  Assistive Device:  N/A  Pattern:   N/A  Comments:     Therapeutic Exercise Pham deferred secondary to treatment focus on functional mobility  NA     Balance  Sitting:  Good ; Supervision  Comments: ~ 8 min at EOB, intermittent bouts with no UE support      Standing: Fair +; CGA  Comments: ~1 minute initial static stand at EOB for pre-gait activities before transferring to chair.       Pre-Gait Training:  Weight shifting: x 10, no buckling noted in B knees  Weight shifting with heel lift: x 5, no buckling noted in B knees    Patient Education      Role of PT, POC, Discharge recommendations, safety awareness, energy conservation, pacing activity and HEP. Positioning Needs       Pt up in chair, alarm set, positioned in proper neutral alignment and pressure relief provided. Call light provided and all needs within reach    Activity Tolerance   Pt completed therapy session with Dizziness noted with positional changes, improved with maintaining position. BP (mmHg) HR (bpm) SpO2 (%) Comments   Supine, at rest 120/67 90s >92%    Seated at /95 105-110 79%    -->89% c/o of dizziness. RT to room, increased FiO2 to 80%, 40Lpm   After bed>chair transfer  99 89% No c/o       Other  N/A    Assessment :  Patient demonstrates continued improvement of functional activity tolerance with progression to bed>chair transfer today. Improved sitting and standing balance today. Pt transferred with the walker at [facility] and light, steady cuing for sequencing. Recommending ARU/IRF/Inpatient Rehab Facility upon discharge as patient functioning well below baseline, demonstrates good rehab potential and unable to return home due to inability to negotiate stairs to enter home/bedroom/bathroom and home environment not conducive to patient recovery. Pt very motivated to participate and return to her baseline level of function. Pt reports supportive family members. Goals (all goals ongoing unless otherwise indicated)  To be met in 3 visits:  1). Independent with LE Ex x 10 reps     To be met in 6 visits:  1). Supine to/from sit: Supervision  2). Sit to/from stand: Supervision  3). Bed to chair: Supervision  4). Gait: Ambulate 25 ft.   with  SBA and use of LRAD (least restrictive assistive device)  5). Tolerate B LE exercises 3 sets of 10-15 reps  6). Ascend/descend 2 steps with SBA with use of hand rail unilateral and LRAD (least restrictive assistive device)    Plan   3-5x/wk. Signature: Willie Palumbo, PT, DPT      If patient discharges from this facility prior to next visit, this note will serve as the Discharge Summary.

## 2021-12-13 NOTE — PROGRESS NOTES
Shift report given to [facility]. Patient care handed off in stable condition at this time.

## 2021-12-13 NOTE — PROGRESS NOTES
12/12/21 1116   NIV Type   NIV Started/Stopped On   Equipment Type V60   Mode Bilevel   Mask Type Total face   Mask Size Medium   Settings/Measurements   IPAP 12 cmH20   CPAP/EPAP 8 cmH2O   Rate Ordered 16   Resp 23   FiO2  75 %   Vt Exhaled 538 mL   Mask Leak (lpm) 22 lpm   Comfort Level Good   Using Accessory Muscles No   SpO2 94   Alarm Settings   Alarms On Y

## 2021-12-13 NOTE — PROGRESS NOTES
CHANGED FIO2 TO 75%. SPO2  94%. I called and spoke to patient who states she spoke to [facility] pharmacy and they told her they could not give her 20 more Warfarin tablets at the moment because she already has enough for 85 days.    Patient informed that next time we refill her Warfarin it will be for a full 90 day supply.    Patient verbalized understanding.

## 2021-12-13 NOTE — PROGRESS NOTES
Hospitalist Progress Note      PCP: Nayan Kamara MD    Date of Admission: 12/1/2021     covid pna, unvaccinated  Worsened hypoxia   using BIPAP overnight, now on vapotherm    Subjective:   Ms Sandoval seen in ICU bed, on vapotherm   Feels improving slowly and o2 requirements better  Daughter at bedside    Medications:  Reviewed    Infusion Medications    sodium chloride Stopped (12/03/21 1632)     Scheduled Medications    dexamethasone  6 mg IntraVENous Q24H    piperacillin-tazobactam  3,375 mg IntraVENous Q8H    benzonatate  200 mg Oral TID    influenza virus vaccine  0.5 mL IntraMUSCular Prior to discharge    escitalopram  5 mg Oral Daily    QUEtiapine  12.5 mg Oral BID    psyllium  1 packet Oral Daily    lidocaine 1 % injection  5 mL IntraDERmal Once    vitamin D  50,000 Units Oral Weekly    famotidine  20 mg Oral BID    sodium chloride flush  5-40 mL IntraVENous 2 times per day    enoxaparin  30 mg SubCUTAneous BID    levothyroxine  50 mcg Oral Daily    liothyronine  5 mcg Oral Daily    therapeutic multivitamin-minerals  1 tablet Oral Daily     PRN Meds: albuterol sulfate HFA, sodium chloride, oxyCODONE, bismuth subsalicylate, melatonin, sodium chloride flush, sodium chloride, ondansetron **OR** ondansetron, polyethylene glycol, acetaminophen **OR** acetaminophen, guaiFENesin-dextromethorphan      Intake/Output Summary (Last 24 hours) at 12/13/2021 0718  Last data filed at 12/13/2021 0600  Gross per 24 hour   Intake --   Output 3025 ml   Net -3025 ml       Physical Exam Performed:    BP (!) 124/57   Pulse 74   Temp 98.5 °F (36.9 °C) (Bladder)   Resp 16   Ht 5' 4\" (1.626 m)   Wt 140 lb (63.5 kg)   SpO2 94%   BMI 24.03 kg/m²         General: thin  appearing elderly female, ill appearing    Awake, alert and oriented.  Appears to be not in any distress  Mucous Membranes:  Pink , anicteric  Neck: No JVD, no carotid bruit, no thyromegaly  Chest: diminished shawn with scattered crackles  Cardiovascular:  RRR S1S2 heard, no murmurs or gallops  Abdomen:  Soft, undistended, non tender, no organomegaly, BS present  Extremities: No edema or cyanosis. Distal pulses well felt  Neurological : grossly normal      Labs:   Recent Labs     12/12/21  0420 12/12/21  0645 12/13/21  0442   WBC 7.6 8.4 9.7   HGB 10.6* 12.2 12.1   HCT 31.6* 36.2 35.6*    249 325     Recent Labs     12/11/21  0414 12/12/21  0645 12/13/21  0442   * 139 140   K 3.6 3.2* 3.4*   CL 97* 99 103   CO2 28 31 28   BUN 18 15 14   CREATININE 0.6 <0.5* <0.5*   CALCIUM 8.1* 8.2* 8.3     Recent Labs     12/11/21 0414 12/12/21  0645 12/13/21  0442   AST 48* 36 38*   ALT 20 16 24   BILITOT 0.5 0.5 0.4   ALKPHOS 125 111 109     No results for input(s): INR in the last 72 hours. No results for input(s): Jaison Bares in the last 72 hours. Urinalysis:      Lab Results   Component Value Date    NITRU Negative 12/10/2021    BLOODU LARGE 12/10/2021    SPECGRAV 1.025 12/10/2021    GLUCOSEU Negative 12/10/2021       Radiology:  XR CHEST PORTABLE   Final Result   Moderate diffuse airspace disease compatible with multifocal pneumonia with   asymmetric involvement and consolidation left lower lobe. This could   represent COVID pneumonia with superimposed edema. IR PICC WO SQ PORT/PUMP > 5 YEARS   Final Result   Successful placement of PICC line. CT CHEST PULMONARY EMBOLISM W CONTRAST   Final Result   No evidence of pulmonary embolism. Extensive bilateral multifocal airspace disease compatible with multifocal   pneumonia, specifically COVID pneumonia. XR CHEST PORTABLE   Final Result   Multifocal peripherally located pulmonary opacities are seen, likely related   to COVID-19 pneumonia.                  Assessment/Plan:      COVID PNA  Acute Hypoxic Respiratory Failure/ARDS    - Pt is unvaccinated  - imaging with bilateral infiltrates   - does not wear O2 at baseline  - Admit to Med Surg, droplet + precautions, tele- progressive hypoxia needing ICU transfer and currently on vapotherm, alternating with bipap  - self proning as able - wishes to post pone vent support   - treated with  Decadron D#12, 3 mg ,  Status post Tocilizumab 12/3/2021. Post 2 doses of baricitinib PRN Robitussin  - lovenox bid     Possible sec bacterial infection  - risk for gram neg infection   - completed levaquin for 5 days, now on zosyn D4  - cx remain neg         Severe anxiety - was on precedex , now changed  To seroquel bid  Resumed lexapro       #Acquired hypothyroidism. Continue home medication.          dvt prophylaxis with lovenox bid  Start ambulation  Out of bed    Diet: ADULT DIET; Regular  ADULT ORAL NUTRITION SUPPLEMENT; Breakfast, Lunch, Dinner;  Low Calorie/High Protein Oral Supplement  Code Status: DNR-CCA    PT/OT Eval Status: ordered    Dispo - monitor in ICU    Isrrael Grimes MD, 12/13/2021 7:23 AM

## 2021-12-13 NOTE — PROGRESS NOTES
12/13/21 0241   NIV Type   Equipment Type v60   Mode Bilevel   Mask Type Total face   Mask Size Medium   Settings/Measurements   IPAP 12 cmH20   CPAP/EPAP 8 cmH2O   Rate Ordered 16   Resp 20   FiO2  75 %   Vt Exhaled 638 mL   Minute Volume 13.2 Liters   Mask Leak (lpm) 17 lpm   Comfort Level Good   Using Accessory Muscles No   SpO2 98   Patient Observation   Observations spo2 98% on 75% bipap

## 2021-12-13 NOTE — PLAN OF CARE
infection  Outcome: Ongoing     Problem: Nutrition Deficits  Goal: Optimize nutritional status  Outcome: Ongoing     Problem: Risk for Fluid Volume Deficit  Goal: Maintain normal heart rhythm  Outcome: Ongoing  Goal: Maintain absence of muscle cramping  Outcome: Ongoing  Goal: Maintain normal serum potassium, sodium, calcium, phosphorus, and pH  Outcome: Ongoing     Problem: Loneliness or Risk for Loneliness  Goal: Demonstrate positive use of time alone when socialization is not possible  Outcome: Ongoing     Problem: Fatigue  Goal: Verbalize increase energy and improved vitality  Outcome: Ongoing     Problem: Patient Education: Go to Patient Education Activity  Goal: Patient/Family Education  Outcome: Ongoing     Problem: Falls - Risk of:  Goal: Will remain free from falls  Description: Will remain free from falls  Outcome: Ongoing  Goal: Absence of physical injury  Description: Absence of physical injury  Outcome: Ongoing     Problem: Skin Integrity:  Goal: Will show no infection signs and symptoms  Description: Will show no infection signs and symptoms  Outcome: Ongoing  Goal: Absence of new skin breakdown  Description: Absence of new skin breakdown  Outcome: Ongoing     Problem: Nutrition  Goal: Optimal nutrition therapy  Outcome: Ongoing  Goal: Understanding of nutritional guidelines  Outcome: Ongoing   Patient is not able to demonstrated the ability to move from a reclining position to an upright position within the recliner.  however patient is alert, oriented and able to provide informed consent  High risk vesicant drug infusing:  __________    Multiple incompatible medications infusing:  __x_______    CVP Monitoring:  _________    Extremely difficult IV access challenge:  ______x__    Continued need for central line access:  _____x_____    Addressed with physician:  _____x___    RIGHT PATIENT, RIGHT TIME, RIGHT LINE

## 2021-12-13 NOTE — PROGRESS NOTES
12/13/21 0811   Oxygen Therapy/Pulse Ox   O2 Therapy Oxygen humidified   O2 Device Heated high flow cannula   O2 Flow Rate (L/min) 30 L/min   FiO2  65 %   Resp 19   SpO2 100 %   Pulse Oximeter Device Mode Continuous   Pulse Oximeter Device Location Finger

## 2021-12-13 NOTE — CARE COORDINATION
INTERDISCIPLINARY PLAN OF CARE CONFERENCE    Date/Time: 12/13/2021 10:17 AM  Completed by: Melchor Arriaga RN, Case Management      Patient Name:  Arthur Butler  YOB: 1950  Admitting Diagnosis: Acute respiratory disease due to COVID-19 virus [U07.1, J06.9]  Acute respiratory failure due to COVID-19 ([address] [U07.1, J96.00]     Admit Date/Time:  12/1/2021  4:32 AM    Chart reviewed. Interdisciplinary team contacted or reviewed plan related to patient progress and discharge plans. Disciplines included Case Management, Nursing, and Dietitian. Current Status:inpt,ICU LOC  PT/OT recommendation for discharge plan of care: tbd    Expected D/C Disposition:  tbd    Discharge Plan Comments: Reviewed chart. Pt in ICU cont on Vapotherm,C-19+. Pt from home alone, IPTA. PT rec ARU. Following.     Home O2 in place on admit: No  Pt informed of need to bring portable home O2 tank on day of discharge for nursing to connect prior to leaving:  Not Indicated  Verbalized agreement/Understanding:  Not Indicated

## 2021-12-13 NOTE — PROGRESS NOTES
Occupational Therapy Daily Treatment Note    Unit: ICU  Date:  12/13/2021  Patient Name:    Travis Soares  Admitting diagnosis:  Acute respiratory disease due to COVID-19 virus [U07.1, J06.9]  Acute respiratory failure due to COVID-19 [facility]) [U07.1, J96.00]  Admit Date:  12/1/2021  Precautions/Restrictions:   Fall risk, Lines -IV, Supplemental O2 (Vapotherm 30Lpm, 70% FiO2) and Lee catheter, Telemetry, Continuous pulse oximetry, Isolation Precautions: Droplet Plus - COVID and ICU monitoring        Treatment Time:  3001-5448   Treatment number:  3  Total Treatment Time:   30 minutes    Patient Goals for Session:  \" I want to get to the chair \"      Discharge Recommendations: ARU/IRF  DME needs for discharge: defer to facility       Therapy recommendations for staff:  Assist of 1 with use of gait belt and RW for all transfers to/from BSC/chair    History of Present Illness: Veronika Taylor unvaccinated 80 y.o. female  With Vital signs of /65   Pulse 94   Temp 99.4 °F (37.4 °C) (Oral)   Resp 22   Ht 5' 4\" (1.626 m)   Wt 140 lb (63.5 kg)   SpO2 94%   BMI 24.03 kg/m²  who presents to the ED with a complaint of shortness of breath.  Patient seen and evaluated in room 2.  Patient was diagnosed with Covid 2 weeks ago with a positive Covid test.  Since then she has been doing fine until the this evening when she started to get more short of breath.  She says she tested her pulse oximetry level and it was found to be in the seventies.  But got progressively better.  Upon arrival here in the emergency department her pulse oximetry reading is 94% on a nonrebreather mask.  She denies any chest pain no leg swelling.  Patient was treated with 1 DuoNeb on the way into the emergency department by EMS.  No other complaints, modifying factors or associated symptoms  12/3-was 100% Vapotherm during rounds.  She was down to 80% Vapotherm in the prone position but had to be made supine for PICC line insertion.  Plan is to reprone her and try to decrease FiO2.  She is on Precedex  12/4-patient has been manually self proning.  FiO2 down to 80%.  On 40 L/min.  Refused BiPAP overnight   12/5- used Bipap last night. On 80% vapo therm. Feeling about the same. 12/6  Currently on Vapotherm 100% 40 L   BiPAP overnight  12/7  Now on Vapotherm 70% FiO2 40 L/min  Used BiPAP overnight  She is  self proning\"    Home Health S4 Level Recommendation:  NA    AM-PAC Score: AM-PAC Inpatient Daily Activity Raw Score: 14  Pt scored a 14/24 on the AM-PAC ADL Inpatient form. Current research shows that an AM-PAC score of 17 or less is typically not associated with a discharge to the patient's home setting. Subjective:  Pt supine in bed upon therapist arrival. Pt agreeable to work with therapy this date. Pain   No  Rating: NA  Location: None reported throughout   Pain Medicine Status: No request made      Cognition:    A&O Person, Place, Time and Situation   Able to follow 2 step commands, consistently. Balance:  Functional Sitting Balance: WNL  Functional Standing Balance:Impaired     Bed mobility:    Supine to sit:   SBA  Sit to supine:   NT  Rolling:    Not Tested  Scooting in sitting:  SBA  Scooting to head of bed:        NT   Bridging:    NT    Transfers:    Sit to stand:  CGA  Stand to sit:  CGA      Bed to chair:   Min assist of one and CGA of second person   Standard toilet: Not Tested  Bed to [facility]:  Not Tested    Activities of Daily Living:   UB Dressing:   NT  LB Dressing:    Max assist to don socks   UB Bathing:  Not Tested  LB Bathing:  Not Tested  Feeding:  NT  Grooming:   IND brushing hair  Toileting:  Not Tested    Activity Tolerance:   Pt completed therapy session with dizziness .  When the pt dropped to 79% during therapy the resp therapist came in pts room and adjusted the pts vapo therm,        BP (mmHg) HR (bpm) SpO2 (%) Comments   Supine, at rest 120/67 90s >92%     Seated at /95 105-110 79%     -->89% c/o of dizziness. RT to room, increased FiO2 to 80%, 40Lpm   After bed>chair transfer   99 89% No c/o       Therapeutic Exercise:   N/A    Patient Education:   Role of OT  Recommendations for DC    Positioning Needs: In bed, call light and needs in reach. Family Present:  No    Assessment: Pt with good progress this date. Pt able to tolerate increased activity and transferred to the chair with min /CGA assist of two with RW . Pt was max assist to don socks and IND brushing hair, Pt may benefit from continued skilled occupational therapy while in the hospital and upon in order to progress to a safe and more indpt level of functioning. Pt reports affect improved and now sees the ability for progress. Pt highly motivated and excited. GOALS  To be met in 3 Visits:  1). Bed to toilet/BSC: Min A with AD as needed      To be met in 5 Visits:  1). Supine to/from Sit:             Modified Independent  2). Upper Body Bathing:         Supervision  3). Lower Body Bathing:         Min A   4). Upper Body Dressing:       Supervision  5). Lower Body Dressing:       Min A   6).  Pt to demonstrate UE exs x 15 reps with minimal cues       Plan: cont with POC      Carolynn Javier, MOT, OTR/L   [id number]       If patient discharges from this facility prior to next visit, this note will serve as the Discharge Summary

## 2021-12-13 NOTE — PROGRESS NOTES
Pulmonary & Critical Care Medicine ICU Progress Note    CC: COVID-19    Events of Last 24 hours:   Vapotherm 30 LPM 65% FiO2; more hypoxic with movement  Poor PO intake    Vascular lines: IV: PICC 12/3    MV:       / / /FiO2 : (S) 65 %  No results for input(s): PHART, QYH0DVJ, PO2ART in the last 72 hours. IV:   sodium chloride Stopped (12/03/21 1632)       Vitals:  Blood pressure (!) 124/57, pulse 74, temperature 98.5 °F (36.9 °C), temperature source Bladder, resp. rate 16, height 5' 4\" (1.626 m), weight 140 lb (63.5 kg), SpO2 94 %, not currently breastfeeding. On Vapotherm     Constitutional:  No acute distress. Eyes: PERRL. Conjunctivae anicteric. ENT: Normal nose. Normal tongue. Neck:  Trachea is midline. No thyroid tenderness. Respiratory: No accessory muscle usage. Decreased breath sounds. No wheezes. + rales. No Rhonchi. Cardiovascular: Normal S1S2. No digit clubbing. No digit cyanosis. No LE edema. Psychiatric: No anxiety or Agitation. Alert and Oriented to person, place and time.     Scheduled Meds:   dexamethasone  6 mg IntraVENous Q24H    piperacillin-tazobactam  3,375 mg IntraVENous Q8H    benzonatate  200 mg Oral TID    influenza virus vaccine  0.5 mL IntraMUSCular Prior to discharge    escitalopram  5 mg Oral Daily    QUEtiapine  12.5 mg Oral BID    psyllium  1 packet Oral Daily    lidocaine 1 % injection  5 mL IntraDERmal Once    vitamin D  50,000 Units Oral Weekly    famotidine  20 mg Oral BID    sodium chloride flush  5-40 mL IntraVENous 2 times per day    enoxaparin  30 mg SubCUTAneous BID    levothyroxine  50 mcg Oral Daily    liothyronine  5 mcg Oral Daily    therapeutic multivitamin-minerals  1 tablet Oral Daily       Data:  CBC:   Recent Labs     12/12/21  0420 12/12/21  0645 12/13/21 0442   WBC 7.6 8.4 9.7   HGB 10.6* 12.2 12.1   HCT 31.6* 36.2 35.6*   MCV 93.5 93.0 93.7    249 325     BMP:   Recent Labs     12/11/21  0414 12/12/21  0645 12/13/21 0442 * 139 140   K 3.6 3.2* 3.4*   CL 97* 99 103   CO2 28 31 28   BUN 18 15 14   CREATININE 0.6 <0.5* <0.5*     LIVER PROFILE:   Recent Labs     12/11/21  0414 12/12/21  0645 12/13/21  0442   AST 48* 36 38*   ALT 20 16 24   BILITOT 0.5 0.5 0.4   ALKPHOS 125 111 109     Microbiology:  12/1 BC NGTD  12/10 Resp NGTD   12/10 BC NGTD   12/10  NGTD     Imaging:  CTPA 12/6 imaging reviewed by me and showed  Diffuse bilateral airspace disease-worsening    ASSESSMENT:  · Acute hypoxemic respiratory failure -severe progressive life-threatening  · COVID-19 viral pneumonia  · ARDS  · Transminitis   · Electrolytes disorder   · Fever - better   · Anxiety/agitation - imprpoved  · Not vaccinated for COVID-19      PLAN:  Vapotherm for life-threatening acute hypoxemic respiratory failure and titrate to maintain SaO2 >92%  QHS/PRN Bipap  Zosyn D#4. Completed Levaquin D#5  Droplet plus isolation  Dexamethasone IV D#12; dec to 3mg, monitor blood glucose closely  Tocilizumab 12/3/21. Post 2 doses of Baricitinib   Seroquel 12.5 PO Q12 hrs   Tessalon TID   KCL  DVT prophylaxis: Lovenox BID  DNR CCA   Total critical care time caring for this patient with life threatening, unstable organ failure, including direct patient contact, management of life support systems, review of data including imaging and labs, discussions with other team members and physicians is 31 minutes, excluding procedures.

## 2021-12-13 NOTE — PROGRESS NOTES
Dr. Sandra Stallings @ bedside assessing pt for interdisciplinary rounds. All labs, lines, tubes, assessment, POC, K 3.4, desat w/ minimal exertion, and no blood return drom all lumens reviewed. Per MD pt to have 40 me po KCL, all lumens to be cathfloed and pt decadron to be decreased to 3 mg, see new orders.

## 2021-12-14 LAB
A/G RATIO: 1.2 (ref 1.1–2.2)
ALBUMIN SERPL-MCNC: 3 G/DL (ref 3.4–5)
ALP BLD-CCNC: 94 U/L (ref 40–129)
ALT SERPL-CCNC: 41 U/L (ref 10–40)
ANION GAP SERPL CALCULATED.3IONS-SCNC: 7 MMOL/L (ref 3–16)
AST SERPL-CCNC: 43 U/L (ref 15–37)
BASOPHILS ABSOLUTE: 0.1 K/UL (ref 0–0.2)
BASOPHILS RELATIVE PERCENT: 0.8 %
BILIRUB SERPL-MCNC: 0.4 MG/DL (ref 0–1)
BLOOD CULTURE, ROUTINE: NORMAL
BUN BLDV-MCNC: 14 MG/DL (ref 7–20)
CALCIUM SERPL-MCNC: 8.5 MG/DL (ref 8.3–10.6)
CHLORIDE BLD-SCNC: 102 MMOL/L (ref 99–110)
CO2: 30 MMOL/L (ref 21–32)
CREAT SERPL-MCNC: <0.5 MG/DL (ref 0.6–1.2)
CULTURE, BLOOD 2: NORMAL
EOSINOPHILS ABSOLUTE: 0 K/UL (ref 0–0.6)
EOSINOPHILS RELATIVE PERCENT: 0.1 %
GFR AFRICAN AMERICAN: >60
GFR NON-AFRICAN AMERICAN: >60
GLUCOSE BLD-MCNC: 116 MG/DL (ref 70–99)
GLUCOSE BLD-MCNC: 87 MG/DL (ref 70–99)
HCT VFR BLD CALC: 34 % (ref 36–48)
HEMOGLOBIN: 11.7 G/DL (ref 12–16)
LYMPHOCYTES ABSOLUTE: 1.1 K/UL (ref 1–5.1)
LYMPHOCYTES RELATIVE PERCENT: 11.9 %
MAGNESIUM: 2.1 MG/DL (ref 1.8–2.4)
MCH RBC QN AUTO: 32.3 PG (ref 26–34)
MCHC RBC AUTO-ENTMCNC: 34.5 G/DL (ref 31–36)
MCV RBC AUTO: 93.6 FL (ref 80–100)
MONOCYTES ABSOLUTE: 0.7 K/UL (ref 0–1.3)
MONOCYTES RELATIVE PERCENT: 8.4 %
NEUTROPHILS ABSOLUTE: 7 K/UL (ref 1.7–7.7)
NEUTROPHILS RELATIVE PERCENT: 78.8 %
PDW BLD-RTO: 13.6 % (ref 12.4–15.4)
PERFORMED ON: ABNORMAL
PLATELET # BLD: 335 K/UL (ref 135–450)
PMV BLD AUTO: 8.5 FL (ref 5–10.5)
POTASSIUM REFLEX MAGNESIUM: 3.7 MMOL/L (ref 3.5–5.1)
RBC # BLD: 3.63 M/UL (ref 4–5.2)
SODIUM BLD-SCNC: 139 MMOL/L (ref 136–145)
TOTAL PROTEIN: 5.5 G/DL (ref 6.4–8.2)
WBC # BLD: 8.9 K/UL (ref 4–11)

## 2021-12-14 PROCEDURE — 6370000000 HC RX 637 (ALT 250 FOR IP): Performed by: INTERNAL MEDICINE

## 2021-12-14 PROCEDURE — 99291 CRITICAL CARE FIRST HOUR: CPT | Performed by: INTERNAL MEDICINE

## 2021-12-14 PROCEDURE — 80053 COMPREHEN METABOLIC PANEL: CPT

## 2021-12-14 PROCEDURE — 36415 COLL VENOUS BLD VENIPUNCTURE: CPT

## 2021-12-14 PROCEDURE — 2000000000 HC ICU R&B

## 2021-12-14 PROCEDURE — 2580000003 HC RX 258: Performed by: INTERNAL MEDICINE

## 2021-12-14 PROCEDURE — 6370000000 HC RX 637 (ALT 250 FOR IP): Performed by: NURSE PRACTITIONER

## 2021-12-14 PROCEDURE — 94660 CPAP INITIATION&MGMT: CPT

## 2021-12-14 PROCEDURE — 6360000002 HC RX W HCPCS: Performed by: INTERNAL MEDICINE

## 2021-12-14 PROCEDURE — 85025 COMPLETE CBC W/AUTO DIFF WBC: CPT

## 2021-12-14 PROCEDURE — 94761 N-INVAS EAR/PLS OXIMETRY MLT: CPT

## 2021-12-14 PROCEDURE — 99233 SBSQ HOSP IP/OBS HIGH 50: CPT | Performed by: INTERNAL MEDICINE

## 2021-12-14 PROCEDURE — 83735 ASSAY OF MAGNESIUM: CPT

## 2021-12-14 PROCEDURE — 2700000000 HC OXYGEN THERAPY PER DAY

## 2021-12-14 PROCEDURE — 94640 AIRWAY INHALATION TREATMENT: CPT

## 2021-12-14 RX ADMIN — SODIUM CHLORIDE, PRESERVATIVE FREE 10 ML: 5 INJECTION INTRAVENOUS at 08:08

## 2021-12-14 RX ADMIN — BENZONATATE 200 MG: 100 CAPSULE ORAL at 20:38

## 2021-12-14 RX ADMIN — QUETIAPINE FUMARATE 12.5 MG: 25 TABLET ORAL at 20:39

## 2021-12-14 RX ADMIN — PIPERACILLIN SODIUM AND TAZOBACTAM SODIUM 3375 MG: 3; .375 INJECTION, POWDER, LYOPHILIZED, FOR SOLUTION INTRAVENOUS at 09:44

## 2021-12-14 RX ADMIN — ACETAMINOPHEN 650 MG: 325 TABLET ORAL at 20:20

## 2021-12-14 RX ADMIN — PSYLLIUM HUSK 1 PACKET: 3.4 POWDER ORAL at 08:08

## 2021-12-14 RX ADMIN — QUETIAPINE FUMARATE 12.5 MG: 25 TABLET ORAL at 08:05

## 2021-12-14 RX ADMIN — ENOXAPARIN SODIUM 30 MG: 100 INJECTION SUBCUTANEOUS at 22:07

## 2021-12-14 RX ADMIN — DEXAMETHASONE SODIUM PHOSPHATE 3 MG: 4 INJECTION, SOLUTION INTRAMUSCULAR; INTRAVENOUS at 08:07

## 2021-12-14 RX ADMIN — BENZONATATE 200 MG: 100 CAPSULE ORAL at 14:32

## 2021-12-14 RX ADMIN — PIPERACILLIN SODIUM AND TAZOBACTAM SODIUM 3375 MG: 3; .375 INJECTION, POWDER, LYOPHILIZED, FOR SOLUTION INTRAVENOUS at 02:10

## 2021-12-14 RX ADMIN — SODIUM CHLORIDE, PRESERVATIVE FREE 10 ML: 5 INJECTION INTRAVENOUS at 22:08

## 2021-12-14 RX ADMIN — LEVOTHYROXINE SODIUM 50 MCG: 0.03 TABLET ORAL at 08:06

## 2021-12-14 RX ADMIN — GUAIFENESIN AND DEXTROMETHORPHAN 5 ML: 100; 10 SYRUP ORAL at 16:15

## 2021-12-14 RX ADMIN — BENZONATATE 200 MG: 100 CAPSULE ORAL at 08:04

## 2021-12-14 RX ADMIN — ESCITALOPRAM OXALATE 5 MG: 10 TABLET ORAL at 08:05

## 2021-12-14 RX ADMIN — Medication 2 PUFF: at 16:12

## 2021-12-14 RX ADMIN — MULTIPLE VITAMINS W/ MINERALS TAB 1 TABLET: TAB at 08:06

## 2021-12-14 RX ADMIN — LIOTHYRONINE SODIUM 5 MCG: 5 TABLET ORAL at 08:06

## 2021-12-14 RX ADMIN — ENOXAPARIN SODIUM 30 MG: 100 INJECTION SUBCUTANEOUS at 08:08

## 2021-12-14 RX ADMIN — FAMOTIDINE 20 MG: 20 TABLET ORAL at 20:39

## 2021-12-14 RX ADMIN — FAMOTIDINE 20 MG: 20 TABLET ORAL at 08:04

## 2021-12-14 ASSESSMENT — PAIN SCALES - GENERAL
PAINLEVEL_OUTOF10: 0
PAINLEVEL_OUTOF10: 3
PAINLEVEL_OUTOF10: 0

## 2021-12-14 NOTE — PROGRESS NOTES
Comprehensive Nutrition Assessment    Type and Reason for Visit:  Reassess    Nutrition Recommendations/Plan:   1. Continue ADULT DIET; Regular diet order. 2. Continue Ensure high-protein with meals. 3. Monitor appetite, meal intake, and acceptance/intake of ONS. 4. Please obtain an actual, current weight for this patient - only a stated weight was obtained on 12/1/21.   5. Monitor respiratory status, nutrition-related labs, bowel function, and weight trends. Nutrition Assessment:  patient has slightly improved from a nutritional standpoint AEB patient consumed adequate po nutrition on 12/11/21 and 12/13/21, however, she remains at risk for further compromise d/t < 50% of meals x 5+ days admission, ongoing respiratory dysfunction r/t COVID-19 virus, increased nutrition needs r/t COVID-19 virus, and last BM was on 12/11/21; will continue ADULT DIET; Regular diet order and Ensure high-protein with meals    Malnutrition Assessment:  Malnutrition Status: At risk for malnutrition     Context:  Acute Illness     Findings of the 6 clinical characteristics of malnutrition:  Energy Intake:  7 - 50% or less of estimated energy requirements for 5 or more days  Weight Loss:  Unable to assess (only a stated weight was obtained on 12/1/21)     Body Fat Loss:  Unable to assess (COVID-19 +)     Muscle Mass Loss:  Unable to assess (COVID-19 +)    Fluid Accumulation:  No significant fluid accumulation     Strength:  Not Performed    Estimated Daily Nutrient Needs:  Energy (kcal):  1472 - 1600 kcals based on 23-25 kcals/kg/CBW; Weight Used for Energy Requirements:  Current     Protein (g):  77 - 90 g protein based on 1.2-1.4 g/kg/CBW;  Weight Used for Protein Requirements:  Current        Fluid (ml/day):  1472 - 1600 ml; Method Used for Fluid Requirements:  1 ml/kcal      Nutrition Related Findings:  patient is A & O x 4; last documented BM was on 12/11/21; abdomen is flat, soft, and bowel sounds are active; h/h is low; ALT/AST are elevated; patient has decadron, tessalon, lovenox, synthroid, pepcid, metamucil, seroquel, MVI, and vitamin D ordered at this time      Wounds:  None       Current Nutrition Therapies:    ADULT DIET; Regular  ADULT ORAL NUTRITION SUPPLEMENT; Breakfast, Lunch, Dinner; Low Calorie/High Protein Oral Supplement    Anthropometric Measures:  · Height: 5' 4\" (162.6 cm)  · Current Body Weight: 140 lb (63.5 kg) (obtained on 12/1/21; stated weight)   · Admission Body Weight: 140 lb (63.5 kg) (obtained on 12/1/21; stated weight)    · Usual Body Weight: 146 lb (66.2 kg) (obtained on 6/18/21; unknown whether actual weight or stated/estimated weight)     · Ideal Body Weight: 120 lbs; % Ideal Body Weight 116.7 %   · BMI: 24     · BMI Categories: Normal Weight (BMI 22.0 to 24.9) age over 72       Nutrition Diagnosis:   · Inadequate oral intake related to inadequate protein-energy intake, impaired respiratory function, increase demand for energy/nutrients as evidenced by poor intake prior to admission, intake 26-50%, intake 51-75%, lab values, constipation, other (comment) (COVID-19 +)      Nutrition Interventions:   Food and/or Nutrient Delivery:  Continue Current Diet, Continue Oral Nutrition Supplement  Nutrition Education/Counseling:  No recommendation at this time   Coordination of Nutrition Care:  Continue to monitor while inpatient, Interdisciplinary Rounds    Goals:  patient will consume 50% or greater of meals on ADULT DIET;  Regular diet order x 3 meals per day and she will consume 50% or greater of Ensure high-protein with meals       Nutrition Monitoring and Evaluation:   Behavioral-Environmental Outcomes:  None Identified   Food/Nutrient Intake Outcomes:  Food and Nutrient Intake, Supplement Intake  Physical Signs/Symptoms Outcomes:  Biochemical Data, Constipation, GI Status, Hemodynamic Status, Skin     Discharge Planning:    Continue current diet     Electronically signed by Adalberto Ahn RD, LD

## 2021-12-14 NOTE — PROGRESS NOTES
12/13/21 2344   NIV Type   NIV Started/Stopped On   Equipment Type V60   Mode Bilevel   Mask Type Total face   Mask Size Medium   Settings/Measurements   IPAP 12 cmH20   CPAP/EPAP 8 cmH2O   Rate Ordered 16   Resp 16   Insp Rise Time (%) 2 %   FiO2  65 %   I Time/ I Time % 1 s   Vt Exhaled 335 mL   Minute Volume 7.6 Liters   Mask Leak (lpm) 33 lpm   Comfort Level Good   Using Accessory Muscles No   SpO2 100

## 2021-12-14 NOTE — PROGRESS NOTES
Dr. Hanson Fraction @ bedside assessing pt for interdisciplinary rounds. All labs, lines, tubes, assessment, POC, and increased crackles from yesterdays assessment reviewed. Per MD elias d/jessi, MD decreased FIO2 to 75%, CPAP to be used prn. See new orders.

## 2021-12-14 NOTE — PROGRESS NOTES
12/14/21 0241   NIV Type   NIV Started/Stopped On   Equipment Type V60   Mode Bilevel   Mask Type Total face   Mask Size Medium   Settings/Measurements   IPAP 12 cmH20   CPAP/EPAP 8 cmH2O   Rate Ordered 16   Resp 22   Insp Rise Time (%) 2 %   FiO2  65 %   I Time/ I Time % 1 s   Vt Exhaled 333 mL   Minute Volume 7.6 Liters   Mask Leak (lpm) 21 lpm   Comfort Level Good   Using Accessory Muscles No   SpO2 95

## 2021-12-14 NOTE — PROGRESS NOTES
Hospitalist Progress Note      PCP: Marilyn Moscoso MD    Date of Admission: 12/1/2021     covid pna, unvaccinated  Worsened hypoxia   using BIPAP overnight, now on vapotherm  Was upto chair this am      Subjective:     Ms Sandoval seen in ICU bed, on vapotherm   Feels improving slowly and o2 requirements better , down to 70 %  Daughter at bedside     Medications:  Reviewed    Infusion Medications    sodium chloride Stopped (12/03/21 1632)     Scheduled Medications    dexamethasone  3 mg IntraVENous Q24H    alteplase  1 mg IntraCATHeter Once    piperacillin-tazobactam  3,375 mg IntraVENous Q8H    benzonatate  200 mg Oral TID    influenza virus vaccine  0.5 mL IntraMUSCular Prior to discharge    escitalopram  5 mg Oral Daily    QUEtiapine  12.5 mg Oral BID    psyllium  1 packet Oral Daily    lidocaine 1 % injection  5 mL IntraDERmal Once    vitamin D  50,000 Units Oral Weekly    famotidine  20 mg Oral BID    sodium chloride flush  5-40 mL IntraVENous 2 times per day    enoxaparin  30 mg SubCUTAneous BID    levothyroxine  50 mcg Oral Daily    liothyronine  5 mcg Oral Daily    therapeutic multivitamin-minerals  1 tablet Oral Daily     PRN Meds: albuterol sulfate HFA, sodium chloride, oxyCODONE, bismuth subsalicylate, melatonin, sodium chloride flush, sodium chloride, ondansetron **OR** ondansetron, polyethylene glycol, acetaminophen **OR** acetaminophen, guaiFENesin-dextromethorphan      Intake/Output Summary (Last 24 hours) at 12/14/2021 4037  Last data filed at 12/14/2021 0700  Gross per 24 hour   Intake 2331 ml   Output 2725 ml   Net -394 ml       Physical Exam Performed:    /80   Pulse 77   Temp 98.7 °F (37.1 °C) (Bladder)   Resp 22   Ht 5' 4\" (1.626 m)   Wt 140 lb (63.5 kg)   SpO2 95%   BMI 24.03 kg/m²         General: thin  appearing elderly female, ill appearing    Awake, alert and oriented.  Appears to be not in any distress  Mucous Membranes:  Pink , anicteric  Neck: No JVD, no carotid bruit, no thyromegaly  Chest improving  shawn air entry with scattered crackles  Cardiovascular:  RRR S1S2 heard, no murmurs or gallops  Abdomen:  Soft, undistended, non tender, no organomegaly, BS present  Extremities: No edema or cyanosis. Distal pulses well felt  Neurological : grossly normal      Labs:   Recent Labs     12/12/21  0645 12/13/21  0442 12/14/21  0400   WBC 8.4 9.7 8.9   HGB 12.2 12.1 11.7*   HCT 36.2 35.6* 34.0*    325 335     Recent Labs     12/12/21  0645 12/13/21  0442 12/14/21  0400    140 139   K 3.2* 3.4* 3.7   CL 99 103 102   CO2 31 28 30   BUN 15 14 14   CREATININE <0.5* <0.5* <0.5*   CALCIUM 8.2* 8.3 8.5     Recent Labs     12/12/21  0645 12/13/21  0442 12/14/21  0400   AST 36 38* 43*   ALT 16 24 41*   BILITOT 0.5 0.4 0.4   ALKPHOS 111 109 94     No results for input(s): INR in the last 72 hours. No results for input(s): Heena Castor in the last 72 hours. Urinalysis:      Lab Results   Component Value Date    NITRU Negative 12/10/2021    BLOODU LARGE 12/10/2021    SPECGRAV 1.025 12/10/2021    GLUCOSEU Negative 12/10/2021       Radiology:  XR CHEST PORTABLE   Final Result   Moderate diffuse airspace disease compatible with multifocal pneumonia with   asymmetric involvement and consolidation left lower lobe. This could   represent COVID pneumonia with superimposed edema. IR PICC WO SQ PORT/PUMP > 5 YEARS   Final Result   Successful placement of PICC line. CT CHEST PULMONARY EMBOLISM W CONTRAST   Final Result   No evidence of pulmonary embolism. Extensive bilateral multifocal airspace disease compatible with multifocal   pneumonia, specifically COVID pneumonia. XR CHEST PORTABLE   Final Result   Multifocal peripherally located pulmonary opacities are seen, likely related   to COVID-19 pneumonia.                  Assessment/Plan:      COVID PNA  Acute Hypoxic Respiratory Failure/ARDS    - Pt is unvaccinated  - imaging with bilateral infiltrates   - does not wear O2 at baseline  - Admit to Med Surg, droplet + precautions, tele- progressive hypoxia needing ICU transfer and currently on vapotherm, alternating with bipap  - self proning as able - wishes to post pone vent support   - treated with  Decadron D#13, 3 mg ,  Status post Tocilizumab 12/3/2021. Post 2 doses of baricitinib PRN Robitussin  - lovenox bid     Possible sec bacterial infection  - risk for gram neg infection   - completed levaquin for 5 days, now on zosyn D5 - can stop  - cx remain neg         Severe anxiety - was on precedex , now changed  To seroquel bid  Resumed lexapro       #Acquired hypothyroidism. Continue home medication.          dvt prophylaxis with lovenox bid  Start ambulation  Out of bed    Diet: ADULT DIET; Regular  ADULT ORAL NUTRITION SUPPLEMENT; Breakfast, Lunch, Dinner;  Low Calorie/High Protein Oral Supplement  Code Status: DNR-CCA    PT/OT Eval Status: ordered    Out of bed      Oskar Talley MD, 12/14/2021 7:12 AM

## 2021-12-14 NOTE — PROGRESS NOTES
Pulmonary & Critical Care Medicine ICU Progress Note    CC: COVID-19    Events of Last 24 hours:   Used Bipap at 65% overnight; Vapotherm 40 LPM/80% FiO2;  hypoxic with movement    Vascular lines: IV: PICC 12/3    MV:       / / /FiO2 : 65 %  No results for input(s): PHART, HBJ3JCW, PO2ART in the last 72 hours. IV:   sodium chloride Stopped (12/03/21 1632)     Vitals:  Blood pressure 135/80, pulse 77, temperature 98.7 °F (37.1 °C), temperature source Bladder, resp. rate 22, height 5' 4\" (1.626 m), weight 140 lb (63.5 kg), SpO2 95 %, not currently breastfeeding. On Vapotherm     Constitutional:  No acute distress. Eyes: PERRL. Conjunctivae anicteric. ENT: Normal nose. Normal tongue. Neck:  Trachea is midline. No thyroid tenderness. Respiratory: No accessory muscle usage. Decreased breath sounds. No wheezes. + rales. No Rhonchi. Cardiovascular: Normal S1S2. No digit clubbing. No digit cyanosis. No LE edema. Psychiatric: No anxiety or Agitation. Alert and Oriented to person, place and time.     Scheduled Meds:   dexamethasone  3 mg IntraVENous Q24H    alteplase  1 mg IntraCATHeter Once    piperacillin-tazobactam  3,375 mg IntraVENous Q8H    benzonatate  200 mg Oral TID    influenza virus vaccine  0.5 mL IntraMUSCular Prior to discharge    escitalopram  5 mg Oral Daily    QUEtiapine  12.5 mg Oral BID    psyllium  1 packet Oral Daily    lidocaine 1 % injection  5 mL IntraDERmal Once    vitamin D  50,000 Units Oral Weekly    famotidine  20 mg Oral BID    sodium chloride flush  5-40 mL IntraVENous 2 times per day    enoxaparin  30 mg SubCUTAneous BID    levothyroxine  50 mcg Oral Daily    liothyronine  5 mcg Oral Daily    therapeutic multivitamin-minerals  1 tablet Oral Daily       Data:  CBC:   Recent Labs     12/12/21  0645 12/13/21  0442 12/14/21  0400   WBC 8.4 9.7 8.9   HGB 12.2 12.1 11.7*   HCT 36.2 35.6* 34.0*   MCV 93.0 93.7 93.6    325 335     BMP:   Recent Labs 12/12/21  0645 12/13/21  0442 12/14/21  0400    140 139   K 3.2* 3.4* 3.7   CL 99 103 102   CO2 31 28 30   BUN 15 14 14   CREATININE <0.5* <0.5* <0.5*     LIVER PROFILE:   Recent Labs     12/12/21  0645 12/13/21  0442 12/14/21  0400   AST 36 38* 43*   ALT 16 24 41*   BILITOT 0.5 0.4 0.4   ALKPHOS 111 109 94     Microbiology:  12/1 BC NGTD  12/10 Resp NGTD   12/10 BC NGTD   12/10  NGTD     Imaging:  CTPA 12/6 imaging reviewed by me and showed  Diffuse bilateral airspace disease-worsening    ASSESSMENT:  · Acute hypoxemic respiratory failure -severe progressive life-threatening  · COVID-19 viral pneumonia  · ARDS  · Anxiety/agitation - improved  · Not vaccinated for COVID-19      PLAN:  Vapotherm for life-threatening acute hypoxemic respiratory failure and titrate to maintain SaO2 >92%  QHS/PRN Bipap  Stop Zosyn D#5 with negative cx. Completed Levaquin D#5  Droplet plus isolation  Dexamethasone IV D#13 at 3mg, monitor blood glucose closely  Tocilizumab 12/3/21. Post 2 doses of Baricitinib   Seroquel 12.5 PO Q12 hrs   Tessalon TID   DVT prophylaxis: Lovenox BID  DNR CCA   Total critical care time caring for this patient with life threatening, unstable organ failure, including direct patient contact, management of life support systems, review of data including imaging and labs, discussions with other team members and physicians is 31 minutes, excluding procedures.

## 2021-12-15 LAB
A/G RATIO: 1.2 (ref 1.1–2.2)
ALBUMIN SERPL-MCNC: 3.2 G/DL (ref 3.4–5)
ALP BLD-CCNC: 90 U/L (ref 40–129)
ALT SERPL-CCNC: 50 U/L (ref 10–40)
ANION GAP SERPL CALCULATED.3IONS-SCNC: 8 MMOL/L (ref 3–16)
AST SERPL-CCNC: 41 U/L (ref 15–37)
BASOPHILS ABSOLUTE: 0.1 K/UL (ref 0–0.2)
BASOPHILS RELATIVE PERCENT: 0.8 %
BILIRUB SERPL-MCNC: 0.4 MG/DL (ref 0–1)
BUN BLDV-MCNC: 12 MG/DL (ref 7–20)
CALCIUM SERPL-MCNC: 8.6 MG/DL (ref 8.3–10.6)
CHLORIDE BLD-SCNC: 103 MMOL/L (ref 99–110)
CO2: 29 MMOL/L (ref 21–32)
CREAT SERPL-MCNC: <0.5 MG/DL (ref 0.6–1.2)
EOSINOPHILS ABSOLUTE: 0.1 K/UL (ref 0–0.6)
EOSINOPHILS RELATIVE PERCENT: 1 %
GFR AFRICAN AMERICAN: >60
GFR NON-AFRICAN AMERICAN: >60
GLUCOSE BLD-MCNC: 88 MG/DL (ref 70–99)
HCT VFR BLD CALC: 36.6 % (ref 36–48)
HEMOGLOBIN: 12.3 G/DL (ref 12–16)
LYMPHOCYTES ABSOLUTE: 1.5 K/UL (ref 1–5.1)
LYMPHOCYTES RELATIVE PERCENT: 20 %
MAGNESIUM: 2.2 MG/DL (ref 1.8–2.4)
MCH RBC QN AUTO: 31.6 PG (ref 26–34)
MCHC RBC AUTO-ENTMCNC: 33.6 G/DL (ref 31–36)
MCV RBC AUTO: 94.1 FL (ref 80–100)
MONOCYTES ABSOLUTE: 0.8 K/UL (ref 0–1.3)
MONOCYTES RELATIVE PERCENT: 10.9 %
NEUTROPHILS ABSOLUTE: 5.1 K/UL (ref 1.7–7.7)
NEUTROPHILS RELATIVE PERCENT: 67.3 %
PDW BLD-RTO: 13.9 % (ref 12.4–15.4)
PLATELET # BLD: 386 K/UL (ref 135–450)
PMV BLD AUTO: 8 FL (ref 5–10.5)
POTASSIUM REFLEX MAGNESIUM: 3.4 MMOL/L (ref 3.5–5.1)
RBC # BLD: 3.89 M/UL (ref 4–5.2)
SODIUM BLD-SCNC: 140 MMOL/L (ref 136–145)
TOTAL PROTEIN: 5.8 G/DL (ref 6.4–8.2)
WBC # BLD: 7.5 K/UL (ref 4–11)

## 2021-12-15 PROCEDURE — 85025 COMPLETE CBC W/AUTO DIFF WBC: CPT

## 2021-12-15 PROCEDURE — 99291 CRITICAL CARE FIRST HOUR: CPT | Performed by: INTERNAL MEDICINE

## 2021-12-15 PROCEDURE — 6370000000 HC RX 637 (ALT 250 FOR IP): Performed by: INTERNAL MEDICINE

## 2021-12-15 PROCEDURE — 97535 SELF CARE MNGMENT TRAINING: CPT

## 2021-12-15 PROCEDURE — 2580000003 HC RX 258: Performed by: INTERNAL MEDICINE

## 2021-12-15 PROCEDURE — 97530 THERAPEUTIC ACTIVITIES: CPT

## 2021-12-15 PROCEDURE — 6360000002 HC RX W HCPCS: Performed by: INTERNAL MEDICINE

## 2021-12-15 PROCEDURE — 97110 THERAPEUTIC EXERCISES: CPT

## 2021-12-15 PROCEDURE — 94761 N-INVAS EAR/PLS OXIMETRY MLT: CPT

## 2021-12-15 PROCEDURE — 83735 ASSAY OF MAGNESIUM: CPT

## 2021-12-15 PROCEDURE — 99233 SBSQ HOSP IP/OBS HIGH 50: CPT | Performed by: INTERNAL MEDICINE

## 2021-12-15 PROCEDURE — 6370000000 HC RX 637 (ALT 250 FOR IP): Performed by: NURSE PRACTITIONER

## 2021-12-15 PROCEDURE — 80053 COMPREHEN METABOLIC PANEL: CPT

## 2021-12-15 PROCEDURE — 2060000000 HC ICU INTERMEDIATE R&B

## 2021-12-15 PROCEDURE — 2700000000 HC OXYGEN THERAPY PER DAY

## 2021-12-15 RX ORDER — DEXAMETHASONE 1 MG
2 TABLET ORAL EVERY 12 HOURS SCHEDULED
Status: DISCONTINUED | OUTPATIENT
Start: 2021-12-16 | End: 2021-12-17

## 2021-12-15 RX ORDER — POTASSIUM CHLORIDE 750 MG/1
40 TABLET, EXTENDED RELEASE ORAL ONCE
Status: COMPLETED | OUTPATIENT
Start: 2021-12-15 | End: 2021-12-15

## 2021-12-15 RX ORDER — POTASSIUM CHLORIDE 7.45 MG/ML
10 INJECTION INTRAVENOUS
Status: DISPENSED | OUTPATIENT
Start: 2021-12-15 | End: 2021-12-15

## 2021-12-15 RX ADMIN — BENZONATATE 200 MG: 100 CAPSULE ORAL at 14:57

## 2021-12-15 RX ADMIN — LEVOTHYROXINE SODIUM 50 MCG: 0.03 TABLET ORAL at 05:19

## 2021-12-15 RX ADMIN — FAMOTIDINE 20 MG: 20 TABLET ORAL at 20:04

## 2021-12-15 RX ADMIN — ENOXAPARIN SODIUM 30 MG: 100 INJECTION SUBCUTANEOUS at 10:01

## 2021-12-15 RX ADMIN — FAMOTIDINE 20 MG: 20 TABLET ORAL at 10:02

## 2021-12-15 RX ADMIN — LIOTHYRONINE SODIUM 5 MCG: 5 TABLET ORAL at 10:03

## 2021-12-15 RX ADMIN — MULTIPLE VITAMINS W/ MINERALS TAB 1 TABLET: TAB at 10:03

## 2021-12-15 RX ADMIN — Medication 5 MG: at 20:04

## 2021-12-15 RX ADMIN — BENZONATATE 200 MG: 100 CAPSULE ORAL at 20:04

## 2021-12-15 RX ADMIN — POTASSIUM CHLORIDE 40 MEQ: 750 TABLET, EXTENDED RELEASE ORAL at 10:03

## 2021-12-15 RX ADMIN — ESCITALOPRAM OXALATE 5 MG: 10 TABLET ORAL at 10:03

## 2021-12-15 RX ADMIN — PSYLLIUM HUSK 1 PACKET: 3.4 POWDER ORAL at 10:02

## 2021-12-15 RX ADMIN — BENZONATATE 200 MG: 100 CAPSULE ORAL at 10:02

## 2021-12-15 RX ADMIN — QUETIAPINE FUMARATE 12.5 MG: 25 TABLET ORAL at 10:02

## 2021-12-15 RX ADMIN — SODIUM CHLORIDE, PRESERVATIVE FREE 10 ML: 5 INJECTION INTRAVENOUS at 10:04

## 2021-12-15 RX ADMIN — Medication 5 MG: at 05:19

## 2021-12-15 RX ADMIN — QUETIAPINE FUMARATE 12.5 MG: 25 TABLET ORAL at 20:04

## 2021-12-15 RX ADMIN — DEXAMETHASONE SODIUM PHOSPHATE 3 MG: 4 INJECTION, SOLUTION INTRAMUSCULAR; INTRAVENOUS at 10:03

## 2021-12-15 RX ADMIN — ENOXAPARIN SODIUM 30 MG: 100 INJECTION SUBCUTANEOUS at 20:03

## 2021-12-15 ASSESSMENT — PAIN SCALES - GENERAL
PAINLEVEL_OUTOF10: 0

## 2021-12-15 NOTE — PROGRESS NOTES
Per pt request pt up to recliner, tolerated fairly well. Call light within reach. Pt updated not to get ooc w/o assistance, verbalized understanding.

## 2021-12-15 NOTE — PROGRESS NOTES
Pulmonary & Critical Care Medicine ICU Progress Note    CC: COVID-19    Events of Last 24 hours:   Used Bipap at 65% overnight; Vapotherm 30 LPM/60% FiO2;  hypoxic with movement    Vascular lines: IV: PICC 12/3    MV:       / / /FiO2 : 80 %  No results for input(s): PHART, FDH1LMV, PO2ART in the last 72 hours. IV:   sodium chloride Stopped (12/03/21 1632)     Vitals:  Blood pressure 113/60, pulse 79, temperature 98.8 °F (37.1 °C), temperature source Bladder, resp. rate 21, height 5' 4\" (1.626 m), weight 140 lb (63.5 kg), SpO2 92 %, not currently breastfeeding. On Vapotherm     Constitutional:  No acute distress. Eyes: PERRL. Conjunctivae anicteric. ENT: Normal nose. Normal tongue. Neck:  Trachea is midline. No thyroid tenderness. Respiratory: No accessory muscle usage. Decreased breath sounds. No wheezes. + rales. No Rhonchi. Cardiovascular: Normal S1S2. No digit clubbing. No digit cyanosis. No LE edema. Psychiatric: No anxiety or Agitation. Alert and Oriented to person, place and time.     Scheduled Meds:   dexamethasone  3 mg IntraVENous Q24H    alteplase  1 mg IntraCATHeter Once    benzonatate  200 mg Oral TID    influenza virus vaccine  0.5 mL IntraMUSCular Prior to discharge    escitalopram  5 mg Oral Daily    QUEtiapine  12.5 mg Oral BID    psyllium  1 packet Oral Daily    lidocaine 1 % injection  5 mL IntraDERmal Once    vitamin D  50,000 Units Oral Weekly    famotidine  20 mg Oral BID    sodium chloride flush  5-40 mL IntraVENous 2 times per day    enoxaparin  30 mg SubCUTAneous BID    levothyroxine  50 mcg Oral Daily    liothyronine  5 mcg Oral Daily    therapeutic multivitamin-minerals  1 tablet Oral Daily       Data:  CBC:   Recent Labs     12/13/21  0442 12/14/21  0400 12/15/21  0510   WBC 9.7 8.9 7.5   HGB 12.1 11.7* 12.3   HCT 35.6* 34.0* 36.6   MCV 93.7 93.6 94.1    335 386     BMP:   Recent Labs     12/13/21  0442 12/14/21  0400 12/15/21  0510    139 140 K 3.4* 3.7 3.4*    102 103   CO2 28 30 29   BUN 14 14 12   CREATININE <0.5* <0.5* <0.5*     LIVER PROFILE:   Recent Labs     12/13/21  0442 12/14/21  0400 12/15/21  0510   AST 38* 43* 41*   ALT 24 41* 50*   BILITOT 0.4 0.4 0.4   ALKPHOS 109 94 90     Microbiology:  12/1 BC NGTD  12/10 Resp NGTD   12/10 BC NGTD   12/10  NGTD     Imaging:  CTPA 12/6 imaging reviewed by me and showed  Diffuse bilateral airspace disease-worsening    ASSESSMENT:  · Acute hypoxemic respiratory failure -severe progressive life-threatening  · COVID-19 viral pneumonia  · ARDS  · Anxiety/agitation - improved  · Not vaccinated for COVID-19      PLAN:  Vapotherm for life-threatening acute hypoxemic respiratory failure and titrate to maintain SaO2 >92%  QHS/PRN Bipap  Stop Zosyn D#5 with negative cx. Completed Levaquin D#5  Droplet plus isolation  Dexamethasone IV D#14 at 3mg, decrease to 2mg PO tomorrow, monitor blood glucose closely  Tocilizumab 12/3/21. Post 2 doses of Baricitinib   Seroquel 12.5 PO Q12 hrs   Tessalon TID   Replace K  DVT prophylaxis: Lovenox BID  DNR CCA   Total critical care time caring for this patient with life threatening, unstable organ failure, including direct patient contact, management of life support systems, review of data including imaging and labs, discussions with other team members and physicians is 31 minutes, excluding procedures.

## 2021-12-15 NOTE — PROGRESS NOTES
Patient was transferred, with this nurse, to PCU. Arya Niño Patient tolerated transfer well. Oxygen saturation remained stable. Care continued.

## 2021-12-15 NOTE — PROGRESS NOTES
Occupational Therapy Daily Treatment Note    Unit: ICU  Date:  12/15/2021  Patient Name:    Karin Crowley  Admitting diagnosis:  Acute respiratory disease due to COVID-19 virus [U07.1, J06.9]  Acute respiratory failure due to COVID-19 [facility]) [U07.1, J96.00]  Admit Date:  12/1/2021  Precautions/Restrictions:  Fall risk, Lines -Supplemental O2 (Vapotherm 35Lpm, 70% FiO2) and Lee catheter, Telemetry, Continuous pulse oximetry and Isolation Precautions: Droplet Plus - COVID   Treatment Time:  [id number]   Treatment number:  4  Total Treatment Time:   35minutes    Patient Goals for Session:  \" I want to go to her dtrs home upon D/C and not rehab  \"      Discharge Recommendations: ARU/IRF- pt declining   DME needs for discharge: defer to facility       Therapy recommendations for staff:  Assist of 1 with use of gait belt and RW for all transfers to/from BSC/chair    History of Present Illness: Moni Silveira unvaccinated 80 y.o. female  With Vital signs of /65   Pulse 94   Temp 99.4 °F (37.4 °C) (Oral)   Resp 22   Ht 5' 4\" (1.626 m)   Wt 140 lb (63.5 kg)   SpO2 94%   BMI 24.03 kg/m²  who presents to the ED with a complaint of shortness of breath.  Patient seen and evaluated in room 2.  Patient was diagnosed with Covid 2 weeks ago with a positive Covid test.  Since then she has been doing fine until the this evening when she started to get more short of breath.  She says she tested her pulse oximetry level and it was found to be in the seventies.  But got progressively better.  Upon arrival here in the emergency department her pulse oximetry reading is 94% on a nonrebreather mask.  She denies any chest pain no leg swelling.  Patient was treated with 1 DuoNeb on the way into the emergency department by EMS.  No other complaints, modifying factors or associated symptoms  12/3-was 100% Vapotherm during rounds.  She was down to 80% Vapotherm in the prone position but had to be made supine for PICC line insertion. Logan Jean is to reprone her and try to decrease FiO2.  She is on Precedex  12/4-patient has been manually self proning.  FiO2 down to 80%.  On 40 L/min.  Refused BiPAP overnight   12/5- used Bipap last night. On 80% vapo therm. Feeling about the same. 12/6  Currently on Vapotherm 100% 40 L   BiPAP overnight  12/7  Now on Vapotherm 70% FiO2 40 L/min  Used BiPAP overnight  She is  self proning\"    Home Health S4 Level Recommendation:  NA    AM-PAC Score: AM-PAC Inpatient Daily Activity Raw Score: 14  Pt scored a 14/24 on the AM-PAC ADL Inpatient form. Current research shows that an AM-PAC score of 17 or less is typically not associated with a discharge to the patient's home setting. Subjective:  Pt sitting on edge of bed with PT upon therapist arrival. Pt agreeable to work with therapy this date. Pain   No  Rating: NA  Location: None reported throughout   Pain Medicine Status: No request made      Cognition:    A&O Person, Place, Time and Situation   Able to follow 2 step commands, consistently. Balance:  Functional Sitting Balance: WNL  Functional Standing Balance:dimished     Bed mobility:    Supine to sit:   NT  Sit to supine:   NT  Rolling:    Not Tested  Scooting in sitting:  Supervision   Scooting to head of bed:        NT   Bridging:    NT    Transfers:    Sit to stand:  CGA  Stand to sit:  CGA      Bed to chair:   CGA with RW   Standard toilet: Not Tested  Bed to [facility]:  Not Tested    Activities of Daily Living:   UB Dressing:   NT  LB Dressing:    Supervision to don socks , and pullups with assist with yoo and pullup   UB Bathing:  Not Tested  LB Bathing:  Supervision bathing feet   Feeding:  NT  Grooming:   IND brushing hair  Toileting:  Not Tested    Activity Tolerance:   Pt completed therapy session with dizziness .  When the pt dropped to 89% during therapy with HR increased to 120-131 with activity      Therapeutic Exercise:   Shoulder shrugs 15x  Shoulder retraction 15x       Patient Education:   Role of OT  Recommendations for DC  Instructed in the use of energy conservation   Positioning Needs: In bed, call light and needs in reach. Family Present:  No    Assessment: Pt with good progress this date. Pt able to tolerate increased activity and transferred to the chair with CGA assist  with RW . Pt was sup to don socks and pull ups. and IND brushing hair. Pt stated that she does not want to go to rehab and wants to go to [facility] house that has 3 steps to enter. The pt instructed in energy conservation and AD for home . Pt may benefit from continued skilled occupational therapy while in the hospital and upon in order to progress to a safe and more indpt level of functioning. Pt reports affect improved and now sees the ability for progress. GOALS  To be met in 3 Visits:  1). Bed to toilet/BSC: Min A with AD as needed      To be met in 5 Visits:  1). Supine to/from Sit:             Modified Independent  2). Upper Body Bathing:         Supervision  3). Lower Body Bathing:         Min A - goal met - new goal sup  4). Upper Body Dressing:       Supervision  5). Lower Body Dressing:       Min A -goal met - new goal sup-ind   6).  Pt to demonstrate UE exs x 15 reps with minimal cues       Plan: cont with JOSE LUIS Dubois OTR/L [id number]      If patient discharges from this facility prior to next visit, this note will serve as the Discharge Summary

## 2021-12-15 NOTE — PROGRESS NOTES
Patient placed on BiPAP for the evening per request. Additional heated blanket applied and room environment altered for patient comfort. Patient satisfied and had no concerns or needs at this time.      Electronically signed by Wilber Cm RN on 12/14/2021 at 10:33 PM

## 2021-12-15 NOTE — PROGRESS NOTES
Hospitalist Progress Note      PCP: Inder Dickerson MD    Date of Admission: 12/1/2021     covid pna, unvaccinated  Worsened hypoxia   using BIPAP overnight, now on vapotherm  Was upto chair this am      Subjective:     Ms Sandoval seen in ICU bed, remains  on vapotherm   Working with PT and doing better  Feels improving slowly and o2 requirements better , down to 70 %  Daughter at bedside     Medications:  Reviewed    Infusion Medications    sodium chloride Stopped (12/03/21 1632)     Scheduled Medications    dexamethasone  3 mg IntraVENous Q24H    alteplase  1 mg IntraCATHeter Once    benzonatate  200 mg Oral TID    influenza virus vaccine  0.5 mL IntraMUSCular Prior to discharge    escitalopram  5 mg Oral Daily    QUEtiapine  12.5 mg Oral BID    psyllium  1 packet Oral Daily    lidocaine 1 % injection  5 mL IntraDERmal Once    vitamin D  50,000 Units Oral Weekly    famotidine  20 mg Oral BID    sodium chloride flush  5-40 mL IntraVENous 2 times per day    enoxaparin  30 mg SubCUTAneous BID    levothyroxine  50 mcg Oral Daily    liothyronine  5 mcg Oral Daily    therapeutic multivitamin-minerals  1 tablet Oral Daily     PRN Meds: albuterol sulfate HFA, sodium chloride, oxyCODONE, bismuth subsalicylate, melatonin, sodium chloride flush, sodium chloride, ondansetron **OR** ondansetron, polyethylene glycol, acetaminophen **OR** acetaminophen, guaiFENesin-dextromethorphan      Intake/Output Summary (Last 24 hours) at 12/15/2021 0658  Last data filed at 12/15/2021 0500  Gross per 24 hour   Intake 980 ml   Output 2790 ml   Net -1810 ml       Physical Exam Performed:    /64   Pulse 72   Temp 98.6 °F (37 °C) (Bladder)   Resp 16   Ht 5' 4\" (1.626 m)   Wt 140 lb (63.5 kg)   SpO2 91%   BMI 24.03 kg/m²         General: thin  appearing elderly female, ill appearing    Awake, alert and oriented.  Appears to be not in any distress  Mucous Membranes:  Pink , anicteric  Neck: No JVD, no carotid bruit, no thyromegaly  Chest improving  shawn air entry with scattered crackles  Cardiovascular:  RRR S1S2 heard, no murmurs or gallops  Abdomen:  Soft, undistended, non tender, no organomegaly, BS present  Extremities: No edema or cyanosis. Distal pulses well felt  Neurological : grossly normal with improving gen weakness      Labs:   Recent Labs     12/13/21 0442 12/14/21  0400 12/15/21  0510   WBC 9.7 8.9 7.5   HGB 12.1 11.7* 12.3   HCT 35.6* 34.0* 36.6    335 386     Recent Labs     12/13/21  0442 12/14/21  0400 12/15/21  0510    139 140   K 3.4* 3.7 3.4*    102 103   CO2 28 30 29   BUN 14 14 12   CREATININE <0.5* <0.5* <0.5*   CALCIUM 8.3 8.5 8.6     Recent Labs     12/13/21 0442 12/14/21  0400 12/15/21  0510   AST 38* 43* 41*   ALT 24 41* 50*   BILITOT 0.4 0.4 0.4   ALKPHOS 109 94 90     No results for input(s): INR in the last 72 hours. No results for input(s): [place] in the last 72 hours. Urinalysis:      Lab Results   Component Value Date    NITRU Negative 12/10/2021    BLOODU LARGE 12/10/2021    SPECGRAV 1.025 12/10/2021    GLUCOSEU Negative 12/10/2021       Radiology:  XR CHEST PORTABLE   Final Result   Moderate diffuse airspace disease compatible with multifocal pneumonia with   asymmetric involvement and consolidation left lower lobe. This could   represent COVID pneumonia with superimposed edema. IR PICC WO SQ PORT/PUMP > 5 YEARS   Final Result   Successful placement of PICC line. CT CHEST PULMONARY EMBOLISM W CONTRAST   Final Result   No evidence of pulmonary embolism. Extensive bilateral multifocal airspace disease compatible with multifocal   pneumonia, specifically COVID pneumonia. XR CHEST PORTABLE   Final Result   Multifocal peripherally located pulmonary opacities are seen, likely related   to COVID-19 pneumonia.                  Assessment/Plan:      COVID PNA  Acute Hypoxic Respiratory Failure/ARDS    - Pt is unvaccinated  - imaging with bilateral infiltrates   - does not wear O2 at baseline  - Admit to Med Surg, droplet + precautions, tele- progressive hypoxia needing ICU transfer and currently on vapotherm, alternating with bipap  - treated with  Decadron D#14, 3 mg ,  Status post Tocilizumab 12/3/2021. Post 2 doses of baricitinib PRN Robitussin  - lovenox bid   - slowly improving hypoxia    Possible sec bacterial infection  - risk for gram neg infection   - completed levaquin for 5 days, later on zosyn for 5 days - off now   - cx remain neg         Severe anxiety - was on precedex , now changed  To seroquel bid  Resumed lexapro       #Acquired hypothyroidism. Continue home medication.          dvt prophylaxis with lovenox bid  Start ambulation  Out of bed  Remove yoo    Diet: ADULT DIET; Regular  ADULT ORAL NUTRITION SUPPLEMENT; Breakfast, Lunch, Dinner;  Low Calorie/High Protein Oral Supplement  Code Status: DNR-CCA    PT/OT Eval Status: ordered    Out of bed      Treasure Miramontes MD, 12/15/2021 6:58 AM

## 2021-12-15 NOTE — CARE COORDINATION
[facility]  Received referral regarding HC services from Fabby Diaz. Liaison to send [facility] coverage request to [facility] closer to [place].       Electronically signed by Rosa Nolan RN on 12/15/2021 at 2:23 PM

## 2021-12-15 NOTE — PROGRESS NOTES
Patient resting comfortably in bed. Daughter present. Vitals stable. Vapo therm settings at 35L and 70%. Call light within reach.

## 2021-12-15 NOTE — PROGRESS NOTES
Inpatient Physical Therapy Daily Treatment Note    Unit: ICU  Date:  12/15/2021  Patient Name:    Cesar Sanders  Admitting diagnosis:  Acute respiratory disease due to COVID-19 virus [U07.1, J06.9]  Acute respiratory failure due to COVID-19 [facility]) [U07.1, J96.00]  Admit Date:  12/1/2021  Precautions/Restrictions:  Fall risk, Lines -Supplemental O2 (Vapotherm 35Lpm, 70% FiO2) and Lee catheter, Telemetry, Continuous pulse oximetry and Isolation Precautions: Droplet Plus - COVID         Discharge Recommendations: ARU/IRF (inpatient rehab facility)   DME needs for discharge: defer to facility       Therapy recommendation for EMS Transport: can transport by wheelchair    Therapy recommendations for staff:   Assist of 1 with use of gait belt and hand held assist for all transfers to/from chair    History of Present Illness: Per Dr. Valerie Jay progress note 157: \"80year-old female admitted to hospital with COVID-19. Orlando Barber has a past medical history of depression.  She tested positive for Covid about 10 days ago. Orlando Barber apparently was taking ivermectin at home. Orlando Barber came to the emergency room and had a saturation of 70%.  She was admitted to hospital and then transferred the ICU.  She is presently on Vapotherm.  Pulmonologist discussed with patient and family and apparently she wanted to be a DNI. Ashleigh Meier came and told me later on that she was considering intubation.  She is unvaccinated. 12/3-was 100% Vapotherm during rounds.  She was down to 80% Vapotherm in the prone position but had to be made supine for PICC line insertion.  Plan is to reprone her and try to decrease FiO2.  She is on Precedex  12/4-patient has been manually self proning.  FiO2 down to 80%.  On 40 L/min.  Refused BiPAP overnight   12/5- used Bipap last night. On 80% vapo therm. Feeling about the same.    12/6  Currently on Vapotherm 100% 40 L   BiPAP overnight  12/7  Now on Vapotherm 70% FiO2 40 L/min  Used BiPAP overnight  She is  self proning\"    Home Health S4 Level Recommendation: Level 1 Standard  AM-PAC Mobility Score   AM-PAC Inpatient Mobility Raw Score : 16  AM-PAC Inpatient Mobility without Stair Climbing Raw Score : 15    Treatment Time:  15:05-15:31  Treatment number: 3  Timed Code Treatment Minutes: 26 minutes  Total Treatment Minutes:  26  minutes    Cognition    A&O x4   Able to follow 2 step commands    Subjective  Patient lying supine in bed with family at bedside. Pt agreeable to this PT tx. Pain   No  Location:   Rating:    NA/10  Pain Medicine Status: No request made     Bed Mobility   Supine to Sit:    Supervision with HOB elevated  Sit to Supine:   Not Tested  Rolling:   Not Tested  Scooting:   Supervision    Transfer Training     Sit to stand:   CGA  Stand to sit:   CGA  Bed to Chair:   CGA with use of rolling walker (RW)     Pre-Gait Training:  Standing marches x 8 reps     Gait Training gait completed as indicated below  Distance:      4 ft (bed>chair with 90* turn)  Deviations (firm surface/linoleum):  decreased alysha and decreased step length bilaterally  Assistive Device Used:    rolling walker (RW)  Level of Assist:    CGA  Comment: mild cues for navigation    Stair Training deferred, pt unsafe/not appropriate to complete stairs at this time  # of Steps:   N/A  Level of Assist:  Not Tested  UE Support:  NA  Assistive Device:  N/A  Pattern:   N/A  Comments:     Therapeutic Exercise all completed bilaterally unless indicated  heel rocks in hooklying x 15 reps   MIGUEL ANGEL reach while sitting EOB 2 x 10 reps, BUE reach 2 x 3 reps  Ankle pumps x 10 reps  LAQ x 2 reps  Trunk rotation in sitting x 10 reps  Cervical rotation x 10 reps    Balance  Sitting:  Good ; Supervision  Comments: ~ 10 min at EOB. Good posture. Able to rotate trunk WFL. Able to reach fwd outside KENISHA ~6\". Standing: Fair +; CGA  Comments: ~1 minute initial static stand at EOB for pre-gait activities before transferring to chair.     Patient Education      Role of PT, POC, Discharge recommendations, safety awareness, energy conservation, pacing activity and HEP. Positioning Needs       Pt up in chair, OT in room , positioned in proper neutral alignment and pressure relief provided. Call light provided and all needs within reach    Activity Tolerance   Pt completed therapy session with No adverse symptoms noted w/activity. BP (mmHg) HR (bpm) SpO2 (%) Comments   Supine, at rest       Seated at EOB with UE reach exercises  110's Min 86% c/o of feeling \"weird\", not dizzy   After bed>chair transfer  118 87% No c/o       Other  N/A    Assessment :  Patient continues to progress well with all therapy goals. Sitting and standing balance is improving. She transfers bed>chair with RW at [facility] with light cues. SpO2 to mid-80%'s with light activity, pt uses PLB for recovery without need of cues. She participates well and is motivated to recover to PLOF. Recommending ARU/IRF/Inpatient Rehab Facility upon discharge as patient functioning well below baseline, demonstrates good rehab potential and unable to return home due to inability to negotiate stairs to enter home/bedroom/bathroom and home environment not conducive to patient recovery. Pt very motivated to participate and return to her baseline level of function. Pt reports supportive family members. Goals (all goals ongoing unless otherwise indicated)  To be met in 3 visits:  1). Independent with LE Ex x 10 reps - 12/15 progressing     To be met in 6 visits:  1). Supine to/from sit: Supervision  2). Sit to/from stand: Supervision  3). Bed to chair: Supervision  4). Gait: Ambulate 25 ft.   with  SBA and use of LRAD (least restrictive assistive device)  5). Tolerate B LE exercises 3 sets of 10-15 reps  6). Ascend/descend 2 steps with SBA with use of hand rail unilateral and LRAD (least restrictive assistive device)    Plan   3-5x/wk.     Signature: Suhail Dowell, PT, DPT      If patient discharges from this facility prior to next visit, this note will serve as the Discharge Summary.

## 2021-12-15 NOTE — PROGRESS NOTES
Patient Shift Handoff complete     VSS at this time, Blood pressure 109/64, pulse 72, temperature 98.6 °F (37 °C), temperature source Bladder, resp. rate 16, height 5' 4\" (1.626 m), weight 140 lb (63.5 kg), SpO2 91 %, not currently breastfeeding. NSR on monitor     Bedside report and transfer of care to [facility], patient visualized at this time with patient check off.      Currently on Vapotherm 30 L at 60%. No infusions at this time. Patient denied pain or discomfort with no requests or concerns.      Electronically signed by Mike Neff RN on 12/15/2021 at 7:31 AM

## 2021-12-15 NOTE — PROGRESS NOTES
Patient resting comfortably in bed. Shift assessment competed. PIV's and PICC flushing well. Lee draining clear yellow urine. Vapo therm settings at 30L and 70%. Call light within reach. Will continue to monitor.

## 2021-12-15 NOTE — PROGRESS NOTES
Updated daughter Arthor Stack of patient condition and any changes in status or plan of care. Notified and will relay presence during rounding with provider this AM. satisfied with progress and care at this time. No questions or concerns.      Electronically signed by Ángela Flanagan RN on 12/15/2021 at 6:46 AM

## 2021-12-15 NOTE — PROGRESS NOTES
12/14/21 2300   NIV Type   NIV Started/Stopped On   Equipment Type v60   Mode Bilevel   Mask Type Total face   Mask Size Medium   Settings/Measurements   IPAP 12 cmH20   CPAP/EPAP 8 cmH2O   Rate Ordered 16   Resp 16   FiO2  65 %  (decreased to 60)   I Time/ I Time % 1 s   Vt Exhaled 418 mL   Minute Volume 7.9 Liters   Mask Leak (lpm) 22 lpm   Comfort Level Good   Using Accessory Muscles No   SpO2 97

## 2021-12-15 NOTE — CARE COORDINATION
INTERDISCIPLINARY PLAN OF CARE CONFERENCE    Date/Time: 12/15/2021 2:05 PM  Completed by: Sarah Mccullough RN, Case Management      Patient Name:  Ana Laura Jones  YOB: 1950  Admitting Diagnosis: Acute respiratory disease due to COVID-19 virus [U07.1, J06.9]  Acute respiratory failure due to COVID-19 ([address] [U07.1, J96.00]     Admit Date/Time:  12/1/2021  4:32 AM    Chart reviewed. Interdisciplinary team contacted or reviewed plan related to patient progress and discharge plans. Disciplines included Case Management, Nursing, and Dietitian. Current Status:  PT/OT recommendation for discharge plan of care: ARU (last seen 12/13)    Expected D/C Disposition:  Home  Confirmed plan with patient and/or family Yes confirmed with: (name) withpt Melissa carranza  Met with:pt dtr, [facility] Tuan Figueroa via telephone  Discharge Plan Comments: CM spoke with pt Melissa carranza, via telephone. [address] [place] [address] Henry is aware of PT/OT recommendation from 12/13 for ARU. Melissa states that she and her mom both want for pt to return home with Melissa at discharge with home therapy. Melissa would like a referral called to [facility] for [facility] services. Melissa states that if md feels this is very unsafe then they will consider rehab but feels they can provide the care the pt needs at home. Melissa states that pt will have 24/hr assistance at home with her and that her mother is generally very healthy and active and she expects that pt will do well at home with her. Referral called to Nate Callahan with [facility] acute hospital at this time who states that they will follow with pt. Pt is currently on vapotherm and does not have home O2 prior to admit. CM also following for poss home O2 needs.      Home O2 in place on admit: No  Pt informed of need to bring portable home O2 tank on day of discharge for nursing to connect prior to leaving:  Not Indicated  Verbalized agreement/Understanding:  Not Indicated

## 2021-12-15 NOTE — PROGRESS NOTES
Physical Therapy / Occupational Therapy    Attempted to see pt for PT/OT followup session. Pt found sleeping in bed with sleep mask over eyes. Daughter in room says she is very tired and requests later return. Pt then awakens and confirms that she would like to defer to later today. Daughter also states in general she would prefer late afternoon therapy sessions ongoing as this is the time she usually gets bored and restless. Will r-eattempt as able.     Debra Cowan, PT, DPT  Demetrius Rojas, OTR/L

## 2021-12-16 ENCOUNTER — APPOINTMENT (OUTPATIENT)
Dept: GENERAL RADIOLOGY | Age: 71
DRG: 177 | End: 2021-12-16
Payer: MEDICARE

## 2021-12-16 LAB
ALBUMIN SERPL-MCNC: 3.2 G/DL (ref 3.4–5)
ANION GAP SERPL CALCULATED.3IONS-SCNC: 6 MMOL/L (ref 3–16)
BUN BLDV-MCNC: 15 MG/DL (ref 7–20)
CALCIUM SERPL-MCNC: 8.9 MG/DL (ref 8.3–10.6)
CHLORIDE BLD-SCNC: 104 MMOL/L (ref 99–110)
CO2: 30 MMOL/L (ref 21–32)
CREAT SERPL-MCNC: <0.5 MG/DL (ref 0.6–1.2)
GFR AFRICAN AMERICAN: >60
GFR NON-AFRICAN AMERICAN: >60
GLUCOSE BLD-MCNC: 91 MG/DL (ref 70–99)
HCT VFR BLD CALC: 37.5 % (ref 36–48)
HEMOGLOBIN: 12.5 G/DL (ref 12–16)
MCH RBC QN AUTO: 31.5 PG (ref 26–34)
MCHC RBC AUTO-ENTMCNC: 33.2 G/DL (ref 31–36)
MCV RBC AUTO: 94.8 FL (ref 80–100)
PDW BLD-RTO: 14.2 % (ref 12.4–15.4)
PHOSPHORUS: 4.2 MG/DL (ref 2.5–4.9)
PLATELET # BLD: 387 K/UL (ref 135–450)
PMV BLD AUTO: 7.5 FL (ref 5–10.5)
POTASSIUM SERPL-SCNC: 4 MMOL/L (ref 3.5–5.1)
RBC # BLD: 3.96 M/UL (ref 4–5.2)
SODIUM BLD-SCNC: 140 MMOL/L (ref 136–145)
WBC # BLD: 6.3 K/UL (ref 4–11)

## 2021-12-16 PROCEDURE — 99233 SBSQ HOSP IP/OBS HIGH 50: CPT | Performed by: INTERNAL MEDICINE

## 2021-12-16 PROCEDURE — 94660 CPAP INITIATION&MGMT: CPT

## 2021-12-16 PROCEDURE — 6370000000 HC RX 637 (ALT 250 FOR IP): Performed by: INTERNAL MEDICINE

## 2021-12-16 PROCEDURE — 94761 N-INVAS EAR/PLS OXIMETRY MLT: CPT

## 2021-12-16 PROCEDURE — 2700000000 HC OXYGEN THERAPY PER DAY

## 2021-12-16 PROCEDURE — 6360000002 HC RX W HCPCS: Performed by: INTERNAL MEDICINE

## 2021-12-16 PROCEDURE — 2580000003 HC RX 258: Performed by: INTERNAL MEDICINE

## 2021-12-16 PROCEDURE — 85027 COMPLETE CBC AUTOMATED: CPT

## 2021-12-16 PROCEDURE — 6370000000 HC RX 637 (ALT 250 FOR IP): Performed by: NURSE PRACTITIONER

## 2021-12-16 PROCEDURE — 99232 SBSQ HOSP IP/OBS MODERATE 35: CPT | Performed by: INTERNAL MEDICINE

## 2021-12-16 PROCEDURE — 71045 X-RAY EXAM CHEST 1 VIEW: CPT

## 2021-12-16 PROCEDURE — 80069 RENAL FUNCTION PANEL: CPT

## 2021-12-16 PROCEDURE — 2060000000 HC ICU INTERMEDIATE R&B

## 2021-12-16 RX ORDER — BENZONATATE 100 MG/1
200 CAPSULE ORAL 3 TIMES DAILY PRN
Status: DISCONTINUED | OUTPATIENT
Start: 2021-12-16 | End: 2021-12-23 | Stop reason: HOSPADM

## 2021-12-16 RX ORDER — MECOBALAMIN 5000 MCG
10 TABLET,DISINTEGRATING ORAL NIGHTLY PRN
Status: DISCONTINUED | OUTPATIENT
Start: 2021-12-16 | End: 2021-12-23 | Stop reason: HOSPADM

## 2021-12-16 RX ADMIN — SODIUM CHLORIDE, PRESERVATIVE FREE 10 ML: 5 INJECTION INTRAVENOUS at 20:12

## 2021-12-16 RX ADMIN — ESCITALOPRAM OXALATE 5 MG: 10 TABLET ORAL at 09:54

## 2021-12-16 RX ADMIN — BENZONATATE 200 MG: 100 CAPSULE ORAL at 15:14

## 2021-12-16 RX ADMIN — FAMOTIDINE 20 MG: 20 TABLET ORAL at 20:09

## 2021-12-16 RX ADMIN — ENOXAPARIN SODIUM 30 MG: 100 INJECTION SUBCUTANEOUS at 20:09

## 2021-12-16 RX ADMIN — BENZONATATE 200 MG: 100 CAPSULE ORAL at 09:54

## 2021-12-16 RX ADMIN — LEVOTHYROXINE SODIUM 50 MCG: 0.03 TABLET ORAL at 05:04

## 2021-12-16 RX ADMIN — Medication 10 MG: at 20:09

## 2021-12-16 RX ADMIN — FAMOTIDINE 20 MG: 20 TABLET ORAL at 09:55

## 2021-12-16 RX ADMIN — DEXAMETHASONE SODIUM PHOSPHATE 3 MG: 4 INJECTION, SOLUTION INTRAMUSCULAR; INTRAVENOUS at 10:03

## 2021-12-16 RX ADMIN — LIOTHYRONINE SODIUM 5 MCG: 5 TABLET ORAL at 09:55

## 2021-12-16 RX ADMIN — DEXAMETHASONE 2 MG: 1 TABLET ORAL at 20:09

## 2021-12-16 RX ADMIN — ENOXAPARIN SODIUM 30 MG: 100 INJECTION SUBCUTANEOUS at 09:54

## 2021-12-16 RX ADMIN — SODIUM CHLORIDE, PRESERVATIVE FREE 10 ML: 5 INJECTION INTRAVENOUS at 09:56

## 2021-12-16 RX ADMIN — MULTIPLE VITAMINS W/ MINERALS TAB 1 TABLET: TAB at 09:54

## 2021-12-16 RX ADMIN — QUETIAPINE FUMARATE 12.5 MG: 25 TABLET ORAL at 09:54

## 2021-12-16 RX ADMIN — PSYLLIUM HUSK 1 PACKET: 3.4 POWDER ORAL at 09:54

## 2021-12-16 RX ADMIN — QUETIAPINE FUMARATE 12.5 MG: 25 TABLET ORAL at 20:09

## 2021-12-16 RX ADMIN — ERGOCALCIFEROL 50000 UNITS: 1.25 CAPSULE ORAL at 09:55

## 2021-12-16 ASSESSMENT — PAIN SCALES - GENERAL
PAINLEVEL_OUTOF10: 0
PAINLEVEL_OUTOF10: 0

## 2021-12-16 NOTE — PROGRESS NOTES
Shift assessment completed. See flow sheet. Medications given. Patient is A&O x4. Per MD Lee removed. Vapo therm settings at 35L and 65%. PICC and PIV's patent. Call light within reach. Will continue to monitor.

## 2021-12-16 NOTE — PROGRESS NOTES
Pulmonary & Critical Care Medicine ICU Progress Note    CC: COVID-19    Events of Last 24 hours:   Vapotherm 35 LPM/65% FiO2; using bipap qhs;  hypoxic with movement    Vascular lines: IV: PICC 12/3    MV:       / / /FiO2 : 65 %  No results for input(s): PHART, [id number], PO2ART in the last 72 hours. IV:   sodium chloride Stopped (12/03/21 1632)     Vitals:  Blood pressure 112/65, pulse 106, temperature 98.6 °F (37 °C), temperature source Oral, resp. rate 24, height 5' 4\" (1.626 m), weight 141 lb 3.2 oz (64 kg), SpO2 93 %, not currently breastfeeding. On Vapotherm    Constitutional:  No acute distress. Eyes: PERRL. Conjunctivae anicteric. ENT: Normal nose. Normal tongue. Neck:  Trachea is midline. No thyroid tenderness. Respiratory: No accessory muscle usage. Decreased breath sounds. No wheezes. + rales. No Rhonchi. Cardiovascular: Normal S1S2. No digit clubbing. No digit cyanosis. No LE edema. Psychiatric: No anxiety or Agitation. Alert and Oriented to person, place and time.     Scheduled Meds:   dexamethasone  2 mg Oral 2 times per day    influenza virus vaccine  0.5 mL IntraMUSCular Prior to discharge    escitalopram  5 mg Oral Daily    QUEtiapine  12.5 mg Oral BID    psyllium  1 packet Oral Daily    vitamin D  50,000 Units Oral Weekly    famotidine  20 mg Oral BID    sodium chloride flush  5-40 mL IntraVENous 2 times per day    enoxaparin  30 mg SubCUTAneous BID    levothyroxine  50 mcg Oral Daily    liothyronine  5 mcg Oral Daily    therapeutic multivitamin-minerals  1 tablet Oral Daily       Data:  CBC:   Recent Labs     12/14/21  0400 12/15/21  0510 12/16/21  0515   WBC 8.9 7.5 6.3   HGB 11.7* 12.3 12.5   HCT 34.0* 36.6 37.5   MCV 93.6 94.1 94.8    386 387     BMP:   Recent Labs     12/14/21  0400 12/15/21  0510 12/16/21  0515    140 140   K 3.7 3.4* 4.0    103 104   CO2 30 29 30   PHOS  --   --  4.2   BUN 14 12 15   CREATININE <0.5* <0.5* <0.5*     LIVER PROFILE:   Recent Labs     12/14/21  0400 12/15/21  0510   AST 43* 41*   ALT 41* 50*   BILITOT 0.4 0.4   ALKPHOS 94 90     Microbiology:  12/1 BC NGTD  12/10 Resp NGTD   12/10 BC NGTD   12/10  NGTD     Imaging:  CTPA 12/6 imaging reviewed by me and showed  Diffuse bilateral airspace disease-worsening    ASSESSMENT:  · Acute hypoxemic respiratory failure -severe progressive life-threatening  · COVID-19 viral pneumonia  · ARDS  · Not vaccinated for COVID-19      PLAN:  Vapotherm for life-threatening acute hypoxemic respiratory failure and titrate to maintain SaO2 >90%  QHS/PRN Bipap  Completed Zosyn D#5 and prior Levaquin D#5  Droplet plus isolation  Dexamethasone IV D#15 at 2mg, monitor blood glucose closely  Tocilizumab 12/3/21.  Post 2 doses of Baricitinib   Tessalon TID   PT/OT  DVT prophylaxis: Lovenox BID

## 2021-12-16 NOTE — PROGRESS NOTES
12/16/21 0300   NIV Type   Equipment Type V60   Mode Bilevel   Mask Type Total face   Mask Size Medium   Settings/Measurements   IPAP 12 cmH20   CPAP/EPAP 8 cmH2O   Rate Ordered 16   Resp 26   FiO2  60 %   Vt Exhaled 411 mL   Mask Leak (lpm) 39 lpm   Comfort Level Good   Using Accessory Muscles No   SpO2 97   Alarm Settings   Alarms On Y

## 2021-12-16 NOTE — PROGRESS NOTES
Hospitalist Progress Note      PCP: Marilyn Moscoso MD    Date of Admission: 12/1/2021     covid pna, unvaccinated  Worsened hypoxia   using BIPAP overnight, now on vapotherm  Worked with PT      Subjective:     Ms Sandoval seen in ICU bed, remains  on vapotherm but improving now at 65 % fio2  Working with PT and doing better    Used bipap overnight    Medications:  Reviewed    Infusion Medications    sodium chloride Stopped (12/03/21 1632)     Scheduled Medications    dexamethasone  2 mg Oral 2 times per day    dexamethasone  3 mg IntraVENous Q24H    alteplase  1 mg IntraCATHeter Once    benzonatate  200 mg Oral TID    influenza virus vaccine  0.5 mL IntraMUSCular Prior to discharge    escitalopram  5 mg Oral Daily    QUEtiapine  12.5 mg Oral BID    psyllium  1 packet Oral Daily    lidocaine 1 % injection  5 mL IntraDERmal Once    vitamin D  50,000 Units Oral Weekly    famotidine  20 mg Oral BID    sodium chloride flush  5-40 mL IntraVENous 2 times per day    enoxaparin  30 mg SubCUTAneous BID    levothyroxine  50 mcg Oral Daily    liothyronine  5 mcg Oral Daily    therapeutic multivitamin-minerals  1 tablet Oral Daily     PRN Meds: albuterol sulfate HFA, sodium chloride, oxyCODONE, bismuth subsalicylate, melatonin, sodium chloride flush, sodium chloride, ondansetron **OR** ondansetron, polyethylene glycol, acetaminophen **OR** acetaminophen, guaiFENesin-dextromethorphan      Intake/Output Summary (Last 24 hours) at 12/16/2021 0715  Last data filed at 12/16/2021 4848  Gross per 24 hour   Intake 180 ml   Output 2150 ml   Net -1970 ml       Physical Exam Performed:    /65   Pulse 78   Temp 98 °F (36.7 °C) (Axillary)   Resp 25   Ht 5' 4\" (1.626 m)   Wt 141 lb 3.2 oz (64 kg)   SpO2 95%   BMI 24.24 kg/m²         General: thin  appearing elderly female, ill appearing    Awake, alert and oriented.  Appears to be not in any distress  Mucous Membranes:  Pink , anicteric  Neck: No JVD, no carotid bruit, no thyromegaly  Chest improving  shawn air entry with scattered crackles  Cardiovascular:  RRR S1S2 heard, no murmurs or gallops  Abdomen:  Soft, undistended, non tender, no organomegaly, BS present  Extremities: No edema or cyanosis. Distal pulses well felt  Neurological : grossly normal with improving gen weakness      Labs:   Recent Labs     12/14/21  0400 12/15/21  0510 12/16/21  0515   WBC 8.9 7.5 6.3   HGB 11.7* 12.3 12.5   HCT 34.0* 36.6 37.5    386 387     Recent Labs     12/14/21  0400 12/15/21  0510 12/16/21  0515    140 140   K 3.7 3.4* 4.0    103 104   CO2 30 29 30   BUN 14 12 15   CREATININE <0.5* <0.5* <0.5*   CALCIUM 8.5 8.6 8.9   PHOS  --   --  4.2     Recent Labs     12/14/21  0400 12/15/21  0510   AST 43* 41*   ALT 41* 50*   BILITOT 0.4 0.4   ALKPHOS 94 90     No results for input(s): INR in the last 72 hours. No results for input(s): Marisa Divine in the last 72 hours. Urinalysis:      Lab Results   Component Value Date    NITRU Negative 12/10/2021    BLOODU LARGE 12/10/2021    SPECGRAV 1.025 12/10/2021    GLUCOSEU Negative 12/10/2021       Radiology:  XR CHEST PORTABLE   Final Result   Mild interval increase in airspace opacities involving the right mid to lower   lung field and left mid to upper lung field. Mild decrease in the airspace   opacities in the left mid to lower lung field. This is when compared to the   prior study performed 12/06/2021. XR CHEST PORTABLE   Final Result   Moderate diffuse airspace disease compatible with multifocal pneumonia with   asymmetric involvement and consolidation left lower lobe. This could   represent COVID pneumonia with superimposed edema. IR PICC WO SQ PORT/PUMP > 5 YEARS   Final Result   Successful placement of PICC line. CT CHEST PULMONARY EMBOLISM W CONTRAST   Final Result   No evidence of pulmonary embolism.       Extensive bilateral multifocal airspace disease compatible with multifocal   pneumonia, specifically COVID pneumonia. XR CHEST PORTABLE   Final Result   Multifocal peripherally located pulmonary opacities are seen, likely related   to COVID-19 pneumonia. Sputum - NRF     Assessment/Plan:      COVID PNA  Acute Hypoxic Respiratory Failure/ARDS    - Pt is unvaccinated  - imaging with bilateral infiltrates   - does not wear O2 at baseline  - Admit to Med Surg, droplet + precautions, tele- progressive hypoxia needing ICU transfer and currently on vapotherm, alternating with bipap  - treated with  Decadron , 2 mg ,  Status post Tocilizumab 12/3/2021. Post 2 doses of baricitinib PRN Robitussin  - lovenox bid   - slowly improving hypoxia    Possible sec bacterial infection  - risk for gram neg infection   - completed levaquin for 5 days, later on zosyn for 5 days - off now   - cx remain neg         Severe anxiety - was on precedex , now changed  To seroquel bid  Resumed lexapro    #Acquired hypothyroidism. Continue home medication.          dvt prophylaxis with lovenox bid  Start ambulation  Out of bed  Remove yoo    Diet: ADULT DIET; Regular  ADULT ORAL NUTRITION SUPPLEMENT; Breakfast, Lunch, Dinner;  Low Calorie/High Protein Oral Supplement  Code Status: DNR-CCA    PT/OT Eval Status: ordered    Out of bed      Jesus Quintero MD, 12/16/2021 7:15 AM

## 2021-12-16 NOTE — PROGRESS NOTES
Bedside report and transfer of care given to [facility]. Pt currently resting in bed with the call light within reach. Pt denies any other care needs at this time. Pt stable at this time.

## 2021-12-16 NOTE — PROGRESS NOTES
Daughter Nicki July updated on pt's status. Per daughter, pt takes 10mg of melatonin at home and pt currently has 5mg ordered here. Pt's daughter would like that updated, if possible. Pt's daughter Nicki July also requests that if she is not here in the AM when pulmonology or the attending round that the day shift RN  and relay any pertinent plans to her. Will pass these requests on to day shift.

## 2021-12-16 NOTE — PROGRESS NOTES
12/15/21 6434   NIV Type   Equipment Type V60   Mode Bilevel   Mask Type Total face   Mask Size Medium   Settings/Measurements   IPAP 12 cmH20   CPAP/EPAP 8 cmH2O   Rate Ordered 16   Resp 25   FiO2  60 %   Vt Exhaled 507 mL   Mask Leak (lpm) 26 lpm   Comfort Level Good   Using Accessory Muscles No   SpO2 96   Alarm Settings   Alarms On Y

## 2021-12-16 NOTE — PROGRESS NOTES
12/15/21 2025   NIV Type   Skin Assessment Clean, dry, & intact   Skin Protection for O2 Device N/A   NIV Started/Stopped On   Equipment Type V60   Mode Bilevel   Mask Type Total face   Mask Size Medium   Settings/Measurements   IPAP 12 cmH20   CPAP/EPAP 8 cmH2O   Rate Ordered 16   Resp 25   FiO2  60 %   Vt Exhaled 455 mL   Mask Leak (lpm) 21 lpm   Comfort Level Good   Using Accessory Muscles No   SpO2 95   Alarm Settings   Alarms On Y

## 2021-12-17 PROCEDURE — 99233 SBSQ HOSP IP/OBS HIGH 50: CPT | Performed by: INTERNAL MEDICINE

## 2021-12-17 PROCEDURE — 6370000000 HC RX 637 (ALT 250 FOR IP): Performed by: INTERNAL MEDICINE

## 2021-12-17 PROCEDURE — 2580000003 HC RX 258: Performed by: INTERNAL MEDICINE

## 2021-12-17 PROCEDURE — 2060000000 HC ICU INTERMEDIATE R&B

## 2021-12-17 PROCEDURE — 97535 SELF CARE MNGMENT TRAINING: CPT

## 2021-12-17 PROCEDURE — 94761 N-INVAS EAR/PLS OXIMETRY MLT: CPT

## 2021-12-17 PROCEDURE — 6360000002 HC RX W HCPCS: Performed by: INTERNAL MEDICINE

## 2021-12-17 PROCEDURE — 99232 SBSQ HOSP IP/OBS MODERATE 35: CPT | Performed by: INTERNAL MEDICINE

## 2021-12-17 PROCEDURE — 97110 THERAPEUTIC EXERCISES: CPT

## 2021-12-17 PROCEDURE — 94660 CPAP INITIATION&MGMT: CPT

## 2021-12-17 PROCEDURE — 97530 THERAPEUTIC ACTIVITIES: CPT

## 2021-12-17 PROCEDURE — 6370000000 HC RX 637 (ALT 250 FOR IP): Performed by: NURSE PRACTITIONER

## 2021-12-17 RX ORDER — DEXAMETHASONE 1 MG
1 TABLET ORAL EVERY 12 HOURS SCHEDULED
Status: DISCONTINUED | OUTPATIENT
Start: 2021-12-17 | End: 2021-12-21

## 2021-12-17 RX ADMIN — FAMOTIDINE 20 MG: 20 TABLET ORAL at 20:29

## 2021-12-17 RX ADMIN — QUETIAPINE FUMARATE 12.5 MG: 25 TABLET ORAL at 10:07

## 2021-12-17 RX ADMIN — ENOXAPARIN SODIUM 30 MG: 100 INJECTION SUBCUTANEOUS at 20:29

## 2021-12-17 RX ADMIN — DEXAMETHASONE 1 MG: 1 TABLET ORAL at 20:29

## 2021-12-17 RX ADMIN — QUETIAPINE FUMARATE 12.5 MG: 25 TABLET ORAL at 20:30

## 2021-12-17 RX ADMIN — MULTIPLE VITAMINS W/ MINERALS TAB 1 TABLET: TAB at 10:07

## 2021-12-17 RX ADMIN — ENOXAPARIN SODIUM 30 MG: 100 INJECTION SUBCUTANEOUS at 10:07

## 2021-12-17 RX ADMIN — DEXAMETHASONE 2 MG: 1 TABLET ORAL at 10:07

## 2021-12-17 RX ADMIN — LIOTHYRONINE SODIUM 5 MCG: 5 TABLET ORAL at 10:06

## 2021-12-17 RX ADMIN — ESCITALOPRAM OXALATE 5 MG: 10 TABLET ORAL at 10:08

## 2021-12-17 RX ADMIN — LEVOTHYROXINE SODIUM 50 MCG: 0.03 TABLET ORAL at 05:11

## 2021-12-17 RX ADMIN — BISMUTH SUBSALICYLATE 30 ML: 525 LIQUID ORAL at 20:31

## 2021-12-17 RX ADMIN — SODIUM CHLORIDE, PRESERVATIVE FREE 10 ML: 5 INJECTION INTRAVENOUS at 20:56

## 2021-12-17 RX ADMIN — FAMOTIDINE 20 MG: 20 TABLET ORAL at 10:08

## 2021-12-17 RX ADMIN — SODIUM CHLORIDE, PRESERVATIVE FREE 10 ML: 5 INJECTION INTRAVENOUS at 10:08

## 2021-12-17 RX ADMIN — PSYLLIUM HUSK 1 PACKET: 3.4 POWDER ORAL at 10:07

## 2021-12-17 RX ADMIN — Medication 10 MG: at 23:48

## 2021-12-17 NOTE — PROGRESS NOTES
Occupational Therapy Daily Treatment Note    Unit: ICU  Date:  12/17/2021  Patient Name:    Ashley Caldwell  Admitting diagnosis:  Acute respiratory disease due to COVID-19 virus [U07.1, J06.9]  Acute respiratory failure due to COVID-19 [facility]) [U07.1, J96.00]  Admit Date:  12/1/2021  Precautions/Restrictions:  Fall risk, Lines -Supplemental O2 (Vapotherm 25Lpm, 65% FiO2), Telemetry, Continuous pulse oximetry and Isolation Precautions: Droplet Plus - COVID    Treatment Time:  1450-1545  Treatment number:  5  Total Treatment Time:   55 minutes    Patient Goals for Session:  \" get up out of bed, try not to have an oxygen spasm \"      Discharge Recommendations: continue to assess - pt declining ARU/SNF   DME needs for discharge: continue to assess    Patient plans to d/c to dtr's house with 24 hour assist and HHOT/PT. Therapy recommendations for staff:  Assist of 1 with use of gait belt and RW for all transfers to/from BSC/chair    History of Present Illness: Nellie Rivera unvaccinated 80 y.o. female  With Vital signs of /65   Pulse 94   Temp 99.4 °F (37.4 °C) (Oral)   Resp 22   Ht 5' 4\" (1.626 m)   Wt 140 lb (63.5 kg)   SpO2 94%   BMI 24.03 kg/m²  who presents to the ED with a complaint of shortness of breath.  Patient seen and evaluated in room 2.  Patient was diagnosed with Covid 2 weeks ago with a positive Covid test.  Since then she has been doing fine until the this evening when she started to get more short of breath.  She says she tested her pulse oximetry level and it was found to be in the seventies.  But got progressively better.  Upon arrival here in the emergency department her pulse oximetry reading is 94% on a nonrebreather mask.  She denies any chest pain no leg swelling.  Patient was treated with 1 [employer]eb on the way into the emergency department by EMS.  No other complaints, modifying factors or associated symptoms  12/3-was 100% Vapotherm during rounds.  She was down to 80% Patient contacted regarding recent discharge and COVID-19 risk. Discussed COVID-19 related testing which was available at this time. Test results were negative. Patient informed of results, if available? Patient aware of results. Care Transition Nurse/ Ambulatory Care Manager/ LPN Care Coordinator contacted the patient by telephone to perform post discharge assessment. Verified name and  with patient as identifiers. Patient is doing well. She has new medication available (midodrine). She will attend dialysis today. She denies any questions related to discharge. She is slightly upset about issue with [employer] company and receiving her walker that was ordered at discharge. She is waiting for call back. Patient has following risk factors of: chronic kidney disease. CTN/ACM/LPN reviewed discharge instructions, medical action plan and red flags related to discharge diagnosis. Reviewed and educated them on any new and changed medications related to discharge diagnosis. Advised obtaining a 90-day supply of all daily and as-needed medications. Advance Care Planning:   Does patient have an Advance Directive: CTN unable to address with patient at this time. Conversation was limited today as patient is arranging for equipment and preparing to go to dialysis. Education provided regarding infection prevention, and signs and symptoms of COVID-19 and when to seek medical attention with patient who verbalized understanding. Discussed exposure protocols and quarantine from [address] you at higher risk for severe illness  and given an opportunity for questions and concerns. The patient agrees to contact the COVID-19 hotline [phone] or PCP office for questions related to their healthcare. CTN/ACM/LPN provided contact information for future reference. Plan for follow-up call in 7-14 days based on severity of symptoms and risk factors. Vapotherm in the prone position but had to be made supine for PICC line insertion.  Plan is to reprone her and try to decrease FiO2.  She is on Precedex  12/4-patient has been manually self proning.  FiO2 down to 80%.  On 40 L/min.  Refused BiPAP overnight   12/5- used Bipap last night. On 80% vapo therm. Feeling about the same. 12/6  Currently on Vapotherm 100% 40 L   BiPAP overnight  12/7  Now on Vapotherm 70% FiO2 40 L/min  Used BiPAP overnight  She is  self proning\"    Home Health S4 Level Recommendation:  NA    AM-PAC Score: AM-PAC Inpatient Daily Activity Raw Score: 19  Pt scored a 14/24 on the AM-PAC ADL Inpatient form. Current research shows that an AM-PAC score of 17 or less is typically not associated with a discharge to the patient's home setting. Subjective:  Pt in bed, agreeable to therapy. Pain   Yes  Rating: mild  Location: abdomen (reports diarrhea)  Pain Medicine Status: RN notified      Cognition:    A&O Person, Place, Time and Situation   Able to follow 2 step commands, consistently. Balance:  Functional Sitting Balance:   WNL  Functional Standing Balance:dimished     Bed mobility:    Supine to sit:   SBA with HOB elevated  Sit to supine:   Not Tested  Rolling:    Not Tested  Scooting in sitting:  Supervision   Scooting to head of bed:        Not Tested   Bridging:    Not Tested    Transfers:    Sit to stand:  CGA  Stand to sit:  CGA   Bed to chair:   CGA with RW, cues for line management  Standard toilet: Not Tested  Bed to [facility]:  Not Tested    Activities of Daily Living:   UB Dressing:   Not Tested  LB Dressing:    Supervision to don underwear over feet, SBA to pull up in stance   UB Bathing:  Not Tested  LB Bathing:  Not Tested   Feeding:  Not Tested  Grooming:   IND brushing hair  Toileting:  Not Tested    Activity Tolerance:   Pt completed therapy session with mild dizziness during position changes  Up in chair after transfer:  SpO2 88-91% on Vapotherm 25 LPM, 65% O2  HR 100-118    Required extended rest break, reclined in chair, to return to 90%+ for SpO2    After UE exercises:  SpO2 90-94% on Vapotherm 25 LPM, 65% O2  HR 98    Therapeutic Exercise:   Shoulder shrugs 10x  Shoulder retraction 10x   Shoulder rolls 10x     Patient Education:   Role of OT  Recommendations for DC  Instructed in the use of energy conservation-PLB/\"soup breathing\"  Recommend up to chair with staff assist for meals, issued pressure relieving cushion and patient reports chair more comfortable. Positioning Needs:   Reclined in chair with call light and needs in reach. Alarm Set    Family Present:  No    Assessment: Pt making progress toward goals. Continue. GOALS  To be met in 3 Visits:  1). Bed to toilet/BSC: Min A with AD as needed      To be met in 5 Visits:  1). Supine to/from Sit:             Modified Independent  2). Upper Body Bathing:         Supervision  3). Lower Body Bathing:         Min A - goal met - new goal sup  4). Upper Body Dressing:       Supervision  5). Lower Body Dressing:       Min A -goal met - new goal sup-ind   6).  Pt to demonstrate UE exs x 15 reps with minimal cues       Plan: cont with [address], OTR/L 5598        If patient discharges from this facility prior to next visit, this note will serve as the Discharge Summary

## 2021-12-17 NOTE — CARE COORDINATION
INTERDISCIPLINARY PLAN OF CARE CONFERENCE    Date/Time: 12/17/2021 2:45 PM  Completed by: Ricco Mckinney RN, Case Management      Patient Name:  Brittaney Stein  YOB: 1950  Admitting Diagnosis: Acute respiratory disease due to COVID-19 virus [U07.1, J06.9]  Acute respiratory failure due to COVID-19 ([address] [U07.1, J96.00]     Admit Date/Time:  12/1/2021  4:32 AM    Chart reviewed. Interdisciplinary team contacted or reviewed plan related to patient progress and discharge plans. Disciplines included Case Management, Nursing, and Dietitian. Current Status:ongoing  PT/OT recommendation for discharge plan of care: ARU    Expected D/C Disposition:  Home  Confirmed plan with patient and/or family Yes confirmed with: (name) pt's daughterDorinda with:pt's daughter, Case  Discharge Plan Comments: Reviewed chart. Role of discharge planner explained and patient's daughter, Nelson Almaguer, verbalized understanding. Pt is form home alone and was independent prior to admission. CM spoke with Nelson Tripp via phone, as pt was not picking up her bedside phone. Pt is covid pos as of 12/1/2021. Pt is currently on Vapotherm. Nelson Almaguer is aware that PT/OT recommends ARU. Nelson Almaguer declines ARU and states that she plans for pt to come to her home at [address], [address] at d/c and plans to have [facility] for PT/OT/SN and [facility] this. Pt is not on home O2.        Home O2 in place on admit: No  Pt informed of need to bring portable home O2 tank on day of discharge for nursing to connect prior to leaving:  No  Verbalized agreement/Understanding:  No

## 2021-12-17 NOTE — PROGRESS NOTES
Bedside report and transfer of care given to [facility]. Pt currently resting in bed with the call light within reach. Pt denies any other care needs at this time. Pt stable at this time.

## 2021-12-17 NOTE — PROGRESS NOTES
Pulmonary & Critical Care Medicine ICU Progress Note    CC: COVID-19    Events of Last 24 hours:   Vapotherm 35 LPM/65% FiO2; using bipap qhs  Vascular lines: IV: PICC 12/3    MV:       / / /FiO2 : 65 %  No results for input(s): PHART, QZF7MWE, PO2ART in the last 72 hours. IV:   sodium chloride Stopped (12/03/21 1632)     Vitals:  Blood pressure 111/75, pulse 100, temperature 98 °F (36.7 °C), temperature source Oral, resp. rate 18, height 5' 4\" (1.626 m), weight 137 lb 9.6 oz (62.4 kg), SpO2 94 %, not currently breastfeeding. On Vapotherm    Constitutional:  No acute distress. Eyes: PERRL. Conjunctivae anicteric. ENT: Normal nose. Normal tongue. Neck:  Trachea is midline. No thyroid tenderness. Respiratory: No accessory muscle usage. Decreased breath sounds. No wheezes. + rales. No Rhonchi. Cardiovascular: Normal S1S2. No digit clubbing. No digit cyanosis. No LE edema. Psychiatric: No anxiety or Agitation. Alert and Oriented to person, place and time.     Scheduled Meds:   dexamethasone  2 mg Oral 2 times per day    influenza virus vaccine  0.5 mL IntraMUSCular Prior to discharge    escitalopram  5 mg Oral Daily    QUEtiapine  12.5 mg Oral BID    psyllium  1 packet Oral Daily    vitamin D  50,000 Units Oral Weekly    famotidine  20 mg Oral BID    sodium chloride flush  5-40 mL IntraVENous 2 times per day    enoxaparin  30 mg SubCUTAneous BID    levothyroxine  50 mcg Oral Daily    liothyronine  5 mcg Oral Daily    therapeutic multivitamin-minerals  1 tablet Oral Daily       Data:  CBC:   Recent Labs     12/15/21  0510 12/16/21  0515   WBC 7.5 6.3   HGB 12.3 12.5   HCT 36.6 37.5   MCV 94.1 94.8    387     BMP:   Recent Labs     12/15/21  0510 12/16/21  0515    140   K 3.4* 4.0    104   CO2 29 30   PHOS  --  4.2   BUN 12 15   CREATININE <0.5* <0.5*     LIVER PROFILE:   Recent Labs     12/15/21  0510   AST 41*   ALT 50*   BILITOT 0.4   ALKPHOS 90     Microbiology:  12/1 [facility] NGTD  12/10 Resp NGTD   12/10 BC NGTD   12/10  NGTD     Imaging:  CTPA 12/6 imaging reviewed by me and showed  Diffuse bilateral airspace disease-worsening    ASSESSMENT:  · Acute hypoxemic respiratory failure -severe progressive life-threatening  · COVID-19 viral pneumonia  · ARDS  · Not vaccinated for COVID-19      PLAN:  Vapotherm for life-threatening acute hypoxemic respiratory failure and titrate to maintain SaO2 >90%  QHS/PRN Bipap  Completed Zosyn D#5 and prior Levaquin D#5  Droplet plus isolation  Dexamethasone IV D#16 -change to 1mg, monitor blood glucose closely  Tocilizumab 12/3/21.  Post 2 doses of Baricitinib   Tessalon TID   PT/OT  DVT prophylaxis: Lovenox BID

## 2021-12-17 NOTE — PROGRESS NOTES
Hospitalist Progress Note      PCP: Dominga Nicolas MD    Date of Admission: 12/1/2021     covid pna, unvaccinated  Worsened hypoxia   using BIPAP overnight, now on vapotherm  Worked with PT      Subjective:     Ms Goodrich Neighbours is starting to feel much better today  Remains on Vapotherm   O2 requirement decreased from 35 L to 25 L today;  FiO2 65%    She was able to sit up on the chair . denies any dyspnea on exertion today    Working with PT and doing better    Used bipap overnight    Medications:  Reviewed    Infusion Medications    sodium chloride Stopped (12/03/21 1632)     Scheduled Medications    dexamethasone  1 mg Oral 2 times per day    influenza virus vaccine  0.5 mL IntraMUSCular Prior to discharge    escitalopram  5 mg Oral Daily    QUEtiapine  12.5 mg Oral BID    psyllium  1 packet Oral Daily    vitamin D  50,000 Units Oral Weekly    famotidine  20 mg Oral BID    sodium chloride flush  5-40 mL IntraVENous 2 times per day    enoxaparin  30 mg SubCUTAneous BID    levothyroxine  50 mcg Oral Daily    liothyronine  5 mcg Oral Daily    therapeutic multivitamin-minerals  1 tablet Oral Daily     PRN Meds: benzonatate, melatonin, albuterol sulfate HFA, sodium chloride, oxyCODONE, bismuth subsalicylate, sodium chloride flush, sodium chloride, ondansetron **OR** ondansetron, polyethylene glycol, acetaminophen **OR** acetaminophen, guaiFENesin-dextromethorphan      Intake/Output Summary (Last 24 hours) at 12/17/2021 1351  Last data filed at 12/17/2021 1048  Gross per 24 hour   Intake 600 ml   Output 1050 ml   Net -450 ml       Physical Exam Performed:    /75   Pulse 100   Temp 98 °F (36.7 °C) (Oral)   Resp 18   Ht 5' 4\" (1.626 m)   Wt 137 lb 9.6 oz (62.4 kg)   SpO2 94%   BMI 23.62 kg/m²   Patient seen in droplet plus precautions    General: thin  appearing elderly female, ill appearing    Awake, alert and oriented.  Appears to be not in any distress   Mucous Membranes:  Pink , anicteric  Neck: No JVD, no carotid bruit, no thyromegaly  Chest: improving  shawn air entry with scattered crackles  Cardiovascular:  RRR S1S2 heard, no murmurs or gallops  Abdomen:  Soft, undistended, non tender, no organomegaly, BS present  Extremities: No edema or cyanosis. Distal pulses well felt  Neurological : grossly normal with improving gen weakness      Labs:   Recent Labs     12/15/21  0510 12/16/21  0515   WBC 7.5 6.3   HGB 12.3 12.5   HCT 36.6 37.5    387     Recent Labs     12/15/21  0510 12/16/21  0515    140   K 3.4* 4.0    104   CO2 29 30   BUN 12 15   CREATININE <0.5* <0.5*   CALCIUM 8.6 8.9   PHOS  --  4.2     Recent Labs     12/15/21  0510   AST 41*   ALT 50*   BILITOT 0.4   ALKPHOS 90     No results for input(s): INR in the last 72 hours. No results for input(s): Pat Napoleon in the last 72 hours. Urinalysis:      Lab Results   Component Value Date    NITRU Negative 12/10/2021    BLOODU LARGE 12/10/2021    SPECGRAV 1.025 12/10/2021    GLUCOSEU Negative 12/10/2021     Cultures  Sputum - NRF   Blood cultures negative      Radiology:  XR CHEST PORTABLE   Final Result   Mild interval increase in airspace opacities involving the right mid to lower   lung field and left mid to upper lung field. Mild decrease in the airspace   opacities in the left mid to lower lung field. This is when compared to the   prior study performed 12/06/2021. XR CHEST PORTABLE   Final Result   Moderate diffuse airspace disease compatible with multifocal pneumonia with   asymmetric involvement and consolidation left lower lobe. This could   represent COVID pneumonia with superimposed edema. IR PICC WO SQ PORT/PUMP > 5 YEARS   Final Result   Successful placement of PICC line. CT CHEST PULMONARY EMBOLISM W CONTRAST   Final Result   No evidence of pulmonary embolism.       Extensive bilateral multifocal airspace disease compatible with multifocal   pneumonia, specifically COVID pneumonia. XR CHEST PORTABLE   Final Result   Multifocal peripherally located pulmonary opacities are seen, likely related   to COVID-19 pneumonia. Assessment/Plan:    COVID PNA  Acute Hypoxic Respiratory Failure/ARDS    - Pt is unvaccinated  - imaging with bilateral infiltrates   - does not wear O2 at baseline  - Admit to Med Surg, droplet + precautions, tele  - progressive hypoxia needing ICU transfer and currently on vapotherm, alternating with bipap  - being treated with Decadron , 2 mg , day #16. Status post Tocilizumab 12/3/2021. Post 2 doses of baricitinib PRN Robitussin  - lovenox bid   - slowly improving hypoxia    Possible sec bacterial infection  - risk for gram neg infection   - completed levaquin for 5 days, later on zosyn for 5 days - off now   - cx remain neg      Severe anxiety   - was on precedex , now changed  To seroquel bid  Resumed lexapro    Acquired hypothyroidism. Continue home medication.       dvt prophylaxis with lovenox bid  Start ambulation  Out of bed  Remove yoo    Diet: ADULT DIET; Regular  ADULT ORAL NUTRITION SUPPLEMENT; Breakfast, Lunch, Dinner;  Low Calorie/High Protein Oral Supplement  Code Status: DNR-CCA    PT/OT Eval Status: ordered      Sherrie Bearden MD, 12/17/2021 1:51 PM

## 2021-12-17 NOTE — PROGRESS NOTES
Comprehensive Nutrition Assessment    Type and Reason for Visit:  Reassess    Nutrition Recommendations/Plan:   1. Continue Regular Diet  2. Continue Ensure HP TID    Nutrition Assessment:  patient has mproved from a nutritional standpoint AEB patient consumed adequate po nutrition on x last 4 days; she remains at risk for further compromise d/t  increased nutrition needs r/t COVID-19 virus,  will continue Regular diet order and Ensure high-protein with meals. Malnutrition Assessment:  Malnutrition Status: At risk for malnutrition (Comment)    Context:  Acute Illness     Findings of the 6 clinical characteristics of malnutrition:  Energy Intake:  7 - 50% or less of estimated energy requirements for 5 or more days  Weight Loss:  Unable to assess (only a stated weight was obtained on 12/1/21)     Body Fat Loss:  Unable to assess (COVID-19 +)     Muscle Mass Loss:  Unable to assess (COVID-19 +)    Fluid Accumulation:  No significant fluid accumulation     Strength:  Not Performed    Estimated Daily Nutrient Needs:  Energy (kcal):  1472 - 1600 kcals based on 23-25 kcals/kg/CBW; Weight Used for Energy Requirements:  Current     Protein (g):  77 - 90 g protein based on 1.2-1.4 g/kg/CBW; Weight Used for Protein Requirements:  Current        Fluid (ml/day):  1472 - 1600 ml; Method Used for Fluid Requirements:  1 ml/kcal      Nutrition Related Findings:  po intake > 50%; BM 12/16; Wounds:  None       Current Nutrition Therapies:    ADULT DIET; Regular  ADULT ORAL NUTRITION SUPPLEMENT; Breakfast, Lunch, Dinner;  Low Calorie/High Protein Oral Supplement    Anthropometric Measures:  · Height: 5' 4\" (162.6 cm)  · Current Body Weight: 137 lb 6 oz (62.3 kg)   · Admission Body Weight: 140 lb (63.5 kg) (obtained on 12/1/21; stated weight)    · Usual Body Weight: 146 lb (66.2 kg) (obtained on 6/18/21; unknown whether actual weight or stated/estimated weight)     · Ideal Body Weight: 120 lbs; % Ideal Body Weight 116.7 %   · BMI: 23.6  · Adjusted Body Weight:  ; No Adjustment   · BMI Categories: Normal Weight (BMI 22.0 to 24.9) age over 72       Nutrition Diagnosis:   · Inadequate oral intake related to inadequate protein-energy intake, impaired respiratory function, increase demand for energy/nutrients as evidenced by poor intake prior to admission, intake 26-50%, intake 51-75%, lab values, constipation, other (comment) (COVID-19 +)      Nutrition Interventions:   Food and/or Nutrient Delivery:  Continue Current Diet, Continue Oral Nutrition Supplement  Nutrition Education/Counseling:  No recommendation at this time   Coordination of Nutrition Care:  Continue to monitor while inpatient    Goals:  patient will consume 50% or greater of meals on ADULT DIET; Regular diet order x 3 meals per day and she will consume 50% or greater of Ensure high-protein with meals       Nutrition Monitoring and Evaluation:   Behavioral-Environmental Outcomes:  None Identified   Food/Nutrient Intake Outcomes:  Food and Nutrient Intake, Supplement Intake  Physical Signs/Symptoms Outcomes:  Biochemical Data, Constipation, Hemodynamic Status, Weight     Discharge Planning:     Too soon to determine     Electronically signed by Jacqueline Cuenca RD, LD on 12/17/21 at 1:59 PM EST    Contact: [phone]

## 2021-12-17 NOTE — PROGRESS NOTES
Physical Therapy    Pt was just seen by OT. Per OT she is requesting no further therapy today. Will attempt another day.     Joe Serum, PT, DPT

## 2021-12-18 ENCOUNTER — APPOINTMENT (OUTPATIENT)
Dept: GENERAL RADIOLOGY | Age: 71
DRG: 177 | End: 2021-12-18
Payer: MEDICARE

## 2021-12-18 PROCEDURE — 6370000000 HC RX 637 (ALT 250 FOR IP): Performed by: INTERNAL MEDICINE

## 2021-12-18 PROCEDURE — 6360000002 HC RX W HCPCS: Performed by: INTERNAL MEDICINE

## 2021-12-18 PROCEDURE — 71045 X-RAY EXAM CHEST 1 VIEW: CPT

## 2021-12-18 PROCEDURE — 94761 N-INVAS EAR/PLS OXIMETRY MLT: CPT

## 2021-12-18 PROCEDURE — 94660 CPAP INITIATION&MGMT: CPT

## 2021-12-18 PROCEDURE — 99232 SBSQ HOSP IP/OBS MODERATE 35: CPT | Performed by: INTERNAL MEDICINE

## 2021-12-18 PROCEDURE — 97110 THERAPEUTIC EXERCISES: CPT

## 2021-12-18 PROCEDURE — 97530 THERAPEUTIC ACTIVITIES: CPT

## 2021-12-18 PROCEDURE — 99233 SBSQ HOSP IP/OBS HIGH 50: CPT | Performed by: INTERNAL MEDICINE

## 2021-12-18 PROCEDURE — 2700000000 HC OXYGEN THERAPY PER DAY

## 2021-12-18 PROCEDURE — 2060000000 HC ICU INTERMEDIATE R&B

## 2021-12-18 PROCEDURE — 6370000000 HC RX 637 (ALT 250 FOR IP): Performed by: NURSE PRACTITIONER

## 2021-12-18 PROCEDURE — 2580000003 HC RX 258: Performed by: INTERNAL MEDICINE

## 2021-12-18 RX ORDER — FUROSEMIDE 10 MG/ML
20 INJECTION INTRAMUSCULAR; INTRAVENOUS ONCE
Status: COMPLETED | OUTPATIENT
Start: 2021-12-18 | End: 2021-12-18

## 2021-12-18 RX ADMIN — FAMOTIDINE 20 MG: 20 TABLET ORAL at 20:53

## 2021-12-18 RX ADMIN — LEVOTHYROXINE SODIUM 50 MCG: 0.03 TABLET ORAL at 06:37

## 2021-12-18 RX ADMIN — QUETIAPINE FUMARATE 12.5 MG: 25 TABLET ORAL at 09:18

## 2021-12-18 RX ADMIN — DEXAMETHASONE 1 MG: 1 TABLET ORAL at 09:19

## 2021-12-18 RX ADMIN — ENOXAPARIN SODIUM 30 MG: 100 INJECTION SUBCUTANEOUS at 09:18

## 2021-12-18 RX ADMIN — ENOXAPARIN SODIUM 30 MG: 100 INJECTION SUBCUTANEOUS at 20:53

## 2021-12-18 RX ADMIN — FUROSEMIDE 20 MG: 10 INJECTION, SOLUTION INTRAVENOUS at 13:02

## 2021-12-18 RX ADMIN — SODIUM CHLORIDE, PRESERVATIVE FREE 10 ML: 5 INJECTION INTRAVENOUS at 09:23

## 2021-12-18 RX ADMIN — BENZONATATE 200 MG: 100 CAPSULE ORAL at 04:00

## 2021-12-18 RX ADMIN — ESCITALOPRAM OXALATE 5 MG: 10 TABLET ORAL at 09:19

## 2021-12-18 RX ADMIN — MULTIPLE VITAMINS W/ MINERALS TAB 1 TABLET: TAB at 09:17

## 2021-12-18 RX ADMIN — DEXAMETHASONE 1 MG: 1 TABLET ORAL at 20:52

## 2021-12-18 RX ADMIN — LIOTHYRONINE SODIUM 5 MCG: 5 TABLET ORAL at 09:16

## 2021-12-18 RX ADMIN — QUETIAPINE FUMARATE 12.5 MG: 25 TABLET ORAL at 20:52

## 2021-12-18 RX ADMIN — FAMOTIDINE 20 MG: 20 TABLET ORAL at 09:19

## 2021-12-18 RX ADMIN — SODIUM CHLORIDE, PRESERVATIVE FREE 10 ML: 5 INJECTION INTRAVENOUS at 20:53

## 2021-12-18 NOTE — PROGRESS NOTES
Pulmonary & Critical Care Medicine ICU Progress Note    CC: COVID-19    Events of Last 24 hours:   Vapotherm 25 LPM/65% FiO2    Vascular lines: IV: PICC 12/3    Vitals:  Blood pressure 108/74, pulse 77, temperature 98.6 °F (37 °C), temperature source Oral, resp. rate 18, height 5' 4\" (1.626 m), weight 135 lb 7 oz (61.4 kg), SpO2 97 %, not currently breastfeeding. On Vapotherm    Constitutional:  No acute distress. Eyes: PERRL. Conjunctivae anicteric. ENT: Normal nose. Normal tongue. Neck:  Trachea is midline. No thyroid tenderness. Respiratory: No accessory muscle usage. Decreased breath sounds. No wheezes. + rales. No Rhonchi. Cardiovascular: Normal S1S2. No digit clubbing. No digit cyanosis. No LE edema. Psychiatric: No anxiety or Agitation. Alert and Oriented to person, place and time. Scheduled Meds:   dexamethasone  1 mg Oral 2 times per day    influenza virus vaccine  0.5 mL IntraMUSCular Prior to discharge    escitalopram  5 mg Oral Daily    QUEtiapine  12.5 mg Oral BID    psyllium  1 packet Oral Daily    vitamin D  50,000 Units Oral Weekly    famotidine  20 mg Oral BID    sodium chloride flush  5-40 mL IntraVENous 2 times per day    enoxaparin  30 mg SubCUTAneous BID    levothyroxine  50 mcg Oral Daily    liothyronine  5 mcg Oral Daily    therapeutic multivitamin-minerals  1 tablet Oral Daily       Data:  CBC:   Recent Labs     12/16/21  0515   WBC 6.3   HGB 12.5   HCT 37.5   MCV 94.8        BMP:   Recent Labs     12/16/21  0515      K 4.0      CO2 30   PHOS 4.2   BUN 15   CREATININE <0.5*     LIVER PROFILE:   No results for input(s): AST, ALT, LIPASE, BILIDIR, BILITOT, ALKPHOS in the last 72 hours. Invalid input(s):   AMYLASE,  ALB  Microbiology:  12/1 [facility] NGTD  12/10 Resp NGTD   12/10 BC NGTD   12/10  NGTD     Imaging:  CTPA 12/6 imaging reviewed by me and showed  Diffuse bilateral airspace disease-worsening    ASSESSMENT:  · Acute hypoxemic respiratory failure -severe progressive life-threatening  · COVID-19 viral pneumonia  · ARDS  · Not vaccinated for COVID-19      PLAN:  Vapotherm for life-threatening acute hypoxemic respiratory failure and titrate to maintain SaO2 >90%  Completed Zosyn D#5 and prior Levaquin D#5  Droplet plus isolation  Dexamethasone IV D#17 at 1mg, monitor blood glucose closely  Tocilizumab 12/3/21.  Post 2 doses of Baricitinib   Tessalon TID   PT/OT  DVT prophylaxis: Lovenox BID

## 2021-12-18 NOTE — PROGRESS NOTES
Hospitalist Progress Note      PCP: Isrrael Grimes MD    Date of Admission: 12/1/2021     covid pna, unvaccinated  Worsened hypoxia    now on vapotherm  Worked with PT      Subjective:     Ms [employer] is starting to feel some better. Remains on Vapotherm. FiO2 being titrated down. Currently on O2 25 L-> 15 L, FiO2 65%. Still gets dyspneic with activity. Up to the chair today. She is short of breath while talking in full sentences. .  Desaturating to around 88% with activity.   Back up to 91% with rest.   Refused BiPAP last night    Medications:  Reviewed    Infusion Medications    sodium chloride Stopped (12/03/21 1632)     Scheduled Medications    furosemide  20 mg IntraVENous Once    dexamethasone  1 mg Oral 2 times per day    influenza virus vaccine  0.5 mL IntraMUSCular Prior to discharge    escitalopram  5 mg Oral Daily    QUEtiapine  12.5 mg Oral BID    psyllium  1 packet Oral Daily    vitamin D  50,000 Units Oral Weekly    famotidine  20 mg Oral BID    sodium chloride flush  5-40 mL IntraVENous 2 times per day    enoxaparin  30 mg SubCUTAneous BID    levothyroxine  50 mcg Oral Daily    liothyronine  5 mcg Oral Daily    therapeutic multivitamin-minerals  1 tablet Oral Daily     PRN Meds: benzonatate, melatonin, albuterol sulfate HFA, sodium chloride, oxyCODONE, bismuth subsalicylate, sodium chloride flush, sodium chloride, ondansetron **OR** ondansetron, polyethylene glycol, acetaminophen **OR** acetaminophen, guaiFENesin-dextromethorphan      Intake/Output Summary (Last 24 hours) at 12/18/2021 1041  Last data filed at 12/18/2021 0923  Gross per 24 hour   Intake 380 ml   Output 200 ml   Net 180 ml       Physical Exam Performed:    BP (!) 109/53   Pulse 86   Temp 98 °F (36.7 °C) (Oral)   Resp 20   Ht 5' 4\" (1.626 m)   Wt 135 lb 7 oz (61.4 kg)   SpO2 92%   BMI 23.25 kg/m²   Patient seen in droplet plus precautions    General: thin  appearing elderly female, ill appearing. Sitting up on the chair    Awake, alert and oriented. Mild distress with activity  Mucous Membranes:  Pink , anicteric  Neck: No JVD, no carotid bruit, no thyromegaly  Chest: Diminished breath sounds , mild bibasilar crackles  Cardiovascular:  RRR S1S2 heard, no murmurs or gallops  Abdomen:  Soft, undistended, non tender, no organomegaly, BS present  Extremities: No edema or cyanosis. Distal pulses well felt  Neurological : grossly normal with improving gen weakness      Labs:   Recent Labs     12/16/21  0515   WBC 6.3   HGB 12.5   HCT 37.5        Recent Labs     12/16/21  0515      K 4.0      CO2 30   BUN 15   CREATININE <0.5*   CALCIUM 8.9   PHOS 4.2     No results for input(s): AST, ALT, BILIDIR, BILITOT, ALKPHOS in the last 72 hours. No results for input(s): INR in the last 72 hours. No results for input(s): Velma Poly in the last 72 hours. Urinalysis:      Lab Results   Component Value Date    NITRU Negative 12/10/2021    BLOODU LARGE 12/10/2021    SPECGRAV 1.025 12/10/2021    GLUCOSEU Negative 12/10/2021     Cultures  Sputum - NRF   Blood cultures negative      Radiology:  XR CHEST PORTABLE   Preliminary Result   1. Stable position of right arm PICC. 2. No significant change in appearance of multifocal airspace disease, most   consistent with a combination of multifocal pneumonia and superimposed   pulmonary edema. XR CHEST PORTABLE   Final Result   Mild interval increase in airspace opacities involving the right mid to lower   lung field and left mid to upper lung field. Mild decrease in the airspace   opacities in the left mid to lower lung field. This is when compared to the   prior study performed 12/06/2021. XR CHEST PORTABLE   Final Result   Moderate diffuse airspace disease compatible with multifocal pneumonia with   asymmetric involvement and consolidation left lower lobe. This could   represent COVID pneumonia with superimposed edema. IR PICC WO SQ PORT/PUMP > 5 YEARS   Final Result   Successful placement of PICC line. CT CHEST PULMONARY EMBOLISM W CONTRAST   Final Result   No evidence of pulmonary embolism. Extensive bilateral multifocal airspace disease compatible with multifocal   pneumonia, specifically COVID pneumonia. XR CHEST PORTABLE   Final Result   Multifocal peripherally located pulmonary opacities are seen, likely related   to COVID-19 pneumonia. Assessment/Plan:    COVID PNA  Acute Hypoxic Respiratory Failure/ARDS    - Pt is unvaccinated  - imaging with bilateral infiltrates   - does not wear O2 at baseline  - Admit to Med Surg, droplet + precautions, tele  - progressive hypoxia needing ICU transfer and currently on vapotherm, alternating with bipap  - being treated with Decadron ,  day #17. Dose titrated down to 1 mg now   Status post Tocilizumab 12/3/2021. Post 2 doses of baricitinib PRN Robitussin  - lovenox bid   - slowly improving hypoxia  -We will give Lasix x1 dose today   check CXR    Possible sec bacterial infection  - risk for gram neg infection   - completed levaquin for 5 days, later on zosyn for 5 days - off now   - cx remain neg      Severe anxiety   -Was requiring Precedex drip.  now changed to seroquel bid  Resumed lexapro    Acquired hypothyroidism. Continue home medication.       dvt prophylaxis with lovenox bid  Start ambulation  Out of bed  Remove yoo    Diet: ADULT DIET; Regular  ADULT ORAL NUTRITION SUPPLEMENT; Breakfast, Lunch, Dinner;  Low Calorie/High Protein Oral Supplement  Code Status: DNR-CCA    PT/OT Eval Status: ordered      Spencer Ahumada, MD, 12/18/2021 10:41 AM

## 2021-12-18 NOTE — PROGRESS NOTES
Inpatient Physical Therapy Daily Treatment Note    Unit: PCU  Date:  12/18/2021  Patient Name:    Travis Soares  Admitting diagnosis:  Acute respiratory disease due to COVID-19 virus [U07.1, J06.9]  Acute respiratory failure due to COVID-19 [facility]) [U07.1, J96.00]  Admit Date:  12/1/2021  Precautions/Restrictions:  Fall risk, Lines -Supplemental O2 (Vapotherm 30Lpm, 75% FiO2) and Lee catheter, Telemetry, Continuous pulse oximetry and Isolation Precautions: Droplet Plus - COVID         Discharge Recommendations: ARU/IRF (inpatient rehab facility)   DME needs for discharge: defer to facility       Therapy recommendation for EMS Transport: can transport by wheelchair    Therapy recommendations for staff:   Assist of 1 with use of gait belt and hand held assist for all transfers to/from chair    History of Present Illness: Per Dr. Mulu Mahmood progress note 157: \"80year-old female admitted to hospital with COVID-19. Jeet Perez has a past medical history of depression.  She tested positive for Covid about 10 days ago. Jeet Perez apparently was taking ivermectin at home. Jeet Perez came to the emergency room and had a saturation of 70%.  She was admitted to hospital and then transferred the ICU.  She is presently on Vapotherm.  Pulmonologist discussed with patient and family and apparently she wanted to be a DNI. Marissa Leiva came and told me later on that she was considering intubation.  She is unvaccinated. 12/3-was 100% Vapotherm during rounds.  She was down to 80% Vapotherm in the prone position but had to be made supine for PICC line insertion.  Plan is to reprone her and try to decrease FiO2.  She is on Precedex  12/4-patient has been manually self proning.  FiO2 down to 80%.  On 40 L/min.  Refused BiPAP overnight   12/5- used Bipap last night. On 80% vapo therm. Feeling about the same.    12/6  Currently on Vapotherm 100% 40 L   BiPAP overnight  12/7  Now on Vapotherm 70% FiO2 40 L/min  Used BiPAP overnight  She is  self proning\"    Home Health S4 Level Recommendation: Level 1 Standard  AM-PAC Mobility Score   AM-PAC Inpatient Mobility Raw Score : 17  AM-PAC Inpatient Mobility without Stair Climbing Raw Score : 16      Treatment Time:  15:50-16:17  Treatment number: 3  Timed Code Treatment Minutes: 27 minutes  Total Treatment Minutes:  27  minutes    Cognition    A&O x4   Able to follow 2 step commands    Subjective  Patient lying supine in bed with no family present. Pt agreeable to this PT tx. Pain   No  Location:   Rating:    NA/10  Pain Medicine Status: No request made just shortness of breathe    Bed Mobility   Supine to Sit:    Supervision with HOB elevated  Sit to Supine:   Not Tested  Rolling:   Not Tested  Scooting:   Supervision    Transfer Training     Sit to stand:   CGA  Stand to sit:   CGA  Bed to Chair:   CGA with use of rolling walker (RW)     Pre-Gait Training:       Gait Training not performed today secondary to pt just having issues with her O2 level prior to PT  Distance:        Deviations (firm surface/linoleum):    Assistive Device Used:      Level of Assist:      Comment:     Stair Training deferred, pt unsafe/not appropriate to complete stairs at this time  # of Steps:   N/A  Level of Assist:  Not Tested  UE Support:  NA  Assistive Device:  N/A  Pattern:   N/A  Comments:     Therapeutic Exercise all completed bilaterally unless indicated  Ankle pumps x 10 reps  LAQ x 10 reps  Marches sitting x10 reps    Balance  Sitting:  Good ; Supervision  Comments: ~ 10 min at EOB performing exericses. Good posture. Standing: Fair +; CGA  Comments: ~1 minute initial static stand at EOB for pre-gait activities before transferring to chair. Patient Education      Role of PT, POC, Discharge recommendations, safety awareness, energy conservation, pacing activity and HEP. Positioning Needs       Pt up in chair positioned in proper neutral alignment and pressure relief provided cushion.    Call light provided and all needs within reach    Activity Tolerance   Pt completed therapy session with No adverse symptoms noted w/activity. BP (mmHg) HR (bpm) SpO2 (%) Comments   Supine, at rest  95 96%    Seated at EOB with LE  exercises  98 90-93% c/o of feeling \"weird\", not dizzy   After bed>chair transfer  100 88-90% No c/o   End of treatment  95 94%    75% 30 L/min      Other  N/A    Assessment :  Patient continues to progress well with all therapy goals. Sitting and standing balance is improving. She transfers bed>chair with RW at [facility] with light cues. SpO2 to mid-75%'s with light activity. She participates well and is motivated to recover to PLOF. Recommending ARU/IRF/Inpatient Rehab Facility upon discharge as patient functioning well below baseline, demonstrates good rehab potential and unable to return home due to inability to negotiate stairs to enter home/bedroom/bathroom and home environment not conducive to patient recovery. Pt very motivated to participate and return to her baseline level of function. Pt reports supportive family members. Goals (all goals ongoing unless otherwise indicated)  To be met in 3 visits:  1). Independent with LE Ex x 10 reps - 12/15 progressing     To be met in 6 visits:  1). Supine to/from sit: Supervision  2). Sit to/from stand: Supervision  3). Bed to chair: Supervision  4). Gait: Ambulate 25 ft.   with  SBA and use of LRAD (least restrictive assistive device)  5). Tolerate B LE exercises 3 sets of 10-15 reps  6). Ascend/descend 2 steps with SBA with use of hand rail unilateral and LRAD (least restrictive assistive device)    Plan   3-5x/wk. Signature: Tonio East PT, RWG#[id number]      If patient discharges from this facility prior to next visit, this note will serve as the Discharge Summary.

## 2021-12-18 NOTE — PROGRESS NOTES
Spoke with patients family to update. Patient stable and pleasant. Patient denies any further needs at this time. Call light within reach.  Merline Ramirez RN

## 2021-12-18 NOTE — PROGRESS NOTES
Bedside report and transfer of care given to [facility]. Pt currently resting in bed with the call light within reach. Pt denies any other care needs at this time. Pt stable at this time.

## 2021-12-19 PROCEDURE — 6360000002 HC RX W HCPCS: Performed by: INTERNAL MEDICINE

## 2021-12-19 PROCEDURE — 6370000000 HC RX 637 (ALT 250 FOR IP): Performed by: INTERNAL MEDICINE

## 2021-12-19 PROCEDURE — 94761 N-INVAS EAR/PLS OXIMETRY MLT: CPT

## 2021-12-19 PROCEDURE — 2700000000 HC OXYGEN THERAPY PER DAY

## 2021-12-19 PROCEDURE — 6370000000 HC RX 637 (ALT 250 FOR IP): Performed by: NURSE PRACTITIONER

## 2021-12-19 PROCEDURE — 2580000003 HC RX 258: Performed by: INTERNAL MEDICINE

## 2021-12-19 PROCEDURE — 99233 SBSQ HOSP IP/OBS HIGH 50: CPT | Performed by: INTERNAL MEDICINE

## 2021-12-19 PROCEDURE — 99232 SBSQ HOSP IP/OBS MODERATE 35: CPT | Performed by: INTERNAL MEDICINE

## 2021-12-19 PROCEDURE — 2060000000 HC ICU INTERMEDIATE R&B

## 2021-12-19 RX ADMIN — ESCITALOPRAM OXALATE 5 MG: 10 TABLET ORAL at 10:53

## 2021-12-19 RX ADMIN — FAMOTIDINE 20 MG: 20 TABLET ORAL at 10:55

## 2021-12-19 RX ADMIN — SODIUM CHLORIDE, PRESERVATIVE FREE 10 ML: 5 INJECTION INTRAVENOUS at 20:45

## 2021-12-19 RX ADMIN — DEXAMETHASONE 1 MG: 1 TABLET ORAL at 10:54

## 2021-12-19 RX ADMIN — BENZONATATE 200 MG: 100 CAPSULE ORAL at 20:44

## 2021-12-19 RX ADMIN — QUETIAPINE FUMARATE 12.5 MG: 25 TABLET ORAL at 10:55

## 2021-12-19 RX ADMIN — LEVOTHYROXINE SODIUM 50 MCG: 0.03 TABLET ORAL at 05:00

## 2021-12-19 RX ADMIN — MULTIPLE VITAMINS W/ MINERALS TAB 1 TABLET: TAB at 10:55

## 2021-12-19 RX ADMIN — QUETIAPINE FUMARATE 12.5 MG: 25 TABLET ORAL at 20:44

## 2021-12-19 RX ADMIN — LIOTHYRONINE SODIUM 5 MCG: 5 TABLET ORAL at 10:53

## 2021-12-19 RX ADMIN — FAMOTIDINE 20 MG: 20 TABLET ORAL at 20:44

## 2021-12-19 RX ADMIN — ENOXAPARIN SODIUM 30 MG: 100 INJECTION SUBCUTANEOUS at 20:44

## 2021-12-19 RX ADMIN — ENOXAPARIN SODIUM 30 MG: 100 INJECTION SUBCUTANEOUS at 10:53

## 2021-12-19 RX ADMIN — BENZONATATE 200 MG: 100 CAPSULE ORAL at 05:04

## 2021-12-19 RX ADMIN — Medication 10 MG: at 02:03

## 2021-12-19 RX ADMIN — SODIUM CHLORIDE, PRESERVATIVE FREE 10 ML: 5 INJECTION INTRAVENOUS at 10:59

## 2021-12-19 RX ADMIN — Medication 10 MG: at 20:44

## 2021-12-19 RX ADMIN — DEXAMETHASONE 1 MG: 1 TABLET ORAL at 20:44

## 2021-12-19 NOTE — PROGRESS NOTES
Hospitalist Progress Note      PCP: Evelia Avina MD    Date of Admission: 12/1/2021     covid pna, unvaccinated  Worsened hypoxia    now on vapotherm  Worked with PT      Subjective:     Ms Samantha Urbina is starting to feel some better. Remains on Vapotherm. FiO2 being titrated down. Currently on O2 25 L-> 15 L, FiO2 65%. Still gets dyspneic with activity. Up to the chair today. She is short of breath while talking in full sentences. Unable to take deep breaths. Desaturating to around 88% with activity. Back up to 91% with rest.   Refused BiPAP last night    Chest x-ray shows persistent multifocal airspace disease.       Medications:  Reviewed    Infusion Medications    sodium chloride Stopped (12/03/21 1632)     Scheduled Medications    dexamethasone  1 mg Oral 2 times per day    influenza virus vaccine  0.5 mL IntraMUSCular Prior to discharge    escitalopram  5 mg Oral Daily    QUEtiapine  12.5 mg Oral BID    psyllium  1 packet Oral Daily    vitamin D  50,000 Units Oral Weekly    famotidine  20 mg Oral BID    sodium chloride flush  5-40 mL IntraVENous 2 times per day    enoxaparin  30 mg SubCUTAneous BID    levothyroxine  50 mcg Oral Daily    liothyronine  5 mcg Oral Daily    therapeutic multivitamin-minerals  1 tablet Oral Daily     PRN Meds: benzonatate, melatonin, albuterol sulfate HFA, sodium chloride, oxyCODONE, bismuth subsalicylate, sodium chloride flush, sodium chloride, ondansetron **OR** ondansetron, polyethylene glycol, acetaminophen **OR** acetaminophen, guaiFENesin-dextromethorphan      Intake/Output Summary (Last 24 hours) at 12/19/2021 1716  Last data filed at 12/19/2021 1305  Gross per 24 hour   Intake 730 ml   Output 1300 ml   Net -570 ml       Physical Exam Performed:    /65   Pulse 85   Temp 98.2 °F (36.8 °C) (Oral)   Resp 20   Ht 5' 4\" (1.626 m)   Wt 134 lb 9 oz (61 kg)   SpO2 96%   BMI 23.10 kg/m²   Patient seen in droplet plus precautions    General: thin  appearing elderly female, ill appearing. Sitting up on the chair    Awake, alert and oriented. Mild distress with activity  Mucous Membranes:  Pink , anicteric  Neck: No JVD, no carotid bruit, no thyromegaly  Chest: Diminished breath sounds, no wheezes rales or rhonchi. Cardiovascular:  RRR S1S2 heard, no murmurs or gallops  Abdomen:  Soft, undistended, non tender, no organomegaly, BS present  Extremities: No edema or cyanosis. Distal pulses well felt  Neurological : grossly normal with improving gen weakness      Labs:   No results for input(s): WBC, HGB, HCT, PLT in the last 72 hours. No results for input(s): NA, K, CL, CO2, BUN, CREATININE, CALCIUM, PHOS in the last 72 hours. Invalid input(s): MAGNES  No results for input(s): AST, ALT, BILIDIR, BILITOT, ALKPHOS in the last 72 hours. No results for input(s): INR in the last 72 hours. No results for input(s): Erle Keepers in the last 72 hours. Urinalysis:      Lab Results   Component Value Date    NITRU Negative 12/10/2021    BLOODU LARGE 12/10/2021    SPECGRAV 1.025 12/10/2021    GLUCOSEU Negative 12/10/2021     Cultures  Sputum - NRF   Blood cultures negative      Radiology:  XR CHEST PORTABLE   Preliminary Result   1. Stable appropriate position of right arm PICC. 2. No significant change in appearance of multifocal airspace disease, most   consistent with a combination of multifocal pneumonia and superimposed   pulmonary edema. XR CHEST PORTABLE   Final Result   Mild interval increase in airspace opacities involving the right mid to lower   lung field and left mid to upper lung field. Mild decrease in the airspace   opacities in the left mid to lower lung field. This is when compared to the   prior study performed 12/06/2021. XR CHEST PORTABLE   Final Result   Moderate diffuse airspace disease compatible with multifocal pneumonia with   asymmetric involvement and consolidation left lower lobe.   This could   represent COVID pneumonia with superimposed edema. IR PICC WO SQ PORT/PUMP > 5 YEARS   Final Result   Successful placement of PICC line. CT CHEST PULMONARY EMBOLISM W CONTRAST   Final Result   No evidence of pulmonary embolism. Extensive bilateral multifocal airspace disease compatible with multifocal   pneumonia, specifically COVID pneumonia. XR CHEST PORTABLE   Final Result   Multifocal peripherally located pulmonary opacities are seen, likely related   to COVID-19 pneumonia. Assessment/Plan:    COVID PNA  Acute Hypoxic Respiratory Failure/ARDS    - Pt is unvaccinated  - imaging with bilateral infiltrates   - does not wear O2 at baseline  - Admit to Med Surg, droplet + precautions, tele  - progressive hypoxia needing ICU transfer and currently on vapotherm, alternating with bipap  - being treated with Decadron ,  day #18. Dose titrated down to 1 mg now   Status post Tocilizumab 12/3/2021. Post 2 doses of baricitinib PRN Robitussin  - lovenox bid   - slowly improving hypoxia. Try incentive spirometry     Possible sec bacterial infection  - risk for gram neg infection   - completed levaquin for 5 days, later on zosyn for 5 days - off now   - cx remain neg      Severe anxiety   -Was requiring Precedex drip.  now changed to seroquel bid  Resumed lexapro    Acquired hypothyroidism. Continue home medication.     Weakness and debility  -Continue PT OT      dvt prophylaxis with lovenox bid  Start ambulation    Diet: ADULT DIET; Regular  ADULT ORAL NUTRITION SUPPLEMENT; Breakfast, Lunch, Dinner;  Low Calorie/High Protein Oral Supplement  Code Status: DNR-CCA        Robbin Palencia MD, 12/19/2021 5:16 PM

## 2021-12-19 NOTE — PROGRESS NOTES
Patient assessed and AM medication given. Patient reports \"feeling much better. \"  Patient up in chair. Would like to remain in chair as much as possible. Patient denies any further needs at this time. Call light within reach.  Cr Barksdale RN

## 2021-12-19 NOTE — PROGRESS NOTES
Patient has tolerated ambulation from bed to chair well today. Patient has tolerated decrease O2 to 15L well. Patient denies any further needs at this time. Call light within reach.  Taiwo Sheppard RN

## 2021-12-19 NOTE — PROGRESS NOTES
Pulmonary & Critical Care Medicine ICU Progress Note    CC: COVID-19    Events of Last 24 hours:   Vapotherm ; seen up in chair    Vascular lines: IV: PICC 12/3    Vitals:  Blood pressure 115/65, pulse 85, temperature 98.2 °F (36.8 °C), temperature source Oral, resp. rate 20, height 5' 4\" (1.626 m), weight 134 lb 9 oz (61 kg), SpO2 96 %, not currently breastfeeding. On Vapotherm    Constitutional:  No acute distress. Eyes: PERRL. Conjunctivae anicteric. ENT: Normal nose. Normal tongue. Neck:  Trachea is midline. No thyroid tenderness. Respiratory: No accessory muscle usage. Decreased breath sounds. No wheezes. + rales. No Rhonchi. Cardiovascular: Normal S1S2. No digit clubbing. No digit cyanosis. No LE edema. Psychiatric: No anxiety or Agitation. Alert and Oriented to person, place and time. Scheduled Meds:   dexamethasone  1 mg Oral 2 times per day    influenza virus vaccine  0.5 mL IntraMUSCular Prior to discharge    escitalopram  5 mg Oral Daily    QUEtiapine  12.5 mg Oral BID    psyllium  1 packet Oral Daily    vitamin D  50,000 Units Oral Weekly    famotidine  20 mg Oral BID    sodium chloride flush  5-40 mL IntraVENous 2 times per day    enoxaparin  30 mg SubCUTAneous BID    levothyroxine  50 mcg Oral Daily    liothyronine  5 mcg Oral Daily    therapeutic multivitamin-minerals  1 tablet Oral Daily       Data:  CBC:   No results for input(s): WBC, HGB, HCT, MCV, PLT in the last 72 hours. BMP:   No results for input(s): NA, K, CL, CO2, PHOS, BUN, CREATININE in the last 72 hours. Invalid input(s): CA  LIVER PROFILE:   No results for input(s): AST, ALT, LIPASE, BILIDIR, BILITOT, ALKPHOS in the last 72 hours. Invalid input(s):   AMYLASE,  ALB  Microbiology:  12/1 [facility] NGTD  12/10 Resp NGTD   12/10 BC NGTD   12/10  NGTD     Imaging:  CTPA 12/6 imaging reviewed by me and showed  Diffuse bilateral airspace disease-worsening    ASSESSMENT:  · Acute hypoxemic respiratory failure -severe progressive life-threatening  · COVID-19 viral pneumonia  · ARDS  · Not vaccinated for COVID-19      PLAN:  Should be able to transition to nasal cannula today  Completed Zosyn D#5 and prior Levaquin D#5  Droplet plus isolation  Dexamethasone IV D#18 at 1mg, monitor blood glucose closely  Tocilizumab 12/3/21.  Post 2 doses of Baricitinib   Tessalon TID   PT/OT  DVT prophylaxis: Lovenox BID

## 2021-12-19 NOTE — PROGRESS NOTES
Pt awake in bed. Assessment completed and medications given. VS stable. Pt denies any further needs at this time. Call light in reach and bed in lowest position will continue to monitor.

## 2021-12-19 NOTE — PROGRESS NOTES
End of Shift. Patient stable. Report given to [facility]. Transferred care.   Antoine Haro RN

## 2021-12-20 LAB
ANION GAP SERPL CALCULATED.3IONS-SCNC: 7 MMOL/L (ref 3–16)
BUN BLDV-MCNC: 12 MG/DL (ref 7–20)
CALCIUM SERPL-MCNC: 8.8 MG/DL (ref 8.3–10.6)
CHLORIDE BLD-SCNC: 99 MMOL/L (ref 99–110)
CO2: 31 MMOL/L (ref 21–32)
CREAT SERPL-MCNC: <0.5 MG/DL (ref 0.6–1.2)
GFR AFRICAN AMERICAN: >60
GFR NON-AFRICAN AMERICAN: >60
GLUCOSE BLD-MCNC: 102 MG/DL (ref 70–99)
HCT VFR BLD CALC: 35.9 % (ref 36–48)
HEMOGLOBIN: 12.2 G/DL (ref 12–16)
MCH RBC QN AUTO: 31.6 PG (ref 26–34)
MCHC RBC AUTO-ENTMCNC: 33.9 G/DL (ref 31–36)
MCV RBC AUTO: 93.2 FL (ref 80–100)
PDW BLD-RTO: 14.8 % (ref 12.4–15.4)
PLATELET # BLD: 364 K/UL (ref 135–450)
PMV BLD AUTO: 7.7 FL (ref 5–10.5)
POTASSIUM REFLEX MAGNESIUM: 4.1 MMOL/L (ref 3.5–5.1)
RBC # BLD: 3.85 M/UL (ref 4–5.2)
SODIUM BLD-SCNC: 137 MMOL/L (ref 136–145)
WBC # BLD: 8.2 K/UL (ref 4–11)

## 2021-12-20 PROCEDURE — 6370000000 HC RX 637 (ALT 250 FOR IP): Performed by: INTERNAL MEDICINE

## 2021-12-20 PROCEDURE — 99232 SBSQ HOSP IP/OBS MODERATE 35: CPT | Performed by: INTERNAL MEDICINE

## 2021-12-20 PROCEDURE — 2580000003 HC RX 258: Performed by: INTERNAL MEDICINE

## 2021-12-20 PROCEDURE — 99233 SBSQ HOSP IP/OBS HIGH 50: CPT | Performed by: INTERNAL MEDICINE

## 2021-12-20 PROCEDURE — 6360000002 HC RX W HCPCS: Performed by: INTERNAL MEDICINE

## 2021-12-20 PROCEDURE — 94640 AIRWAY INHALATION TREATMENT: CPT

## 2021-12-20 PROCEDURE — 94761 N-INVAS EAR/PLS OXIMETRY MLT: CPT

## 2021-12-20 PROCEDURE — 2060000000 HC ICU INTERMEDIATE R&B

## 2021-12-20 PROCEDURE — 6370000000 HC RX 637 (ALT 250 FOR IP)

## 2021-12-20 PROCEDURE — 2700000000 HC OXYGEN THERAPY PER DAY

## 2021-12-20 PROCEDURE — 6370000000 HC RX 637 (ALT 250 FOR IP): Performed by: NURSE PRACTITIONER

## 2021-12-20 PROCEDURE — 80048 BASIC METABOLIC PNL TOTAL CA: CPT

## 2021-12-20 PROCEDURE — 85027 COMPLETE CBC AUTOMATED: CPT

## 2021-12-20 RX ORDER — ALBUTEROL SULFATE 2.5 MG/3ML
2.5 SOLUTION RESPIRATORY (INHALATION)
Status: DISCONTINUED | OUTPATIENT
Start: 2021-12-20 | End: 2021-12-20

## 2021-12-20 RX ORDER — DIMETHICONE, OXYBENZONE, AND PADIMATE O 2; 2.5; 6.6 G/100G; G/100G; G/100G
STICK TOPICAL
Status: COMPLETED
Start: 2021-12-20 | End: 2021-12-20

## 2021-12-20 RX ORDER — ALBUTEROL SULFATE 90 UG/1
2 AEROSOL, METERED RESPIRATORY (INHALATION) EVERY 4 HOURS PRN
Status: DISCONTINUED | OUTPATIENT
Start: 2021-12-20 | End: 2021-12-23 | Stop reason: HOSPADM

## 2021-12-20 RX ORDER — ALBUTEROL SULFATE 90 UG/1
1 AEROSOL, METERED RESPIRATORY (INHALATION) EVERY 6 HOURS PRN
Status: DISCONTINUED | OUTPATIENT
Start: 2021-12-20 | End: 2021-12-20

## 2021-12-20 RX ADMIN — ENOXAPARIN SODIUM 30 MG: 100 INJECTION SUBCUTANEOUS at 22:15

## 2021-12-20 RX ADMIN — LEVOTHYROXINE SODIUM 50 MCG: 0.03 TABLET ORAL at 05:04

## 2021-12-20 RX ADMIN — BENZONATATE 200 MG: 100 CAPSULE ORAL at 22:16

## 2021-12-20 RX ADMIN — DIMETHICONE, OXYBENZONE, AND PADIMATE O: 2; 2.5; 6.6 STICK TOPICAL at 02:40

## 2021-12-20 RX ADMIN — SODIUM CHLORIDE, PRESERVATIVE FREE 10 ML: 5 INJECTION INTRAVENOUS at 22:16

## 2021-12-20 RX ADMIN — FAMOTIDINE 20 MG: 20 TABLET ORAL at 22:16

## 2021-12-20 RX ADMIN — PSYLLIUM HUSK 1 PACKET: 3.4 POWDER ORAL at 09:09

## 2021-12-20 RX ADMIN — QUETIAPINE FUMARATE 12.5 MG: 25 TABLET ORAL at 22:15

## 2021-12-20 RX ADMIN — LIOTHYRONINE SODIUM 5 MCG: 5 TABLET ORAL at 09:42

## 2021-12-20 RX ADMIN — MULTIPLE VITAMINS W/ MINERALS TAB 1 TABLET: TAB at 09:08

## 2021-12-20 RX ADMIN — Medication 10 MG: at 22:15

## 2021-12-20 RX ADMIN — Medication 2 PUFF: at 16:24

## 2021-12-20 RX ADMIN — FAMOTIDINE 20 MG: 20 TABLET ORAL at 09:09

## 2021-12-20 RX ADMIN — BENZONATATE 200 MG: 100 CAPSULE ORAL at 09:42

## 2021-12-20 RX ADMIN — DEXAMETHASONE 1 MG: 1 TABLET ORAL at 09:09

## 2021-12-20 RX ADMIN — DEXAMETHASONE 1 MG: 1 TABLET ORAL at 22:16

## 2021-12-20 RX ADMIN — GUAIFENESIN AND DEXTROMETHORPHAN 5 ML: 100; 10 SYRUP ORAL at 01:33

## 2021-12-20 RX ADMIN — ENOXAPARIN SODIUM 30 MG: 100 INJECTION SUBCUTANEOUS at 09:10

## 2021-12-20 RX ADMIN — QUETIAPINE FUMARATE 12.5 MG: 25 TABLET ORAL at 09:08

## 2021-12-20 RX ADMIN — ESCITALOPRAM OXALATE 5 MG: 10 TABLET ORAL at 09:08

## 2021-12-20 RX ADMIN — SODIUM CHLORIDE, PRESERVATIVE FREE 10 ML: 5 INJECTION INTRAVENOUS at 09:10

## 2021-12-20 NOTE — PROGRESS NOTES
Handoff report given to CASSIDY Crews. Care transferred.      Electronically signed by Yany Nation RN on 12/20/2021 at 7:49 AM

## 2021-12-20 NOTE — PROGRESS NOTES
Reza Whitt, [id number] spoke with writer about patient being able to keep inhaler at bedside. RN spoke with Vonda RT and Brendan Lin RT. Plans to keep inhaler at bedside. RT will monitor how much patient is taking and RN will follow up with patient as well. PA states that patient seems reliable enough to keep inhaler at bedside, if that changes, we can return to patient having to call out.

## 2021-12-20 NOTE — PROGRESS NOTES
Hospitalist Progress Note      PCP: Michelle Ravi MD    Date of Admission: 12/1/2021     covid pna, unvaccinated  Placed on Vapotherm for worsening hypoxemia        Subjective:     Ms Phyllis Germain is starting to feel some better. She has been on Vapotherm for several days, FiO2 slowly weaned down. Switch to nasal cannula today. Currently O2 sats stable on 8 L nasal cannula. Patient still weak, working with therapy does desaturate with minimal activity. .    Still gets dyspneic with activity. Sitting up in the chair. History of significant shortness of breath while talking in full sentences, this is better today. Doing incentive spirometry. Not as dyspneic while talking today        Chest x-ray shows persistent multifocal airspace disease.       Medications:  Reviewed    Infusion Medications    sodium chloride Stopped (12/03/21 1632)     Scheduled Medications    dexamethasone  1 mg Oral 2 times per day    influenza virus vaccine  0.5 mL IntraMUSCular Prior to discharge    escitalopram  5 mg Oral Daily    QUEtiapine  12.5 mg Oral BID    psyllium  1 packet Oral Daily    vitamin D  50,000 Units Oral Weekly    famotidine  20 mg Oral BID    sodium chloride flush  5-40 mL IntraVENous 2 times per day    enoxaparin  30 mg SubCUTAneous BID    levothyroxine  50 mcg Oral Daily    liothyronine  5 mcg Oral Daily    therapeutic multivitamin-minerals  1 tablet Oral Daily     PRN Meds: albuterol sulfate HFA, benzonatate, melatonin, albuterol sulfate HFA, sodium chloride, oxyCODONE, bismuth subsalicylate, sodium chloride flush, sodium chloride, ondansetron **OR** ondansetron, polyethylene glycol, acetaminophen **OR** acetaminophen, guaiFENesin-dextromethorphan      Intake/Output Summary (Last 24 hours) at 12/20/2021 1458  Last data filed at 12/20/2021 1350  Gross per 24 hour   Intake 520 ml   Output 450 ml   Net 70 ml       Physical Exam Performed:    BP (!) 108/57   Pulse 83   Temp 98 °F (36.7 °C) (Oral) Resp 18   Ht 5' 4\" (1.626 m)   Wt 134 lb 14.4 oz (61.2 kg)   SpO2 95%   BMI 23.16 kg/m²   Patient seen in droplet plus precautions    General: thin  appearing elderly female  Sitting up on the chair    Awake, alert and oriented. No distress  Mucous Membranes:  Pink , anicteric  Neck: No JVD, no carotid bruit, no thyromegaly  Chest: Diminished breath sounds, improving air entry. no wheezes rales or rhonchi. Cardiovascular:  RRR S1S2 heard, no murmurs or gallops  Abdomen:  Soft, undistended, non tender, no organomegaly, BS present  Extremities: No edema or cyanosis. Distal pulses well felt  Neurological : grossly normal with improving gen weakness      Labs:   Recent Labs     12/20/21  0520   WBC 8.2   HGB 12.2   HCT 35.9*        Recent Labs     12/20/21  0520      K 4.1   CL 99   CO2 31   BUN 12   CREATININE <0.5*   CALCIUM 8.8     No results for input(s): AST, ALT, BILIDIR, BILITOT, ALKPHOS in the last 72 hours. No results for input(s): INR in the last 72 hours. No results for input(s): Sonjia Jaky in the last 72 hours. Urinalysis:      Lab Results   Component Value Date    NITRU Negative 12/10/2021    BLOODU LARGE 12/10/2021    SPECGRAV 1.025 12/10/2021    GLUCOSEU Negative 12/10/2021     Cultures  Sputum - NRF   Blood cultures negative      Radiology:  XR CHEST PORTABLE   Preliminary Result   1. Stable appropriate position of right arm PICC. 2. No significant change in appearance of multifocal airspace disease, most   consistent with a combination of multifocal pneumonia and superimposed   pulmonary edema. XR CHEST PORTABLE   Final Result   Mild interval increase in airspace opacities involving the right mid to lower   lung field and left mid to upper lung field. Mild decrease in the airspace   opacities in the left mid to lower lung field. This is when compared to the   prior study performed 12/06/2021.          XR CHEST PORTABLE   Final Result   Moderate diffuse airspace disease compatible with multifocal pneumonia with   asymmetric involvement and consolidation left lower lobe. This could   represent COVID pneumonia with superimposed edema. IR PICC WO SQ PORT/PUMP > 5 YEARS   Final Result   Successful placement of PICC line. CT CHEST PULMONARY EMBOLISM W CONTRAST   Final Result   No evidence of pulmonary embolism. Extensive bilateral multifocal airspace disease compatible with multifocal   pneumonia, specifically COVID pneumonia. XR CHEST PORTABLE   Final Result   Multifocal peripherally located pulmonary opacities are seen, likely related   to COVID-19 pneumonia. Assessment/Plan:    COVID PNA  Acute Hypoxic Respiratory Failure/ARDS    - Pt is unvaccinated  - imaging with bilateral infiltrates   - does not wear O2 at baseline  - Admit to Med Surg, droplet + precautions, tele  - progressive hypoxia needing ICU transfer and currently on vapotherm, alternating with bipap  -Hypoxemia improved now, weaned off of Vapotherm, currently on O2 8 L per nasal cannula. Continue to wean O2 as tolerated  - being treated with Decadron ,  day #19. Dose titrated down to 1 mg now  - Status post Tocilizumab 12/3/2021. Status post 2 doses of baricitinib  - PRN Robitussin  - lovenox bid   -  incentive spirometry   - albuterol MDI prn    Possible sec bacterial infection  - risk for gram neg infection   - completed levaquin for 5 days, later on zosyn for 5 days - off now   - cx remain neg      Severe anxiety   -Was requiring Precedex drip.  now changed to seroquel bid  Resumed lexapro    Acquired hypothyroidism. Continue home medication.     Weakness and debility  -Continue PT OT      dvt prophylaxis with lovenox bid  Start ambulation    Diet: ADULT DIET; Regular  ADULT ORAL NUTRITION SUPPLEMENT; Breakfast, Lunch, Dinner; Low Calorie/High Protein Oral Supplement  Code Status: DNR-CCA      Patient slowly improving.   Hopefully can go home with home health care over the next 3 to 4 days if O2 sats continue to improve    Eric Pimentel MD, 12/20/2021 2:58 PM

## 2021-12-20 NOTE — PROGRESS NOTES
Pulmonary & Critical Care Medicine Progress Note  Hospital Day: 20   CC: COVID-19    Subjective: Weaned from Vapotherm down to 15 L, now 8 L  Feeling about the same, hopeful. \"Spasms\" that catch her breath, occur ~q2 hrs & last ~20 mins. Has been relieved by albuterol before, wanting to try that again. Vascular lines: IV: PICC 12/3    Vitals:  Blood pressure 114/62, pulse 91, temperature 97.9 °F (36.6 °C), temperature source Oral, resp. rate 20, height 5' 4\" (1.626 m), weight 134 lb 14.4 oz (61.2 kg), SpO2 90 %, not currently breastfeeding. 8 L    General: ill appearing. Eyes: PERRL. No sclera icterus. No conjunctival injection. ENT: No discharge. Pharynx clear. Neck: Trachea midline. Normal thyroid. Resp: No accessory muscle use. ++ crackles. No wheezing. No rhonchi. No dullness on percussion. CV: Regular rate. Regular rhythm. No mumur or rub. Trace LE edema. Peripheral pulses are 2+. Capillary refill is less than 3 seconds. GI: Non-tender. Non-distended. No masses. No organomegaly. Normal bowel sounds. No hernia. Skin: Warm and dry. No nodule on exposed extremities. No rash on exposed extremities. Lymph: No cervical LAD. No supraclavicular LAD. M/S: No cyanosis. No joint deformity. No clubbing. Neuro: A&OX3. Patellar reflexes are symmetric. Psych: No agitation, no anxiety, affect is full.      Scheduled Meds:   dexamethasone  1 mg Oral 2 times per day    influenza virus vaccine  0.5 mL IntraMUSCular Prior to discharge    escitalopram  5 mg Oral Daily    QUEtiapine  12.5 mg Oral BID    psyllium  1 packet Oral Daily    vitamin D  50,000 Units Oral Weekly    famotidine  20 mg Oral BID    sodium chloride flush  5-40 mL IntraVENous 2 times per day    enoxaparin  30 mg SubCUTAneous BID    levothyroxine  50 mcg Oral Daily    liothyronine  5 mcg Oral Daily    therapeutic multivitamin-minerals  1 tablet Oral Daily     Data:  CBC:   Recent Labs     12/20/21  0520   WBC 8.2   HGB 12.2   HCT 35.9*   MCV 93.2        BMP:   Recent Labs     12/20/21  0520      K 4.1   CL 99   CO2 31   BUN 12   CREATININE <0.5*     LIVER PROFILE:   No results for input(s): AST, ALT, LIPASE, BILIDIR, BILITOT, ALKPHOS in the last 72 hours. Invalid input(s): AMYLASE,  ALB    Microbiology:  12/1/2021 BC NG  12/10/2021 Resp NG  12/10/2021 BC NG   12/10/2021 UC NG    Imaging:  CTPA 12/1/2021  1. No evidence of pulmonary embolism. 2. Extensive bilateral multifocal airspace disease compatible with multifocal   pneumonia, specifically COVID pneumonia. CXR 12/18/2021   1. Stable appropriate position of right arm PICC. 2. No significant change in appearance of multifocal airspace disease, most   consistent with a combination of multifocal pneumonia and superimposed   pulmonary edema.      ASSESSMENT:  · Acute hypoxemic respiratory failure - severe & life-threatening  · COVID-19 viral pneumonia in an unvaccinated patient  · ARDS      PLAN:  COVID-19 isolation, droplet plus  High flow supplemental oxygen to maintain SaO2 >92%; wean as tolerated  Albuterol for bronchospasms  Decadron D#20, 1 mg daily    Completed 5 days Zosyn & prior 5 days Levaquin  S/p Tocilizumab x1 12/3/21; s/p 2 doses of Baricitinib  Inhaled bronchodilators only as needed, MDI preferred  PT/OT  DVT prophylaxis: Lovenox 30 BID  CODE: DNR-CCA

## 2021-12-20 NOTE — PROGRESS NOTES
Bedside report and transfer of care given to [facility], RN. Pt currently resting in bed with the call light within reach. Pt denies any other care needs at this time. Pt stable at this time.

## 2021-12-21 ENCOUNTER — APPOINTMENT (OUTPATIENT)
Dept: CT IMAGING | Age: 71
DRG: 177 | End: 2021-12-21
Payer: MEDICARE

## 2021-12-21 PROCEDURE — 6370000000 HC RX 637 (ALT 250 FOR IP): Performed by: INTERNAL MEDICINE

## 2021-12-21 PROCEDURE — 6360000002 HC RX W HCPCS: Performed by: INTERNAL MEDICINE

## 2021-12-21 PROCEDURE — 2700000000 HC OXYGEN THERAPY PER DAY

## 2021-12-21 PROCEDURE — 99232 SBSQ HOSP IP/OBS MODERATE 35: CPT | Performed by: INTERNAL MEDICINE

## 2021-12-21 PROCEDURE — 97535 SELF CARE MNGMENT TRAINING: CPT

## 2021-12-21 PROCEDURE — 2580000003 HC RX 258: Performed by: INTERNAL MEDICINE

## 2021-12-21 PROCEDURE — 6360000004 HC RX CONTRAST MEDICATION: Performed by: INTERNAL MEDICINE

## 2021-12-21 PROCEDURE — 6370000000 HC RX 637 (ALT 250 FOR IP): Performed by: NURSE PRACTITIONER

## 2021-12-21 PROCEDURE — 71275 CT ANGIOGRAPHY CHEST: CPT

## 2021-12-21 PROCEDURE — 97530 THERAPEUTIC ACTIVITIES: CPT

## 2021-12-21 PROCEDURE — 94761 N-INVAS EAR/PLS OXIMETRY MLT: CPT

## 2021-12-21 PROCEDURE — 99233 SBSQ HOSP IP/OBS HIGH 50: CPT | Performed by: INTERNAL MEDICINE

## 2021-12-21 PROCEDURE — 2060000000 HC ICU INTERMEDIATE R&B

## 2021-12-21 PROCEDURE — 97110 THERAPEUTIC EXERCISES: CPT

## 2021-12-21 RX ORDER — DEXAMETHASONE 1 MG
1 TABLET ORAL DAILY
Status: DISCONTINUED | OUTPATIENT
Start: 2021-12-22 | End: 2021-12-23 | Stop reason: HOSPADM

## 2021-12-21 RX ORDER — KETOROLAC TROMETHAMINE 30 MG/ML
15 INJECTION, SOLUTION INTRAMUSCULAR; INTRAVENOUS EVERY 6 HOURS PRN
Status: DISCONTINUED | OUTPATIENT
Start: 2021-12-21 | End: 2021-12-23 | Stop reason: HOSPADM

## 2021-12-21 RX ADMIN — IOPAMIDOL 85 ML: 755 INJECTION, SOLUTION INTRAVENOUS at 11:57

## 2021-12-21 RX ADMIN — Medication 10 MG: at 20:30

## 2021-12-21 RX ADMIN — DEXAMETHASONE 1 MG: 1 TABLET ORAL at 08:14

## 2021-12-21 RX ADMIN — LEVOTHYROXINE SODIUM 50 MCG: 0.03 TABLET ORAL at 06:32

## 2021-12-21 RX ADMIN — ESCITALOPRAM OXALATE 5 MG: 10 TABLET ORAL at 08:13

## 2021-12-21 RX ADMIN — MULTIPLE VITAMINS W/ MINERALS TAB 1 TABLET: TAB at 08:14

## 2021-12-21 RX ADMIN — FAMOTIDINE 20 MG: 20 TABLET ORAL at 20:29

## 2021-12-21 RX ADMIN — QUETIAPINE FUMARATE 12.5 MG: 25 TABLET ORAL at 20:29

## 2021-12-21 RX ADMIN — LIOTHYRONINE SODIUM 5 MCG: 5 TABLET ORAL at 08:14

## 2021-12-21 RX ADMIN — BENZONATATE 200 MG: 100 CAPSULE ORAL at 08:13

## 2021-12-21 RX ADMIN — FAMOTIDINE 20 MG: 20 TABLET ORAL at 08:14

## 2021-12-21 RX ADMIN — SODIUM CHLORIDE, PRESERVATIVE FREE 10 ML: 5 INJECTION INTRAVENOUS at 20:30

## 2021-12-21 RX ADMIN — ACETAMINOPHEN 650 MG: 325 TABLET ORAL at 03:39

## 2021-12-21 RX ADMIN — PSYLLIUM HUSK 1 PACKET: 3.4 POWDER ORAL at 08:14

## 2021-12-21 RX ADMIN — QUETIAPINE FUMARATE 12.5 MG: 25 TABLET ORAL at 08:13

## 2021-12-21 RX ADMIN — ENOXAPARIN SODIUM 30 MG: 100 INJECTION SUBCUTANEOUS at 20:30

## 2021-12-21 RX ADMIN — ENOXAPARIN SODIUM 30 MG: 100 INJECTION SUBCUTANEOUS at 08:14

## 2021-12-21 RX ADMIN — BENZONATATE 200 MG: 100 CAPSULE ORAL at 20:29

## 2021-12-21 RX ADMIN — SODIUM CHLORIDE, PRESERVATIVE FREE 10 ML: 5 INJECTION INTRAVENOUS at 08:15

## 2021-12-21 ASSESSMENT — PAIN DESCRIPTION - ORIENTATION: ORIENTATION: RIGHT

## 2021-12-21 ASSESSMENT — PAIN SCALES - GENERAL
PAINLEVEL_OUTOF10: 3
PAINLEVEL_OUTOF10: 3

## 2021-12-21 ASSESSMENT — PAIN DESCRIPTION - LOCATION: LOCATION: CHEST

## 2021-12-21 ASSESSMENT — PAIN DESCRIPTION - PAIN TYPE: TYPE: ACUTE PAIN

## 2021-12-21 NOTE — PROGRESS NOTES
Occupational Therapy Daily Treatment Note    Unit: PCU   Date:  12/21/2021  Patient Name:    Ana Laura Jones  Admitting diagnosis:  Acute respiratory disease due to COVID-19 virus [U07.1, J06.9]  Acute respiratory failure due to COVID-19 [facility]) [U07.1, J96.00]  Admit Date:  12/1/2021  Precautions/Restrictions:  Fall risk, Lines -Supplemental O2 4 liters high flow  Telemetry, Continuous pulse oximetry and Isolation Precautions: Droplet Plus - COVID    Treatment Time:  810-850   Treatment number:  6  Total Treatment Time:   40  minutes    Patient Goals for Session:  \" I want to be able to relax and not be as tense\"      Discharge Recommendations: ARU pt declining ARU/SNF   DME needs for discharge: continue to assess    Patient plans to d/c to dtr's house with 24 hour assist and HHOT/PT. Therapy recommendations for staff:  Assist of 1 with use of gait belt and RW for all transfers to/from BSC/chair    History of Present Illness: [place] unvaccinated 80 y.o. female  With Vital signs of /65   Pulse 94   Temp 99.4 °F (37.4 °C) (Oral)   Resp 22   Ht 5' 4\" (1.626 m)   Wt 140 lb (63.5 kg)   SpO2 94%   BMI 24.03 kg/m²  who presents to the ED with a complaint of shortness of breath.  Patient seen and evaluated in room 2.  Patient was diagnosed with Covid 2 weeks ago with a positive Covid test.  Since then she has been doing fine until the this evening when she started to get more short of breath.  She says she tested her pulse oximetry level and it was found to be in the seventies.  But got progressively better.  Upon arrival here in the emergency department her pulse oximetry reading is 94% on a nonrebreather mask.  She denies any chest pain no leg swelling.  Patient was treated with 1 DuoNeb on the way into the emergency department by EMS.  No other complaints, modifying factors or associated symptoms  12/3-was 100% Vapotherm during rounds.  She was down to 80% Vapotherm in the prone position but had to be made supine for PICC line insertion.  Plan is to reprone her and try to decrease FiO2.  She is on Precedex  -patient has been manually self proning.  FiO2 down to 80%.  On 40 L/min.  Refused BiPAP overnight   - used Bipap last night. On 80% vapo therm. Feeling about the same.   Currently on Vapotherm 100% 40 L   BiPAP overnight    Now on Vapotherm 70% FiO2 40 L/min  Used BiPAP overnight  She is  self proning\"    Home Health S4 Level Recommendation:  NA    AM-PAC Score: AM-PAC Inpatient Daily Activity Raw Score: 19  Pt scored a 14/24 on the AM-PAC ADL Inpatient form. Current research shows that an AM-PAC score of 17 or less is typically not associated with a discharge to the patient's home setting. Subjective:  Pt in bed, agreeable to therapy. Pain   Yes  Ratin/10  Location: rt breast area - nurse aware   Pain Medicine Status: Pts nurse gave pt pain meds     Cognition:    A&O Person, Place, Time and Situation   Able to follow 2 step commands, consistently. Balance:  Functional Sitting Balance:   WNL  Functional Standing Balance:dimished     Bed mobility:    Supine to sit:   Supervision with HOB elevated  Sit to supine:   Not Tested  Rolling:    Not Tested  Scooting in sitting:  Supervision - IND  Scooting to head of bed:        Not Tested   Bridging:    Not Tested    Transfers:    Sit to stand:  CGA  Stand to sit:  CGA   Bed to chair:   CGA with RW, cues for line management  Standard toilet: Not Tested  Bed to [facility]:  Not Tested    Activities of Daily Living: Pt declined other ADLs today   UB Dressing:   Not Tested  LB Dressing:    Sup-ind donning socks  UB Bathing:  Not Tested  LB Bathing:  Not Tested   Feeding:  Not Tested  Grooming:   Not Tested  Toileting:  Not Tested    Activity Tolerance:   Pt completed therapy session   SpO2 90% at rest     After transfer to chair and short distance 72% HR 92  After extended time of 2 mins and pursed lip breathing pt increased to 94% HR86    UE exercises  Sp02 86% HR 91 after rest pt improved to 90% sitting in chair     Therapeutic Exercise:   Shoulder shrugs 10x  Shoulder retraction 10x   Shoulder rolls 10x   Yellow thera band  Elbow flex/ext 10x  Shoulder external rotation 10x   Patient Education:   Role of OT  Recommendations for DC  Instructed in the use of energy conservation-PLB  Instructed in relaxation methods       Positioning Needs:   Reclined in chair with call light and needs in reach. Alarm Set    Family Present:  No    Assessment: Pt making progress toward goals. Pt was sup for bed mobility and CGA with RW for transfers. The pt did drop to 72% after short bout of ambulation which the pt requested. The pt recovered to the 90s in 2 mins with rest and pursed lip breathing on 4 liters of high flow. The pt instructed in methods of relaxation. The pt declined bathing today or hair shampoo. [place] GOALS  To be met in 3 Visits:  1). Bed to toilet/BSC: Min A with AD as needed      To be met in 5 Visits:  1). Supine to/from Sit:             Modified Independent  2). Upper Body Bathing:         Supervision  3). Lower Body Bathing:         Min A - goal met - new goal sup  4). Upper Body Dressing:       Supervision  5). Lower Body Dressing:       Min A -goal met - new goal sup-ind   6).  Pt to demonstrate UE exs x 15 reps with minimal cues       Plan: cont with POC    Barbra Riley OTR/L [id number]        If patient discharges from this facility prior to next visit, this note will serve as the Discharge Summary

## 2021-12-21 NOTE — PROGRESS NOTES
Shift assessment completed. See flow sheet., Medications given. Patient is A&O x4. Patient is currently on 8L of O2. Patient had an episode of destaturation with the use of the bedside commode but was able to recover quickly and without an increase in O2. Call light  within reach. Will continue to monitor.

## 2021-12-21 NOTE — PROGRESS NOTES
Bedside report and transfer of care given to CASSIDY Crews. Pt currently resting in bed with the call light within reach. Pt denies any other care needs at this time. Pt stable at this time.

## 2021-12-21 NOTE — PROGRESS NOTES
Patient resting comfortably in bed. Vitals stable. Oxygen level 100% at 5L. Oxygen turned down to 4L. Oxygen level 94%. Pt complaining of pain in Rt breast. Tylenol given. Heat pack also given. Call light within reach.

## 2021-12-21 NOTE — PROGRESS NOTES
Physical Therapy    Attempted PT follow up session. Pt was in chair upon arrival, says she just got there, pleasantly declined therapy due to pleural pain. Says she didn't get much sleep last night and is tired. Offered seated exercise but pt declined. Discussed benefits of continued mobility as tolerated, pt acknowledged understanding. Pt hoping to get discharged tomorrow and wants to go home; planning to stay with daughter who is available 24/7. Discussed home setup and therapy recs for ARU. Then discussed home therapy; pt unsure if it is needed. Vitals gathered for RN rounds. Will re-attempt at a later date.      Julia Galeas, PT, DPT

## 2021-12-21 NOTE — PROGRESS NOTES
Pulmonary & Critical Care Medicine Progress Note  Hospital Day: 21   CC: COVID-19    Subjective:   Down to 4 L O2. Desats with exertion but quickly recovers  Albuterol has helped some with spasms. Pt reports new/worsened right sided pleuritic chest pain with breathing & cough    Vascular lines: IV: PICC 12/3/21    Vitals:  Blood pressure 116/64, pulse 82, temperature 98.1 °F (36.7 °C), temperature source Oral, resp. rate 16, height 5' 4\" (1.626 m), weight 127 lb 5 oz (57.7 kg), SpO2 92 %, not currently breastfeeding. 4 L    General: ill appearing. Eyes: PERRL. No sclera icterus. No conjunctival injection. ENT: No discharge. Pharynx clear. Neck: Trachea midline. Normal thyroid. Resp: No accessory muscle use. ++ crackles. No wheezing. No rhonchi. No dullness on percussion. CV: Regular rate. Regular rhythm. No mumur or rub. Trace LE edema. Peripheral pulses are 2+. Capillary refill is less than 3 seconds. GI: Non-tender. Non-distended. No masses. No organomegaly. Normal bowel sounds. No hernia. Skin: Warm and dry. No nodule on exposed extremities. No rash on exposed extremities. Lymph: No cervical LAD. No supraclavicular LAD. M/S: No cyanosis. No joint deformity. No clubbing. Neuro: A&OX3. Patellar reflexes are symmetric. Psych: No agitation, no anxiety, affect is full.      Scheduled Meds:   dexamethasone  1 mg Oral 2 times per day    influenza virus vaccine  0.5 mL IntraMUSCular Prior to discharge    escitalopram  5 mg Oral Daily    QUEtiapine  12.5 mg Oral BID    psyllium  1 packet Oral Daily    vitamin D  50,000 Units Oral Weekly    famotidine  20 mg Oral BID    sodium chloride flush  5-40 mL IntraVENous 2 times per day    enoxaparin  30 mg SubCUTAneous BID    levothyroxine  50 mcg Oral Daily    liothyronine  5 mcg Oral Daily    therapeutic multivitamin-minerals  1 tablet Oral Daily     Data:  CBC:   Recent Labs     12/20/21  0520   WBC 8.2   HGB 12.2   HCT 35.9*   MCV 93.2    DVT prophylaxis: Lovenox 30 BID  CODE: DNR-CCA

## 2021-12-21 NOTE — CARE COORDINATION
INTERDISCIPLINARY PLAN OF CARE CONFERENCE    Date/Time: 12/21/2021 9:48 AM  Completed by: Jeffery Bauer RN, Case Management      Patient Name:  Juan Borrego  YOB: 1950  Admitting Diagnosis: Acute respiratory disease due to COVID-19 virus [U07.1, J06.9]  Acute respiratory failure due to COVID-19 ([address] [U07.1, J96.00]     Admit Date/Time:  12/1/2021  4:32 AM    Chart reviewed. Interdisciplinary team contacted or reviewed plan related to patient progress and discharge plans. Disciplines included Case Management, Nursing, and Dietitian. Current Status:ongoing  PT/OT recommendation for discharge plan of care: SNF    Expected D/C Disposition:  Home  Confirmed plan with patient and/or family Yes confirmed with: (name) Simba Neff (daughter)  Met with:Fritz (daughter)  Discharge Plan Comments: Reviewed chart. Role of discharge planner explained and patient's daughter, Simba Neff,  verbalized understanding. Pt lives at home alone and plans to go to her daughter's Renato Lomax) home at d/c to [address], [address]. Simba Neff and pt would like [facility] with PT/OT/SN. Following pt for home O2. Covid pos as of 12/1. Beverley BENJAMIN gave pt a pulse ox on 12/20/2021. Pt is currently on 4L of O2. (down form 8L on 12/20/2021). Pt is not on home O2.            Home O2 in place on admit: No  Pt informed of need to bring portable home O2 tank on day of discharge for nursing to connect prior to leaving:  No  Verbalized agreement/Understanding:  No

## 2021-12-21 NOTE — PROGRESS NOTES
Hospitalist Progress Note      PCP: Erik Ferguson MD    Date of Admission: 12/1/2021     covid pna, unvaccinated  Placed on Vapotherm for worsening hypoxemia      Subjective:     Ms Nataly Herrera is starting to feel some better. She has been on Vapotherm for several days, FiO2 slowly weaned down. Switch to nasal cannula now. Currently O2 sats stable on 8 L nasal cannula. Patient still weak, working with therapy does desaturate with minimal activity. .    Still gets dyspneic with activity. Sitting up in the chair. History of significant shortness of breath while talking in full sentences, this is better today. Doing incentive spirometry. Not as dyspneic while talking today        12/21  Patient is continuing to improve. O2 requirement now down to 4 L . O2 sat stable at rest, she does desaturate with exertion but recovers quickly . patient complaining of pleuritic chest pain on the right lateral chest. .  She does have some reproducible chest wall tenderness,  also pain increases with deep breathing . CT chest done and this is negative for PE ;does show bilateral airspace disease.     Medications:  Reviewed    Infusion Medications    sodium chloride Stopped (12/03/21 1632)     Scheduled Medications    dexamethasone  1 mg Oral 2 times per day    influenza virus vaccine  0.5 mL IntraMUSCular Prior to discharge    escitalopram  5 mg Oral Daily    QUEtiapine  12.5 mg Oral BID    psyllium  1 packet Oral Daily    vitamin D  50,000 Units Oral Weekly    famotidine  20 mg Oral BID    sodium chloride flush  5-40 mL IntraVENous 2 times per day    enoxaparin  30 mg SubCUTAneous BID    levothyroxine  50 mcg Oral Daily    liothyronine  5 mcg Oral Daily    therapeutic multivitamin-minerals  1 tablet Oral Daily     PRN Meds: albuterol sulfate HFA, benzonatate, melatonin, sodium chloride, oxyCODONE, bismuth subsalicylate, sodium chloride flush, sodium chloride, ondansetron **OR** ondansetron, polyethylene glycol, acetaminophen **OR** acetaminophen, guaiFENesin-dextromethorphan      Intake/Output Summary (Last 24 hours) at 12/21/2021 1259  Last data filed at 12/21/2021 1013  Gross per 24 hour   Intake 600 ml   Output 800 ml   Net -200 ml       Physical Exam Performed:    /69   Pulse 82   Temp 98 °F (36.7 °C) (Oral)   Resp 16   Ht 5' 4\" (1.626 m)   Wt 127 lb 5 oz (57.7 kg)   SpO2 93%   BMI 21.85 kg/m²   Patient seen in droplet plus precautions    General: thin  appearing elderly female    Awake, alert and oriented. No distress  Mucous Membranes:  Pink , anicteric  Neck: No JVD, no carotid bruit, no thyromegaly  Chest: Diminished breath sounds, improving air entry. no wheezes rales or rhonchi. Right lateral chest wall tenderness present  Cardiovascular:  RRR S1S2 heard, no murmurs or gallops  Abdomen:  Soft, undistended, non tender, no organomegaly, BS present  Extremities: No edema or cyanosis. Distal pulses well felt  Neurological : grossly normal with improving gen weakness      Labs:   Recent Labs     12/20/21  0520   WBC 8.2   HGB 12.2   HCT 35.9*        Recent Labs     12/20/21  0520      K 4.1   CL 99   CO2 31   BUN 12   CREATININE <0.5*   CALCIUM 8.8     No results for input(s): AST, ALT, BILIDIR, BILITOT, ALKPHOS in the last 72 hours. No results for input(s): INR in the last 72 hours. No results for input(s): [place] in the last 72 hours. Urinalysis:      Lab Results   Component Value Date    NITRU Negative 12/10/2021    BLOODU LARGE 12/10/2021    SPECGRAV 1.025 12/10/2021    GLUCOSEU Negative 12/10/2021     Cultures  Sputum - NRF   Blood cultures negative      Radiology:  CTA PULMONARY W CONTRAST   Final Result   Findings consistent with extensive bilateral pneumonia      RECOMMENDATIONS:   Unavailable         XR CHEST PORTABLE   Final Result   1. Stable appropriate position of right arm PICC.    2. No significant change in appearance of multifocal airspace disease, most   consistent with a combination of multifocal pneumonia and superimposed   pulmonary edema. XR CHEST PORTABLE   Final Result   Mild interval increase in airspace opacities involving the right mid to lower   lung field and left mid to upper lung field. Mild decrease in the airspace   opacities in the left mid to lower lung field. This is when compared to the   prior study performed 12/06/2021. XR CHEST PORTABLE   Final Result   Moderate diffuse airspace disease compatible with multifocal pneumonia with   asymmetric involvement and consolidation left lower lobe. This could   represent COVID pneumonia with superimposed edema. IR PICC WO SQ PORT/PUMP > 5 YEARS   Final Result   Successful placement of PICC line. CT CHEST PULMONARY EMBOLISM W CONTRAST   Final Result   No evidence of pulmonary embolism. Extensive bilateral multifocal airspace disease compatible with multifocal   pneumonia, specifically COVID pneumonia. XR CHEST PORTABLE   Final Result   Multifocal peripherally located pulmonary opacities are seen, likely related   to COVID-19 pneumonia. Assessment/Plan:    COVID PNA  Acute Hypoxic Respiratory Failure/ARDS    - Pt is unvaccinated  - imaging with bilateral infiltrates   - does not wear O2 at baseline  - Admit to Med Surg, droplet + precautions, tele  - progressive hypoxia needing ICU transfer and currently on vapotherm, alternating with bipap  -Hypoxemia improved now, weaned off of Vapotherm, currently on O2 8 L -> 4L per nasal cannula. Continue to wean O2 as tolerated  - being treated with Decadron ,  day #20. Dose titrated down to 1 mg now  - Status post Tocilizumab 12/3/2021. Status post 2 doses of baricitinib  - PRN Robitussin  - lovenox bid   -  incentive spirometry   - albuterol MDI prn  -CT PA done today, PE ruled out.   She has bilateral airspace disease    Possible sec bacterial infection  - risk for gram neg infection   - completed levaquin for 5 days, later on zosyn for 5 days - off now   - cx remain neg      Severe anxiety   -Was requiring Precedex drip.  now changed to seroquel bid  Resumed lexapro    Acquired hypothyroidism. Continue home medication.     Weakness and debility  -Continue PT OT      dvt prophylaxis with lovenox bid  Start ambulation    Diet: ADULT DIET; Regular  ADULT ORAL NUTRITION SUPPLEMENT; Breakfast, Lunch, Dinner; Low Calorie/High Protein Oral Supplement  Code Status: DNR-CCA      Patient slowly improving.   Hopefully can go home with home health care in       Job Chao MD, 12/21/2021 12:59 PM

## 2021-12-22 PROCEDURE — 2580000003 HC RX 258: Performed by: INTERNAL MEDICINE

## 2021-12-22 PROCEDURE — 6370000000 HC RX 637 (ALT 250 FOR IP): Performed by: INTERNAL MEDICINE

## 2021-12-22 PROCEDURE — 94761 N-INVAS EAR/PLS OXIMETRY MLT: CPT

## 2021-12-22 PROCEDURE — 6370000000 HC RX 637 (ALT 250 FOR IP): Performed by: NURSE PRACTITIONER

## 2021-12-22 PROCEDURE — 2060000000 HC ICU INTERMEDIATE R&B

## 2021-12-22 PROCEDURE — 2700000000 HC OXYGEN THERAPY PER DAY

## 2021-12-22 PROCEDURE — 97530 THERAPEUTIC ACTIVITIES: CPT

## 2021-12-22 PROCEDURE — 6360000002 HC RX W HCPCS: Performed by: INTERNAL MEDICINE

## 2021-12-22 PROCEDURE — 99232 SBSQ HOSP IP/OBS MODERATE 35: CPT | Performed by: INTERNAL MEDICINE

## 2021-12-22 PROCEDURE — 97110 THERAPEUTIC EXERCISES: CPT

## 2021-12-22 RX ADMIN — SODIUM CHLORIDE, PRESERVATIVE FREE 10 ML: 5 INJECTION INTRAVENOUS at 20:41

## 2021-12-22 RX ADMIN — SODIUM CHLORIDE, PRESERVATIVE FREE 10 ML: 5 INJECTION INTRAVENOUS at 09:54

## 2021-12-22 RX ADMIN — ENOXAPARIN SODIUM 30 MG: 100 INJECTION SUBCUTANEOUS at 09:53

## 2021-12-22 RX ADMIN — PSYLLIUM HUSK 1 PACKET: 3.4 POWDER ORAL at 09:54

## 2021-12-22 RX ADMIN — LEVOTHYROXINE SODIUM 50 MCG: 0.03 TABLET ORAL at 06:54

## 2021-12-22 RX ADMIN — LIOTHYRONINE SODIUM 5 MCG: 5 TABLET ORAL at 09:53

## 2021-12-22 RX ADMIN — FAMOTIDINE 20 MG: 20 TABLET ORAL at 20:40

## 2021-12-22 RX ADMIN — ESCITALOPRAM OXALATE 5 MG: 10 TABLET ORAL at 09:53

## 2021-12-22 RX ADMIN — DEXAMETHASONE 1 MG: 1 TABLET ORAL at 09:53

## 2021-12-22 RX ADMIN — QUETIAPINE FUMARATE 12.5 MG: 25 TABLET ORAL at 09:53

## 2021-12-22 RX ADMIN — ENOXAPARIN SODIUM 30 MG: 100 INJECTION SUBCUTANEOUS at 20:40

## 2021-12-22 RX ADMIN — QUETIAPINE FUMARATE 12.5 MG: 25 TABLET ORAL at 20:40

## 2021-12-22 RX ADMIN — MULTIPLE VITAMINS W/ MINERALS TAB 1 TABLET: TAB at 09:53

## 2021-12-22 RX ADMIN — FAMOTIDINE 20 MG: 20 TABLET ORAL at 09:53

## 2021-12-22 RX ADMIN — Medication 10 MG: at 20:39

## 2021-12-22 ASSESSMENT — PAIN SCALES - GENERAL
PAINLEVEL_OUTOF10: 0
PAINLEVEL_OUTOF10: 0

## 2021-12-22 NOTE — PROGRESS NOTES
Inpatient Physical Therapy Daily Treatment Note    Unit: PCU  Date:  12/22/2021  Patient Name:    Travis Soares  Admitting diagnosis:  Acute respiratory disease due to COVID-19 virus [U07.1, J06.9]  Acute respiratory failure due to COVID-19 [facility]) [U07.1, J96.00]  Admit Date:  12/1/2021  Precautions/Restrictions:  Fall risk, Lines -Supplemental O2 (4.5L), Telemetry, Continuous pulse oximetry and Isolation Precautions: Droplet Plus - COVID         Discharge Recommendations: ARU/IRF (inpatient rehab facility)   DME needs for discharge: defer to facility    Comment: Pt adamant about discharging to home (daughters house). Will need RW. Therapy recommendation for EMS Transport: can transport by wheelchair    Therapy recommendations for staff:   Assist of 1 with use of gait belt and hand held assist for all transfers to/from chair    History of Present Illness: Per Dr. Mulu Mahmood progress note 157: \"80year-old female admitted to hospital with COVID-19. Jeet Perez has a past medical history of depression.  She tested positive for Covid about 10 days ago. Jeet Perez apparently was taking ivermectin at home. Jeet Perez came to the emergency room and had a saturation of 70%.  She was admitted to hospital and then transferred the ICU.  She is presently on Vapotherm.  Pulmonologist discussed with patient and family and apparently she wanted to be a DNI. Marissa Leiva came and told me later on that she was considering intubation.  She is unvaccinated. 12/3-was 100% Vapotherm during rounds.  She was down to 80% Vapotherm in the prone position but had to be made supine for PICC line insertion.  Plan is to reprone her and try to decrease FiO2.  She is on Precedex  12/4-patient has been manually self proning.  FiO2 down to 80%.  On 40 L/min.  Refused BiPAP overnight   12/5- used Bipap last night. On 80% vapo therm. Feeling about the same.    12/6  Currently on Vapotherm 100% 40 L   BiPAP overnight  12/7  Now on Vapotherm 70% FiO2 40 L/min  Used BiPAP overnight  She is  self proning\"    Home Health S4 Level Recommendation: Level 1 Standard  AM-PAC Mobility Score   AM-PAC Inpatient Mobility Raw Score : 17  AM-PAC Inpatient Mobility without Stair Climbing Raw Score : 16      Treatment Time:  11:48-12:13  Treatment number: 4  Timed Code Treatment Minutes: 25 minutes  Total Treatment Minutes:  25  minutes    Cognition    A&O x4   Able to follow 2 step commands    Subjective  Patient lying supine in bed with no family present. Pt agreeable to this PT tx. Pain   No  Location:   Rating:    NA/10  Pain Medicine Status: No request made     Bed Mobility   Supine to Sit:    Not Tested   Sit to Supine:   Not Tested  Rolling:   Not Tested  Scooting:   Not Tested    Transfer Training  (to/from recliner and EOB)   Sit to stand:   Supervision  Stand to sit:   Supervision  Bed to Chair:   Supervision with use of rolling walker (RW)       Gait Training   Distance:  15 ft + seated rest + 25 ft (distance limited by fatigue)     Deviations (firm surface/linoleum): decreased alysha, decreased step length bilaterally, lack of heel strike/toe off bilaterally. Assistive Device Used:  Rolling walker    Level of Assist: Supervision     Comment: Pt took frequent brief standing rest breaks to manage shortness of breath. Good balance. Stair Training deferred, pt unsafe/not appropriate to complete stairs at this time  # of Steps:   N/A  Level of Assist:  Not Tested  UE Support:  NA  Assistive Device:  N/A  Pattern:   N/A  Comments:     Therapeutic Exercise all completed bilaterally unless indicated  LAQ x 5 reps  Discussed and demonstrated seated therex with yellow theraband which is already in pt's room - leg press, ankle pumps, hip abduction. Balance  Sitting:  Good ; Not Tested  Comments: good upright posture    Standing: Fair +; Supervision  Comments: light use of UE support on walker, not so much for balance as for assistance managing fatigue.     Patient Education restrictive assistive device)  5). Tolerate B LE exercises 3 sets of 10-15 reps  6). Ascend/descend 2 steps with SBA with use of hand rail unilateral and LRAD (least restrictive assistive device)    Plan   3-5x/wk. Signature: Julia Galeas, PT, DPT      If patient discharges from this facility prior to next visit, this note will serve as the Discharge Summary.

## 2021-12-22 NOTE — CARE COORDINATION
INTERDISCIPLINARY PLAN OF CARE CONFERENCE    Date/Time: 12/22/2021 10:48 AM  Completed by: Royce Bell RN, Case Management      Patient Name:  Carmen Christianson  YOB: 1950  Admitting Diagnosis: Acute respiratory disease due to COVID-19 virus [U07.1, J06.9]  Acute respiratory failure due to COVID-19 ([address] [U07.1, J96.00]     Admit Date/Time:  12/1/2021  4:32 AM    Chart reviewed. Interdisciplinary team contacted or reviewed plan related to patient progress and discharge plans. Disciplines included Case Management, Nursing, and Dietitian. Current Status:ongoing  PT/OT recommendation for discharge plan of care: ARU    Expected D/C Disposition:  Home  Confirmed plan with patient and/or family Yes confirmed with: (name)pt  Met with: pt  Discharge Plan Comments: Reviewed chart. Role of discharge planner explained and patient verbalized understanding. Pt is from home alone. Pt is aware that PT/OT recommends ARU. Pt declines ARU and declines SNF. Per conversation with pt's daughter Mindy Santos, she declines ARU and SNF for pt, as well. Pt and Mindy Santos plan for pt to come to [facility] at d/c at   [address], [address] and [facility]/OT/SN/SW (they have a friend that works for [employer]). Mindy Santos will be with pt for 24/7 assist.     [place] follow for possible home O2 needs. Pt was on 4L of O2 yesterday and currently on 6L.     C19 pos as of 12/1/2021    Home O2 in place on admit: No  Pt informed of need to bring portable home O2 tank on day of discharge for nursing to connect prior to leaving:  No  Verbalized agreement/Understanding:  No

## 2021-12-22 NOTE — PROGRESS NOTES
Comprehensive Nutrition Assessment    Type and Reason for Visit:  Reassess    Nutrition Recommendations/Plan:   1. Continue Regular diet  2. Continue Ensure HP TID    Nutrition Assessment:  patient has signifcnalty improved from a nutritional standpoint AEB patient consumed adequate po nutrition > 50% x 5 days , however, she remains at risk for further compromise d/t she has sustained a 7% weight loss x 21 days d/t  respiratory dysfunction r/t COVID-19 virus, increased nutrition needs r/t COVID-19 virus,  will continue ADULT DIET; Regular diet order and Ensure high-protein with meals    Malnutrition Assessment:  Malnutrition Status: At risk for malnutrition (Comment)    Context:  Acute Illness     Findings of the 6 clinical characteristics of malnutrition:  Energy Intake:  7 - 50% or less of estimated energy requirements for 5 or more days  Weight Loss:  Unable to assess (only a stated weight was obtained on 12/1/21)     Body Fat Loss:  Unable to assess (COVID-19 +)     Muscle Mass Loss:  Unable to assess (COVID-19 +)    Fluid Accumulation:  No significant fluid accumulation     Strength:  Not Performed    Estimated Daily Nutrient Needs:  Energy (kcal):  1472 - 1600 kcals based on 23-25 kcals/kg/CBW; Weight Used for Energy Requirements:  Current     Protein (g):  77 - 90 g protein based on 1.2-1.4 g/kg/CBW; Weight Used for Protein Requirements:  Current        Fluid (ml/day):  1472 - 1600 ml; Method Used for Fluid Requirements:  1 ml/kcal      Nutrition Related Findings:  po intakes are > 50% x 5 days, BM 12/20 ; O2 @ 4L /NC      Wounds:  None       Current Nutrition Therapies:    ADULT DIET; Regular  ADULT ORAL NUTRITION SUPPLEMENT; Breakfast, Lunch, Dinner;  Low Calorie/High Protein Oral Supplement    Anthropometric Measures:  · Height: 5' 4\" (162.6 cm)  · Current Body Weight: 129 lb (58.5 kg)   · Admission Body Weight: 140 lb (63.5 kg) (obtained on 12/1/21; stated weight)    · Usual Body Weight: 146 lb (66.2 kg) (obtained on 6/18/21; unknown whether actual weight or stated/estimated weight)     · Ideal Body Weight: 120 lbs; % Ideal Body Weight 116.7 %   · BMI: 22.1  · Adjusted Body Weight:  ; No Adjustment   · BMI Categories: Normal Weight (BMI 22.0 to 24.9) age over 72       Nutrition Diagnosis:   · Inadequate oral intake related to inadequate protein-energy intake,impaired respiratory function,increase demand for energy/nutrients as evidenced by poor intake prior to admission,intake 26-50%,intake 51-75%,lab values,constipation,other (comment) (COVID-19 +)      Nutrition Interventions:   Food and/or Nutrient Delivery:  Continue Current Diet,Continue Oral Nutrition Supplement  Nutrition Education/Counseling:  No recommendation at this time   Coordination of Nutrition Care:  Continue to monitor while inpatient    Goals:  patient will consume 50% or greater of meals on ADULT DIET; Regular diet order x 3 meals per day and she will consume 50% or greater of Ensure high-protein with meals       Nutrition Monitoring and Evaluation:   Behavioral-Environmental Outcomes:  None Identified   Food/Nutrient Intake Outcomes:  Food and Nutrient Intake,Supplement Intake  Physical Signs/Symptoms Outcomes:  Weight,Nutrition Focused Physical Findings,Biochemical Data     Discharge Planning:     Too soon to determine     Electronically signed by Janice Costa RD, LD on 12/22/21 at 5:49 PM EST    Contact: [phone]

## 2021-12-22 NOTE — PROGRESS NOTES
Returned call to pt's daughter, Lorren Schilder. Daughter inquiring if pt going home tomorrow, informed daughter I was not sure but that pt said same thing and that day shift RN would contact her tomorrow if pt receives d/c order. Daughter also inquiring about home O2, informed her that I was unaware of how that would work and case management is the person who would be able to help her set all that up. Daughter satisfied with answers from this writer and states she will be out to see pt tomorrow.

## 2021-12-22 NOTE — PROGRESS NOTES
Hospitalist Progress Note      PCP: Tavares Berrios MD    Date of Admission: 12/1/2021     covid pna, unvaccinated  Placed on Vapotherm for worsening hypoxemia      Subjective:     Ms Jorge Addison is starting to feel some better. She has been on Vapotherm for several days, FiO2 slowly weaned down. Switch to nasal cannula now. Currently O2 sats stable on 8 L -> 4L nasal cannula. Needing 6 L with activity. Patient still weak, working with therapy. She does desaturate with minimal activity. .    Still gets dyspneic with activity. Sitting up in the chair. She had episode of pleuritic chest pain. CT chest done,  PE ruled out.   Chest pain is better now      Medications:  Reviewed    Infusion Medications    sodium chloride Stopped (12/03/21 1632)     Scheduled Medications    dexamethasone  1 mg Oral Daily    influenza virus vaccine  0.5 mL IntraMUSCular Prior to discharge    escitalopram  5 mg Oral Daily    QUEtiapine  12.5 mg Oral BID    psyllium  1 packet Oral Daily    vitamin D  50,000 Units Oral Weekly    famotidine  20 mg Oral BID    sodium chloride flush  5-40 mL IntraVENous 2 times per day    enoxaparin  30 mg SubCUTAneous BID    levothyroxine  50 mcg Oral Daily    liothyronine  5 mcg Oral Daily    therapeutic multivitamin-minerals  1 tablet Oral Daily     PRN Meds: ketorolac, albuterol sulfate HFA, benzonatate, melatonin, sodium chloride, oxyCODONE, bismuth subsalicylate, sodium chloride flush, sodium chloride, ondansetron **OR** ondansetron, polyethylene glycol, acetaminophen **OR** acetaminophen, guaiFENesin-dextromethorphan      Intake/Output Summary (Last 24 hours) at 12/22/2021 1350  Last data filed at 12/22/2021 0931  Gross per 24 hour   Intake 420 ml   Output 1400 ml   Net -980 ml       Physical Exam Performed:    /61   Pulse 87   Temp 97.8 °F (36.6 °C) (Oral)   Resp 18   Ht 5' 4\" (1.626 m)   Wt 129 lb 1.6 oz (58.6 kg)   SpO2 93%   BMI 22.16 kg/m²   Patient seen in droplet plus precautions    General: thin  appearing elderly female    Awake, alert and oriented. No distress  Mucous Membranes:  Pink , anicteric  Neck: No JVD, no carotid bruit, no thyromegaly  Chest: Diminished breath sounds, improving air entry. no wheezes rales or rhonchi. Right lateral chest wall tenderness present  Cardiovascular:  RRR S1S2 heard, no murmurs or gallops  Abdomen:  Soft, undistended, non tender, no organomegaly, BS present  Extremities: No edema or cyanosis. Distal pulses well felt  Neurological : grossly normal with improving gen weakness      Labs:   Recent Labs     12/20/21  0520   WBC 8.2   HGB 12.2   HCT 35.9*        Recent Labs     12/20/21  0520      K 4.1   CL 99   CO2 31   BUN 12   CREATININE <0.5*   CALCIUM 8.8     No results for input(s): AST, ALT, BILIDIR, BILITOT, ALKPHOS in the last 72 hours. No results for input(s): INR in the last 72 hours. No results for input(s): [place] in the last 72 hours. Urinalysis:      Lab Results   Component Value Date    NITRU Negative 12/10/2021    BLOODU LARGE 12/10/2021    SPECGRAV 1.025 12/10/2021    GLUCOSEU Negative 12/10/2021     Cultures  Sputum - NRF   Blood cultures negative      Radiology:  CTA PULMONARY W CONTRAST   Final Result   Findings consistent with extensive bilateral pneumonia      RECOMMENDATIONS:   Unavailable         XR CHEST PORTABLE   Final Result   1. Stable appropriate position of right arm PICC. 2. No significant change in appearance of multifocal airspace disease, most   consistent with a combination of multifocal pneumonia and superimposed   pulmonary edema. XR CHEST PORTABLE   Final Result   Mild interval increase in airspace opacities involving the right mid to lower   lung field and left mid to upper lung field. Mild decrease in the airspace   opacities in the left mid to lower lung field. This is when compared to the   prior study performed 12/06/2021.          XR CHEST Dinner; Low Calorie/High Protein Oral Supplement  Code Status: DNR-CCA      Patient slowly improving.   Hopefully can go home with home health care in       Robbin Palencia MD, 12/22/2021 1:50 PM

## 2021-12-22 NOTE — PROGRESS NOTES
Pulmonary & Critical Care Medicine Progress Note  Hospital Day: 22   CC: COVID-19    Subjective:   On 3-6 L O2. Exertional desats with quick recovery  Albuterol has helped some with spasms. Pleuritic pain has resolved    Vascular lines: IV: PICC 12/3/21    Vitals:  Blood pressure 132/72, pulse 103, temperature 98 °F (36.7 °C), temperature source Oral, resp. rate 22, height 5' 4\" (1.626 m), weight 129 lb 1.6 oz (58.6 kg), SpO2 94 %, not currently breastfeeding. On 6 L, turned down to 4.5 L  Constitutional:  No acute distress. HENT:  Oropharynx is clear and moist.   Neck: No tracheal deviation present. Cardiovascular: Normal heart sounds. No lower extremity edema. Pulmonary/Chest: No wheezes. No rhonchi. + rales. No decreased breath sounds. No accessory muscle usage or stridor. Musculoskeletal: No cyanosis. No clubbing. Skin: Skin is warm and dry. Psychiatric: Normal mood and affect. Neurologic: speech fluent, alert and oriented, strength symmetric        Scheduled Meds:   dexamethasone  1 mg Oral Daily    influenza virus vaccine  0.5 mL IntraMUSCular Prior to discharge    escitalopram  5 mg Oral Daily    QUEtiapine  12.5 mg Oral BID    psyllium  1 packet Oral Daily    vitamin D  50,000 Units Oral Weekly    famotidine  20 mg Oral BID    sodium chloride flush  5-40 mL IntraVENous 2 times per day    enoxaparin  30 mg SubCUTAneous BID    levothyroxine  50 mcg Oral Daily    liothyronine  5 mcg Oral Daily    therapeutic multivitamin-minerals  1 tablet Oral Daily     Data:  CBC:   Recent Labs     12/20/21  0520   WBC 8.2   HGB 12.2   HCT 35.9*   MCV 93.2        BMP:   Recent Labs     12/20/21  0520      K 4.1   CL 99   CO2 31   BUN 12   CREATININE <0.5*     LIVER PROFILE:   No results for input(s): AST, ALT, LIPASE, BILIDIR, BILITOT, ALKPHOS in the last 72 hours. Invalid input(s):   AMYLASE,  ALB    Microbiology:  12/1/2021 BC NG  12/10/2021 Resp NG  12/10/2021 BC NG   12/10/2021 UC NG    Imaging:  CTPA 12/1/2021  1. No evidence of pulmonary embolism. 2. Extensive bilateral multifocal airspace disease compatible with multifocal   pneumonia, specifically COVID pneumonia. CXR 12/18/2021   1. Stable appropriate position of right arm PICC. 2. No significant change in appearance of multifocal airspace disease, most   consistent with a combination of multifocal pneumonia and superimposed   pulmonary edema. CTPA 12/21/2021 personally reviewed  Pulmonary Arteries: Pulmonary arteries are adequately opacified for evaluation.  No evidence of intraluminal filling defect to suggest pulmonary embolism.  Main pulmonary artery is normal in caliber. Mediastinum: There is mild diffuse mediastinal adenopathy which is likely reactive. Lungs/pleura: There is extensive diffuse bilateral ground-glass type airspace disease.       Impression   Findings consistent with extensive bilateral pneumonia     Creatinine 0.5     ASSESSMENT:  Acute hypoxemic respiratory failure, improving  COVID-19 viral pneumonia in an unvaccinated patient  ARDS  Pleurisy, ruled out PE - resolved      PLAN:  COVID-19 isolation, droplet plus  Supplemental oxygen to maintain SaO2 >92%; wean as tolerated    Albuterol MDI for bronchospasms  Decadron D#22, now down to 1 mg qd  Completed 5 days Zosyn, 5 days Levaquin  S/P Tocilizumab x1 12/3/21; s/p 2 doses of Baricitinib  Inhaled bronchodilators only as needed, MDI preferred  PT/OT  OK for toradol 15 mg PRN pleurisy   DVT prophylaxis: Lovenox 30 BID  CODE: DNR-CCA  Follow up with PCP in 1-2 weeks.  I recommend repeat chest imaging in 6 weeks to document clearing of COVID19 infiltrates

## 2021-12-22 NOTE — PLAN OF CARE
Problem: Pain:  Goal: Pain level will decrease  Description: Pain level will decrease  Outcome: Ongoing  Goal: Control of acute pain  Description: Control of acute pain  Outcome: Ongoing  Goal: Control of chronic pain  Description: Control of chronic pain  Outcome: Ongoing  Goal: Patient's pain/discomfort is manageable  Description: Patient's pain/discomfort is manageable  Outcome: Ongoing     Problem: Infection:  Goal: Will remain free from infection  Description: Will remain free from infection  Outcome: Ongoing     Problem: Safety:  Goal: Free from accidental physical injury  Description: Free from accidental physical injury  Outcome: Ongoing  Goal: Free from intentional harm  Description: Free from intentional harm  Outcome: Ongoing     Problem: Daily Care:  Goal: Daily care needs are met  Description: Daily care needs are met  Outcome: Ongoing     Problem: Skin Integrity:  Goal: Skin integrity will stabilize  Description: Skin integrity will stabilize  Outcome: Ongoing     Problem: Discharge Planning:  Goal: Patients continuum of care needs are met  Description: Patients continuum of care needs are met  Outcome: Ongoing     Problem: Airway Clearance - Ineffective  Goal: Achieve or maintain patent airway  Outcome: Ongoing     Problem: Gas Exchange - Impaired  Goal: Absence of hypoxia  Outcome: Ongoing  Goal: Promote optimal lung function  Outcome: Ongoing     Problem: Breathing Pattern - Ineffective  Goal: Ability to achieve and maintain a regular respiratory rate  Outcome: Ongoing     Problem:  Body Temperature -  Risk of, Imbalanced  Goal: Ability to maintain a body temperature within defined limits  Outcome: Ongoing  Goal: Will regain or maintain usual level of consciousness  Outcome: Ongoing  Goal: Complications related to the disease process, condition or treatment will be avoided or minimized  Outcome: Ongoing     Problem: Isolation Precautions - Risk of Spread of Infection  Goal: Prevent transmission of infection  Outcome: Ongoing     Problem: Nutrition Deficits  Goal: Optimize nutritional status  Outcome: Ongoing     Problem: Risk for Fluid Volume Deficit  Goal: Maintain normal heart rhythm  Outcome: Ongoing  Goal: Maintain absence of muscle cramping  Outcome: Ongoing  Goal: Maintain normal serum potassium, sodium, calcium, phosphorus, and pH  Outcome: Ongoing     Problem: Loneliness or Risk for Loneliness  Goal: Demonstrate positive use of time alone when socialization is not possible  Outcome: Ongoing     Problem: Fatigue  Goal: Verbalize increase energy and improved vitality  Outcome: Ongoing     Problem: Patient Education: Go to Patient Education Activity  Goal: Patient/Family Education  Outcome: Ongoing     Problem: Falls - Risk of:  Goal: Will remain free from falls  Description: Will remain free from falls  Outcome: Ongoing  Goal: Absence of physical injury  Description: Absence of physical injury  Outcome: Ongoing     Problem: Skin Integrity:  Goal: Will show no infection signs and symptoms  Description: Will show no infection signs and symptoms  Outcome: Ongoing  Goal: Absence of new skin breakdown  Description: Absence of new skin breakdown  Outcome: Ongoing     Problem: Nutrition  Goal: Optimal nutrition therapy  Outcome: Ongoing  Goal: Understanding of nutritional guidelines  Outcome: Ongoing

## 2021-12-22 NOTE — PROGRESS NOTES
Pt up to chair watching TV. She denies any pain or SOB at this time. Assessment complete-see flowsheet. Medications given-see MAR. Pt helped back to bed. O2 sat dropped into 70s during ambulation but pt recovered into 90s within a few min on 4L O2. She denies further needs, call light and bedside table within reach. Will continue to monitor.

## 2021-12-22 NOTE — PLAN OF CARE
Nutrition Problem #1: Inadequate oral intake  Intervention: Food and/or Nutrient Delivery: Continue Current Diet,Continue Oral Nutrition Supplement  Nutritional Goals: patient will consume 50% or greater of meals on ADULT DIET;  Regular diet order x 3 meals per day and she will consume 50% or greater of Ensure high-protein with meals

## 2021-12-22 NOTE — PLAN OF CARE
Problem: Pain:  Goal: Control of acute pain  Description: Control of acute pain  12/22/2021 1108 by Radha Agosto RN  Outcome: Ongoing  12/22/2021 0419 by Rain Hare RN  Outcome: Ongoing     Problem: Infection:  Goal: Will remain free from infection  Description: Will remain free from infection  12/22/2021 1108 by Radha Agosto RN  Outcome: Ongoing  12/22/2021 0419 by Rain Hare RN  Outcome: Ongoing     Problem: Safety:  Goal: Free from accidental physical injury  Description: Free from accidental physical injury  12/22/2021 1108 by Radha Agosto RN  Outcome: Ongoing  12/22/2021 0419 by Rain Hare RN  Outcome: Ongoing     Problem: Daily Care:  Goal: Daily care needs are met  Description: Daily care needs are met  12/22/2021 1108 by Radha Agosto RN  Outcome: Ongoing  12/22/2021 0419 by Rain Hare RN  Outcome: Ongoing

## 2021-12-22 NOTE — PROGRESS NOTES
Verbal report and transfer of care given to [facility], [address] [facility]. Pt currently resting in bed with the call light within reach. Pt stable at this time.

## 2021-12-22 NOTE — PROGRESS NOTES
Occupational Therapy Daily Treatment Note    Unit: PCU   Date:  12/22/2021  Patient Name:    Carmelina Fabry  Admitting diagnosis:  Acute respiratory disease due to COVID-19 virus [U07.1, J06.9]  Acute respiratory failure due to COVID-19 [facility]) [U07.1, J96.00]  Admit Date:  12/1/2021  Precautions/Restrictions:  Fall risk, Lines -Supplemental O2 4 liters high flow  Telemetry, Continuous pulse oximetry and Isolation Precautions: Droplet Plus - COVID    Treatment Time:  915-955   Treatment number:  7  Total Treatment Time:   40  minutes    Patient Goals for Session:  \" I really want to take a nap however Pt agreed to work with OT\"      Discharge Recommendations: ARU pt declining ARU/SNF   DME needs for discharge: shower chair     Patient plans to d/c to dtr's house with 24 hour assist and HHOT/PT. Therapy recommendations for staff:  Assist of 1 with use of gait belt and RW for all transfers to/from BSC/chair    History of Present Illness: Giorgio Rodriguez unvaccinated 80 y.o. female  With Vital signs of /65   Pulse 94   Temp 99.4 °F (37.4 °C) (Oral)   Resp 22   Ht 5' 4\" (1.626 m)   Wt 140 lb (63.5 kg)   SpO2 94%   BMI 24.03 kg/m²  who presents to the ED with a complaint of shortness of breath.  Patient seen and evaluated in room 2.  Patient was diagnosed with Covid 2 weeks ago with a positive Covid test.  Since then she has been doing fine until the this evening when she started to get more short of breath.  She says she tested her pulse oximetry level and it was found to be in the seventies.  But got progressively better.  Upon arrival here in the emergency department her pulse oximetry reading is 94% on a nonrebreather mask.  She denies any chest pain no leg swelling.  Patient was treated with 1 DuoNeb on the way into the emergency department by EMS.  No other complaints, modifying factors or associated symptoms  12/3-was 100% Vapotherm during rounds.  She was down to 80% Vapotherm in the prone position but had to be made supine for PICC line insertion.  Plan is to reprone her and try to decrease FiO2.  She is on Precedex  12/4-patient has been manually self proning.  FiO2 down to 80%.  On 40 L/min.  Refused BiPAP overnight   12/5- used Bipap last night. On 80% vapo therm. Feeling about the same. 12/6  Currently on Vapotherm 100% 40 L   BiPAP overnight  12/7  Now on Vapotherm 70% FiO2 40 L/min  Used BiPAP overnight  She is  self proning\"    Home Health S4 Level Recommendation:  NA    AM-PAC Score: AM-PAC Inpatient Daily Activity Raw Score: 19  Pt scored a 14/24 on the AM-PAC ADL Inpatient form. Current research shows that an AM-PAC score of 17 or less is typically not associated with a discharge to the patient's home setting. Subjective:  Pt in bed, agreeable to therapy. Pain   No pain complaints     Cognition:    A&O Person, Place, Time and Situation   Able to follow 2 step commands, consistently. Balance:  Functional Sitting Balance: WNL  Functional Standing Balance:diminshed     Bed mobility:    Supine to sit:   Supervision - ind with HOB elevated  Sit to supine:   Not Tested  Rolling:    Not Tested  Scooting in sitting:  IND  Scooting to head of bed:        Not Tested   Bridging:    Not Tested    Transfers:    Sit to stand:  CGA  Stand to sit:  CGA   Bed to chair:   CGA with RW, cues for line management  Standard toilet: Not Tested  Bed to [facility]:  Not Tested    Activities of Daily Living: Pt declined other ADLs today.  Stated her dtr would assist her this afternoon instructed the pt the importance of increasing   UB Dressing:   Not Tested  LB Dressing:    NT   UB Bathing:  Not Tested  LB Bathing:  Not Tested   Feeding:  Not Tested  Grooming:   Not Tested  Toileting:  Not Tested    Activity Tolerance:   Pt completed therapy session   SpO2 92% at side of bed HR 97% Pt on 6 liters   After ambulation 80% and after one min pt improved to 90% with sitting and pursed lip breathing       UE exercises Sp02 92% HR 91  % HR      Therapeutic Exercise:   Shoulder shrugs 10x  Shoulder retraction 10x   Shoulder rolls 10x   Yellow thera band  Elbow flex/ext 15x  Shoulder external rotation 15x   Shoulder horizontal abduction 10x   Patient Education:   Role of OT  Recommendations for DC  Instructed in the use of energy conservation-PLB  Instructed in relaxation methods       Positioning Needs:   Reclined in chair with call light and needs in reach. Alarm Set    Family Present:  No    Assessment: Pt making progress toward goals. Pt was IND- sup for bed mobility and CGA with RW for transfers. The pt did drop to 80 % after short bout of ambulation which the pt requested. The pt recovered to the 90s in one min with rest and pursed lip breathing on 6  liters of high flow. The pt instructed in methods of relaxation. The pt declined bathing today or hair shampoo. Colette Jensen GOALS  To be met in 3 Visits:  1). Bed to toilet/BSC: Min A with AD as needed      To be met in 5 Visits:  1). Supine to/from Sit:             Modified Independent  2). Upper Body Bathing:         Supervision  3). Lower Body Bathing:         Min A - goal met - new goal sup  4). Upper Body Dressing:       Supervision  5). Lower Body Dressing:       Min A -goal met - new goal sup-ind   6).  Pt to demonstrate UE exs x 15 reps with minimal cues       Plan: cont with JOSE LUIS Solano OTR/L [id number]        If patient discharges from this facility prior to next visit, this note will serve as the Discharge Summary

## 2021-12-22 NOTE — FLOWSHEET NOTE
provided listening and support for patient's daughters Collette Lai and [employer]). Miguel Jones states that she is frustrated with lack of information about pt's care and medications; and she questions pt's decision-making ability because patient had a difficult night on 12/5, after which she exhibited some confusion. Additionally, Miguel Jones states that she is fearful that intubation will be forced upon patient, when patient has stated that she does not wish to be intubated. Writer listened with empathy and promised to share Fritz's concerns with RN. After lengthy conversation with Stefano Garcia RN, it is this 's understanding that patient is able to be her own decision maker and has stated that she would accept intubation if it became necessary to save her life. RN stated that she will touch base with Miguel Jones this evening to reassure her that patient's wishes are being honored. Spiritual Care will continue to follow for spiritual support of patient and family. 12/06/21 0842   Encounter Summary   Services provided to: Family   Referral/Consult From: Family   Continue Visiting   (12/6 Family concerned about care.)   Complexity of Encounter Moderate   Length of Encounter 1 hour   Routine   Type Follow up   Assessment Tearful; Angry;  Resentful   Intervention Active listening; Explored feelings, thoughts, concerns   Outcome Engaged in conversation
12.10.21/Nurse informed me that the patient and family have made arrangements for medical care without any CPR just only medication.      12/10/21 1440   Encounter Summary   Services provided to: Patient not available   Referral/Consult From: Other    Continue Visiting   (12.10.21/Nurse said patient had made arrangements for AD)   Complexity of Encounter Moderate   Length of Encounter 15 minutes
12/01/21 1322   Vital Signs   Temp 98.9 °F (37.2 °C)   Temp Source Oral   Pulse 84   Heart Rate Source Monitor   Resp 20   /83   Patient Position Supine   Level of Consciousness Alert (0)   MEWS Score 1   Oxygen Therapy   SpO2 90 %   O2 Device Nasal cannula   O2 Flow Rate (L/min) 6 L/min   Pt arrived to room 231. Telemetry in place. O2 switched over. 1st set of vitals above. Admission questions started. Pt denies any further needs at this time. Bed locked and in lowest position, side rails up x2, call light within reach.
12/01/21 1400   Oxygen Therapy   SpO2 (!) 86 %   O2 Device Nasal cannula   O2 Flow Rate (L/min) 6 L/min   Pt O2 turned up to 8L high flow NC at 91%.
12/02/21 0714   Handoff   Communication Given Transfer Handoff   Oncoming Nurse/Offgoing Nurse Enrique/Tammy   Handoff Communication Face to Face   Time Handoff Given 7331
12/07/21 1230   Encounter Summary   Services provided to: Patient   Referral/Consult From: Other    Support System Children; Family members   Continue Visiting   (12-7, phone prayer support)   Complexity of Encounter Low   Length of Encounter 15 minutes   Spiritual/Confucianist   Type Spiritual support   Assessment Calm; Approachable   Intervention Active listening; Prayer   Outcome Receptive   Follow up with patient for support phone call. Patient difficult to understand. Wanting \"patience\". Continue support prn patient and family.
12/09/21 1247   Encounter Summary   Services provided to: Patient not available   Referral/Consult From: Other    Continue Visiting   (12/9 attempted follow up, Pt asleep)   Complexity of Encounter Low   Length of Encounter 15 minutes
12/13/21 0900   Vital Signs   Pulse 105   Resp 24   /60   MAP (mmHg) 81   Oxygen Therapy   SpO2 90 %   Pt resting in bed, vitals per doc flow. Assessment complete see doc flow.  on ICU bedside monitor. SPO2 91% on vapotherm 30L @ 65% with CSPO2 monitoring. Pt A&O x 4. PERRL. PICC WDL PIV x 1 WDL. Lee secured draining clear yellow urine. Pt repositioned for comfort. Of note pt SPO2 dropped to 72% when repositioned, placed on 40L @ 100% and slowly recovered. Slowly titrated back down to 30L @ 65% SPO2 90%. Will continue to closely monitor. Daughter to bedside.
12/13/21 1100   Vital Signs   Temp 99.4 °F (37.4 °C)   Temp Source Bladder   Pulse 86   Heart Rate Source Monitor   Resp 23   /67   MAP (mmHg) 86   Level of Consciousness Alert (0)   MEWS Score 2   Oxygen Therapy   SpO2 96 %   Pt reassessed. NSR 89 SPO2 94% Ob 30L @ 65%. Pt repositioned for comfort. Daughter to beside. Pt requesting to rest. ICU monitoring in place.
12/13/21 1700   Vital Signs   Temp 99.8 °F (37.7 °C)   Temp Source Bladder   Pulse 95   Heart Rate Source Monitor   Resp 25   /80   MAP (mmHg) 90   Level of Consciousness Alert (0)   MEWS Score 2   Oxygen Therapy   SpO2 97 %   pt reassessed sitting up in chair, . SPO2 95% on 40L @ 80%. Denies needs @ this time. ICU monitoring in place.
12/14/21 0800   Vital Signs   Temp 98.9 °F (37.2 °C)   Temp Source Bladder   Pulse 97   Heart Rate Source Monitor   Resp (!) 40   BP (!) 134/106   MAP (mmHg) 117   Level of Consciousness Alert (0)   MEWS Score 3   Oxygen Therapy   SpO2 92 %   O2 Device Heated high flow cannula  (vapotherm)   FiO2  80 %   O2 Flow Rate (L/min) 30 L/min   Pt resting in bed, vitals per doc flow. Assessment complete see doc flow. NSR 86 on ICU bedside monitor. SPO2 94% on vapotherm 30L @ 80% with CSPO2 monitoring. Pt A&O x 4. PERRL. PRICE dual lumen PICC WDL PIV x 1 WDL. Lee secured draining clear yellow urine. Pt repositioned for comfort. Will continue to closely monitor.
12/14/21 1126   Vital Signs   Temp 98.9 °F (37.2 °C)   Temp Source Bladder   Pulse 85   Heart Rate Source Monitor   Resp 24   /75   MAP (mmHg) 85   Level of Consciousness Alert (0)   MEWS Score 2   Oxygen Therapy   SpO2 96 %   pt reassessed NSR 83. SPO2 93% on 30L @ 75%. Pt bathed, tolerated well. ICU monitoring in place, daughter to bedside.
12/19/21 0200   Vital Signs   Temp 97.6 °F (36.4 °C)   Temp Source Oral   Pulse 80   Heart Rate Source Monitor   Resp 20   /65   BP Location Left upper arm   Patient Position Semi fowlers   Level of Consciousness Alert (0)   MEWS Score 1     Pt awake in bed. Pt denies any further needs at this time. Call light in reach and bed in lowest position. Will continue to monitor.
12/20/21 0845   Vital Signs   Temp 98 °F (36.7 °C)   Temp Source Oral   Pulse 83   Heart Rate Source Monitor   Resp 18   BP (!) 108/57   BP Location Left upper arm   Patient Position Semi fowlers   Level of Consciousness Alert (0)   MEWS Score 1   Oxygen Therapy   SpO2 95 %   Pulse Oximeter Device Mode Intermittent   Pulse Oximeter Device Location Finger   O2 Device High flow nasal cannula   Skin Assessment Clean, dry, & intact   O2 Flow Rate (L/min) 8 L/min     Shift assessment complete - see flow sheet, pt. Denies needs, call light within reach.
12/21/21 0745   Vital Signs   Temp 98 °F (36.7 °C)   Temp Source Oral   Pulse 82   Heart Rate Source Monitor   Resp 16   /69   BP Location Left upper arm   Patient Position Semi fowlers   Level of Consciousness Alert (0)   MEWS Score 1   Oxygen Therapy   SpO2 93 %   Pulse Oximeter Device Mode Continuous   Pulse Oximeter Device Location Finger   O2 Device High flow nasal cannula   Skin Assessment Clean, dry, & intact   Skin Protection for O2 Device Yes   Orientation Bilateral   Location Ear   Intervention(s) Ear Cushion   O2 Flow Rate (L/min) 4 L/min     Shift assessment complete - see flow sheet, pt. States she is experiencing right sided breast & right back pain states on a scale 0-10 it is a 7. Pt states when she breathes in it hurts no sob is experienced at rest, SOB with exertion. Pt is concerned for a possible blood clot. Pt. Denies any further needs, call light is within reach.
12/22/21 0945   Vital Signs   Temp 97.8 °F (36.6 °C)   Temp Source Oral   Pulse 87   Heart Rate Source Monitor   Resp 18   /61   BP Location Left upper arm   Patient Position Up in chair   Level of Consciousness Alert (0)   MEWS Score 1   Patient Currently in Pain Denies   Pain Assessment   Pain Assessment 0-10   Pain Level 0   Oxygen Therapy   SpO2 93 %   O2 Device High flow nasal cannula   O2 Flow Rate (L/min) 6 L/min   Assessment complete and morning medications administered.
2350: Pt on 85% on monitor in prone position, entered room 75%. Titrated HFNC to 15L/min, oxygen slow to respond. Placed non-rebreather over HFNC, O2 saturation increased to 92%. Charge nurse & clinical aware of increased oxygen demands. 0018: Attempted to removed non-rebreather,15 L/min via HFNC remains in place. Patient quickly desat to 75%. Elevated head of bed. Placed non-rebreather. 0023: Called RT. RT aware of increased oxygen demands & interventions to increase oxygen saturation. MD notified.         12/01/21 2350 12/01/21 2357 12/02/21 0018   Vital Signs   Resp 22  --   --    Patient Position Prone  --   --    Oxygen Therapy   SpO2 (!) 85 % 95 % (!) 75 %   O2 Device High flow nasal cannula Other (Comment) High flow nasal cannula   O2 Flow Rate (L/min) 12 L/min 15 L/min 15 L/min      12/02/21 0025   Vital Signs   Resp  --    Patient Position  --    Oxygen Therapy   SpO2 92 %   O2 Device Other (Comment)  (HFNC & non-rebreather mask at 15L/min each.)   O2 Flow Rate (L/min) 15 L/min
7127:  Notified MD concern for hypoxia with minimum exertion with activity. Currently on 40 L/min via vapotherm. Patient shift body from side lying to prone, immediately desated to 75%. Patient has desated to mid 70s using the bed pan. Charge nurse & clinical aware.      1027: new orders for Lorraine yoo, transfer to ICU bed 5.    0645: transferred patient to bed 5 in ICU.       12/02/21 0600 12/02/21 0610   Oxygen Therapy   SpO2 (!) 75 % 96 %   O2 Device Heated high flow cannula Heated high flow cannula   FiO2  100 % 100 %   O2 Flow Rate (L/min) 40 L/min 40 L/min
Non-rebreather off. Patient in prone position. Vapotherm remains in place. Patient maintaining oxygen saturation >95%.         12/02/21 0240   Oxygen Therapy   SpO2 97 %   O2 Device Heated high flow cannula   FiO2  100 %   O2 Flow Rate (L/min) 40 L/min
Patient removed bipap, encouraged her to keep it on, patient refuses to wear the BIPAP, heated high flow placed with fio2 100% with 40 liters. Resp notified.
Pt A/Ox4. Pt unlabored, respirations regular, shallow, even. INMAN. Pt frequently coughing, prn Robitussin given. 12 L/min via HFNC given. Shift assessment complete. See flowsheet. PM meds given. See MAR. Assisted pt to bedside commode, oxygen desated, encouraged deep breathing. Increased oxygen to 15 L/mins to recover, titrated down to 12L/min. Pt maintaining oxygen at 93%. Pt requested spiritual services, consult placed. Denies further needs at this time. Bed in low position. Call light within reach.         12/01/21 1923 12/01/21 2230 12/01/21 2255   Vital Signs   Temp 97.8 °F (36.6 °C)  --   --    Temp Source Oral  --   --    Pulse 92  --   --    Heart Rate Source Monitor  --   --    Resp 18  --   --    BP (!) 152/82  --   --    BP Location Right upper arm  --   --    Patient Position Prone  --   --    Level of Consciousness Alert (0) Alert (0)  --    MEWS Score 1  --   --    Oxygen Therapy   SpO2 90 %  --  (!) 75 %  (desat when up to bedside commode)   O2 Device High flow nasal cannula  --  High flow nasal cannula   O2 Flow Rate (L/min) 12 L/min  --  12 L/min      12/01/21 2300 12/01/21 2302   Vital Signs   Temp  --   --    Temp Source  --   --    Pulse 90 83   Heart Rate Source  --   --    Resp  --   --    BP  --   --    BP Location  --   --    Patient Position  --   --    Level of Consciousness  --   --    MEWS Score  --   --    Oxygen Therapy   SpO2 92 % 93 %   O2 Device High flow nasal cannula High flow nasal cannula   O2 Flow Rate (L/min) 15 L/min 12 L/min
Pt appears to be resting comfortably. VSS. Per pt, no new needs at this time.         12/17/21 0309   Vital Signs   Temp 98.1 °F (36.7 °C)   Temp Source Oral   Pulse 81   Heart Rate Source Monitor   Resp 18   /79   BP Location Left upper arm   Patient Position Semi fowlers   Oxygen Therapy   SpO2 96 %   O2 Device Heated high flow cannula   FiO2  65 %   O2 Flow Rate (L/min) 35 L/min   Height and Weight   Weight 137 lb 9.6 oz (62.4 kg)   BMI (Calculated) 23.7
Pt appears to be resting comfortably. VSS. Per pt, no new needs at this time.       12/16/21 0334   Vital Signs   Temp 98 °F (36.7 °C)   Temp Source Axillary   Pulse 78   Heart Rate Source Monitor   Resp 25   /65   BP Location Right lower arm   Patient Position Semi fowlers   Oxygen Therapy   SpO2 95 %   O2 Device Heated high flow cannula   FiO2  60 %   O2 Flow Rate (L/min) 35 L/min   Height and Weight   Weight 141 lb 3.2 oz (64 kg)   BMI (Calculated) 24.3
Responded to request for  visit and ACP conversation/LW conversation with daughters Gaston. Melissa and Giulia are primary decision makers per [employer], and patient has expressed that she chooses them to be her decision makers jointly if she were unable to make healthcare decisions. Family inquired to nurse about Living Will. I reviewed the document with Jose Ribrea and gave them the document to review and share with their mom. Spiritual Care is available to assist with completion if patient chooses to complete. Daughters' primary concern was that patient's wishes be honored: currently patient is stating that she does not want to be intubated, daughters agreed together at the time of our meeting that 'whatever she wants we will support, even if she changes her mind. We just want what she wants.'    I validated frustrations of daughters they experienced, initiated prayer, prayed as requested that Shawnee Severance be strengthened and improve. Melissa and Giulia both shared tears as they talked about their father who passed \"4 Decembers ago. Lex Awkward Lex Awkward \"    Family thanked me for my visit, offered spiritual care at any time for support. Brie Martinez  [phone]       12/03/21 1446   Encounter Summary   Services provided to: Family  (Dtrs Giulia and [employer] in waiting ICU)   Referral/Consult From: Nurse  (request for family support, ACP convo)   Support System Children; Family members; Friends/neighbors   Continue Visiting   (12/3: nurse ref, [employer] convo, prayer, values conversation)   Complexity of Encounter Moderate   Length of Encounter 45 minutes   Spiritual Assessment Completed Yes   Advance Care Planning Yes   Spiritual/Yazidi   Type Spiritual support   Assessment Approachable   Intervention Active listening; Prayer;  Facilitated family conference
Shift assessment complete. See flowsheet for full assessment. Pt given PRN melatonin per her request. Pt also requested to be put on bipap, RT notified. Pt denies any needs, call light within reach.       12/15/21 1920   Vital Signs   Temp 98.3 °F (36.8 °C)   Temp Source Oral   Pulse 87   Heart Rate Source Monitor   Resp 20   /67   BP Location Right lower arm   Patient Position Semi fowlers   Oxygen Therapy   SpO2 95 %   O2 Device High flow nasal cannula   FiO2  70 %   O2 Flow Rate (L/min) 35 L/min
ACP note)

## 2021-12-23 ENCOUNTER — TELEPHONE (OUTPATIENT)
Dept: INTERNAL MEDICINE CLINIC | Age: 71
End: 2021-12-23

## 2021-12-23 VITALS
DIASTOLIC BLOOD PRESSURE: 61 MMHG | HEART RATE: 82 BPM | SYSTOLIC BLOOD PRESSURE: 113 MMHG | RESPIRATION RATE: 16 BRPM | OXYGEN SATURATION: 97 % | WEIGHT: 137 LBS | TEMPERATURE: 98.3 F | BODY MASS INDEX: 23.39 KG/M2 | HEIGHT: 64 IN

## 2021-12-23 PROCEDURE — 99232 SBSQ HOSP IP/OBS MODERATE 35: CPT | Performed by: INTERNAL MEDICINE

## 2021-12-23 PROCEDURE — 2700000000 HC OXYGEN THERAPY PER DAY

## 2021-12-23 PROCEDURE — 6370000000 HC RX 637 (ALT 250 FOR IP): Performed by: INTERNAL MEDICINE

## 2021-12-23 PROCEDURE — 6360000002 HC RX W HCPCS: Performed by: INTERNAL MEDICINE

## 2021-12-23 PROCEDURE — 99239 HOSP IP/OBS DSCHRG MGMT >30: CPT | Performed by: INTERNAL MEDICINE

## 2021-12-23 PROCEDURE — 6370000000 HC RX 637 (ALT 250 FOR IP): Performed by: NURSE PRACTITIONER

## 2021-12-23 PROCEDURE — 94761 N-INVAS EAR/PLS OXIMETRY MLT: CPT

## 2021-12-23 PROCEDURE — 2580000003 HC RX 258: Performed by: INTERNAL MEDICINE

## 2021-12-23 RX ORDER — BENZONATATE 200 MG/1
200 CAPSULE ORAL 3 TIMES DAILY PRN
Qty: 20 CAPSULE | Refills: 0 | Status: SHIPPED | OUTPATIENT
Start: 2021-12-23 | End: 2021-12-30

## 2021-12-23 RX ORDER — QUETIAPINE FUMARATE 25 MG/1
12.5 TABLET, FILM COATED ORAL 2 TIMES DAILY
Qty: 60 TABLET | Refills: 0 | Status: SHIPPED | OUTPATIENT
Start: 2021-12-23

## 2021-12-23 RX ORDER — FAMOTIDINE 20 MG/1
20 TABLET, FILM COATED ORAL 2 TIMES DAILY
Qty: 60 TABLET | Refills: 0 | Status: SHIPPED | OUTPATIENT
Start: 2021-12-23 | End: 2022-02-08

## 2021-12-23 RX ORDER — GUAIFENESIN/DEXTROMETHORPHAN 100-10MG/5
5 SYRUP ORAL EVERY 4 HOURS PRN
Qty: 120 ML | Refills: 0 | Status: SHIPPED | OUTPATIENT
Start: 2021-12-23 | End: 2022-01-02

## 2021-12-23 RX ORDER — DEXAMETHASONE 1 MG
1 TABLET ORAL DAILY
Qty: 3 TABLET | Refills: 0 | Status: SHIPPED | OUTPATIENT
Start: 2021-12-24 | End: 2021-12-27

## 2021-12-23 RX ADMIN — DEXAMETHASONE 1 MG: 1 TABLET ORAL at 08:19

## 2021-12-23 RX ADMIN — ERGOCALCIFEROL 50000 UNITS: 1.25 CAPSULE ORAL at 09:17

## 2021-12-23 RX ADMIN — FAMOTIDINE 20 MG: 20 TABLET ORAL at 08:18

## 2021-12-23 RX ADMIN — SODIUM CHLORIDE, PRESERVATIVE FREE 10 ML: 5 INJECTION INTRAVENOUS at 08:20

## 2021-12-23 RX ADMIN — PSYLLIUM HUSK 1 PACKET: 3.4 POWDER ORAL at 08:19

## 2021-12-23 RX ADMIN — MULTIPLE VITAMINS W/ MINERALS TAB 1 TABLET: TAB at 08:18

## 2021-12-23 RX ADMIN — LEVOTHYROXINE SODIUM 50 MCG: 0.03 TABLET ORAL at 06:27

## 2021-12-23 RX ADMIN — LIOTHYRONINE SODIUM 5 MCG: 5 TABLET ORAL at 09:17

## 2021-12-23 RX ADMIN — QUETIAPINE FUMARATE 12.5 MG: 25 TABLET ORAL at 08:18

## 2021-12-23 RX ADMIN — ESCITALOPRAM OXALATE 5 MG: 10 TABLET ORAL at 08:19

## 2021-12-23 RX ADMIN — ENOXAPARIN SODIUM 30 MG: 100 INJECTION SUBCUTANEOUS at 08:19

## 2021-12-23 NOTE — PROGRESS NOTES
RA Sp02 78% at rest.   4L Sp02 92 on exertion. Sp02 92 on 4L of 02 per NC with ambulation. Patient had coughing fit after sitting dropping to 86 requiring 5L to recover.

## 2021-12-23 NOTE — CARE COORDINATION
DISCHARGE ORDER  Date/Time 2021 11:18 AM  Completed by: Gil Narvaez RN, Case Management    Patient Name: Kinga Colvin      : 1950  Admitting Diagnosis: Acute respiratory disease due to COVID-19 virus [U07.1, J06.9]  Acute respiratory failure due to COVID-19 ([address] [U07.1, J96.00]      Admit order Date and Status: IP 2021  (verify MD's last order for status of admission)      Noted discharge order. If applicable PT/OT recommendation at Discharge:     Discharge Recommendations: ARU/IRF (inpatient rehab facility)   DME needs for discharge: defer to facility    Comment: Pt adamant about discharging to home (daughters house). Will need RW. Confirmed discharge plan with daughterMiguel Robert  Discharge Plan: Pt will DC home with her daughter to provide  supervision and assistance. Referral to  [facility] (SN/PT/OT) Orders and JENNY/AVS in Epic. Daughter has purchased a new RW and SC. New home O2 w/ [employer] in process. Await confirmation of delivery. Daughter will be here later today to pick pt up and would like to be present when O2 equipment is delivered to help with understanding how to use the tank. ADDENDUM 15:35   Home O2 to be supplied by [employer]. E-Tank to be delivered to pt room by [facility] RN (Lillie [phone], prior to DC)    Reviewed chart. Role of discharge planner explained and patient verbalized understanding. Discharge order is noted. Has Home O2 in place on admit:  Yes- new set up  Informed of need to bring portable home O2 tank on day of discharge for nursing to connect prior to leaving:   Yes  Verbalized agreement/Understanding:   Yes  Pt is being d/c'd to home today. Pt's O2 sats are 97% on 4 liters. Discharge timeout done with [employer] [facility] . All discharge needs and concerns addressed.

## 2021-12-23 NOTE — DISCHARGE INSTR - COC
Continuity of Care Form    Patient Name: Travis Soares   :  1950  MRN:  [id number]    Admit date:  2021  Discharge date:  ***    Code Status Order: DNR-CCA   Advance Directives:      Admitting Physician:  Leah Strickland MD  PCP: Leah Strickland MD    Discharging Nurse: [facility] Unit/Room#: /2585-54  Discharging Unit Phone Number: ***    Emergency Contact:   Extended Emergency Contact Information  Primary Emergency Contact: Julienne Poole  Home Phone: [phone]  Mobile Phone: [phone]  Relation: Child  Secondary Emergency Contact: Chalino Eduardo  Mobile Phone: [phone]  Relation: Child    Past Surgical History:  Past Surgical History:   Procedure Laterality Date    ELBOW SURGERY      HYSTERECTOMY      SHOULDER SURGERY      Right       Immunization History:   Immunization History   Administered Date(s) Administered    Influenza Virus Vaccine 2009, 10/23/2017, 2019    Influenza, Triv, inactivated, subunit, adjuvanted, IM (Fluad 65 yrs and older) 2019    Pneumococcal Conjugate 13-valent (José Antonio Beech) 2017       Active Problems:  Patient Active Problem List   Diagnosis Code    Depression F32. A    Thyroid disorder E07.9    Vitamin D deficiency E55.9    Ischial bursitis M70.70    Osteopenia M85.80    IBS (irritable bowel syndrome) K58.9    Colon polyp K63.5    OAB (overactive bladder) N32.81    Stress incontinence N39.3    Acute respiratory disease due to COVID-19 virus U07.1, J06.9    Hypokalemia E87.6    Elevated LFTs R79.89    Leukopenia D72.819    Acute respiratory failure with hypoxia (HCC) J96.01    Pneumonia due to COVID-19 virus U07.1, J12.82    Acquired hypothyroidism E03.9    Transaminitis R74.01    Electrolyte disorder E87.8    ARDS (adult respiratory distress syndrome) (HCC) J80    Agitation R45.1    Acute hypoxemic respiratory failure (HCC) J96.01    Fever R50.9       Isolation/Infection:   Isolation            Droplet Plus Patient Infection Status       Infection Onset Added Last Indicated Last Indicated By Review Planned Expiration Resolved Resolved By    None active    Resolved    C-diff Rule Out 06/09/21 06/09/21 06/09/21 Gastrointestinal Panel, Molecular (Ordered)   06/09/21 Rule-Out Test Resulted            Nurse Assessment:  Last Vital Signs: /61   Pulse 82   Temp 98.3 °F (36.8 °C) (Oral)   Resp 16   Ht 5' 4\" (1.626 m)   Wt 137 lb (62.1 kg)   SpO2 97%   BMI 23.52 kg/m²     Last documented pain score (0-10 scale): Pain Level: 0  Last Weight:   Wt Readings from Last 1 Encounters:   12/23/21 137 lb (62.1 kg)     Mental Status:  {IP PT MENTAL STATUS:20030}    IV Access:  { JENNY IV ACCESS:557136813}    Nursing Mobility/ADLs:  Walking   {CHP DME ZNGX:683401495}  Transfer  {CHP DME ELHD:472381142}  Bathing  {CHP DME LSDQ:856036807}  Dressing  {CHP DME NFYH:378366506}  Toileting  {CHP DME SSNV:098840691}  Feeding  {P DME GDOT:491379781}  Med Admin  {P DME PMAK:758840983}  Med Delivery   { JENNY MED Delivery:945189868}    Wound Care Documentation and Therapy:        Elimination:  Continence: Bowel: {YES / CN:28916}  Bladder: {YES / UE:32471}  Urinary Catheter: {Urinary Catheter:917799567}   Colostomy/Ileostomy/Ileal Conduit: {YES / KE:54924}       Date of Last BM: ***    Intake/Output Summary (Last 24 hours) at 12/23/2021 1220  Last data filed at 12/23/2021 1159  Gross per 24 hour   Intake 300 ml   Output 2150 ml   Net -1850 ml     I/O last 3 completed shifts:   In: 360 [P.O.:360]  Out: 2050 [Urine:2050]    Safety Concerns:     [address] Concerns:467915099}    Impairments/Disabilities:      [address] Impairments/Disabilities:624405268}    Nutrition Therapy:  Current Nutrition Therapy:   [address] Diet List:078521074}    Routes of Feeding: {CHP DME Other Feedings:581439139}  Liquids: {Slp liquid thickness:69211}  Daily Fluid Restriction: {CHP DME Yes amt example:786028822}  Last Modified Barium Swallow with Video (Video Swallowing Test): {Done Not Done MVXF:148677788}    Treatments at the Time of Hospital Discharge:   Respiratory Treatments: ***  Oxygen Therapy:  {Therapy; copd oxygen:45548}  Ventilator:    { CC Vent UJGQ:006140699}    Rehab Therapies: {THERAPEUTIC INTERVENTION:5680943964}  Weight Bearing Status/Restrictions: { CC Weight Bearin}  Other Medical Equipment (for information only, NOT a DME order):  {EQUIPMENT:705911043}  Other Treatments: ***    Patient's personal belongings (please select all that are sent with patient):  {CHP DME Belongings:915751724}    RN SIGNATURE:  {Esignature:371873721}    CASE MANAGEMENT/SOCIAL WORK SECTION    Inpatient Status Date: ***    Readmission Risk Assessment Score:  Readmission Risk              Risk of Unplanned Readmission:  14           Discharging to Facility/ Agency   Name:   Address:  Phone:  Fax:    Dialysis Facility (if applicable)   Name:  Address:  Dialysis Schedule:  Phone:  Fax:    / signature: {Esignature:299298179}    PHYSICIAN SECTION    Prognosis: Good    Condition at Discharge: Stable    Rehab Potential (if transferring to Rehab): Good    Recommended Labs or Other Treatments After Discharge:     Physician Certification: I certify the above information and transfer of Corbin Dia  is necessary for the continuing treatment of the diagnosis listed and that she requires Home Care for less 30 days.      Update Admission H&P: No change in H&P    PHYSICIAN SIGNATURE:  Electronically signed by Susan Schaffer MD on 21 at 12:21 PM EST

## 2021-12-23 NOTE — TELEPHONE ENCOUNTER
----- Message from Sigrid James sent at 12/23/2021  4:29 PM EST -----  Contact: [phone] Jeri from [facility] per DR POLLARD  ----- Message -----  From: Jaison Briscoe  Sent: 12/23/2021   3:37 PM EST  To: MD Jeri Cisneros from [facility] called and was requesting verbal orders for skilled nursing, PT, and OT. She will be discharged from [facility] today.     Thank you

## 2021-12-23 NOTE — PROGRESS NOTES
Patient being discharged. Paper signed that she received discharge papers and signed prescriptions. She is now waiting for daughters to arrive to pack her personal items and bring her home.

## 2021-12-23 NOTE — PROGRESS NOTES
Shift assessment complete, see flowsheets. Medications given, see MAR. Pt assisted to bedside commode on 6L, SpO2 dropped to 60%, placed on 15L and recovered quickly. Pt assisted to prone without complication. Denies other needs at this time. Pt on 6L. Melatonin given per pt request. Call light in easy reach, bedside table in easy reach, and bed in lowest position.   Chuck Philip RN

## 2021-12-23 NOTE — PROGRESS NOTES
Pulmonary & Critical Care Medicine Progress Note  Hospital Day: 23   CC: COVID-19    Subjective:   On 3-4 L O2. Exertional desats with quick recovery  No pleuritic pain  Feels improved today - able to take deeper breaths & feels easier to breathe    Vascular lines: IV: PICC 12/3/21    Vitals:  Blood pressure 131/70, pulse 77, temperature 97.9 °F (36.6 °C), temperature source Oral, resp. rate 16, height 5' 4\" (1.626 m), weight 137 lb (62.1 kg), SpO2 91 %, not currently breastfeeding. On 4 L  Constitutional:  No acute distress. HENT:  Oropharynx is clear and moist.   Neck: No tracheal deviation present. Cardiovascular: Normal heart sounds. No lower extremity edema. Pulmonary/Chest: No wheezes. No rhonchi. + rales. No decreased breath sounds. No accessory muscle usage or stridor. Musculoskeletal: No cyanosis. No clubbing. Skin: Skin is warm and dry. Psychiatric: Normal mood and affect. Neurologic: speech fluent, alert and oriented, strength symmetric     Scheduled Meds:   dexamethasone  1 mg Oral Daily    influenza virus vaccine  0.5 mL IntraMUSCular Prior to discharge    escitalopram  5 mg Oral Daily    QUEtiapine  12.5 mg Oral BID    psyllium  1 packet Oral Daily    vitamin D  50,000 Units Oral Weekly    famotidine  20 mg Oral BID    sodium chloride flush  5-40 mL IntraVENous 2 times per day    enoxaparin  30 mg SubCUTAneous BID    levothyroxine  50 mcg Oral Daily    liothyronine  5 mcg Oral Daily    therapeutic multivitamin-minerals  1 tablet Oral Daily     Data:  CBC:   No results for input(s): WBC, HGB, HCT, MCV, PLT in the last 72 hours. BMP:   No results for input(s): NA, K, CL, CO2, PHOS, BUN, CREATININE in the last 72 hours. Invalid input(s): CA  LIVER PROFILE:   No results for input(s): AST, ALT, LIPASE, BILIDIR, BILITOT, ALKPHOS in the last 72 hours. Invalid input(s):   AMYLASE,  ALB    Microbiology:  12/1/2021 BC NG  12/10/2021 Resp NG  12/10/2021 BC NG   12/10/2021 UC NG    Imaging:  CTPA 12/1/2021  1. No evidence of pulmonary embolism. 2. Extensive bilateral multifocal airspace disease compatible with multifocal   pneumonia, specifically COVID pneumonia. CXR 12/18/2021   1. Stable appropriate position of right arm PICC. 2. No significant change in appearance of multifocal airspace disease, most   consistent with a combination of multifocal pneumonia and superimposed   pulmonary edema. CTPA 12/21/2021 personally reviewed  Pulmonary Arteries: Pulmonary arteries are adequately opacified for evaluation.  No evidence of intraluminal filling defect to suggest pulmonary embolism.  Main pulmonary artery is normal in caliber. Mediastinum: There is mild diffuse mediastinal adenopathy which is likely reactive. Lungs/pleura: There is extensive diffuse bilateral ground-glass type airspace disease.       Impression   Findings consistent with extensive bilateral pneumonia     ASSESSMENT:  Acute hypoxemic respiratory failure, improving  COVID-19 viral pneumonia in an unvaccinated patient  ARDS  Pleurisy, ruled out PE - resolved  Mediastinal lymphadenopathy       PLAN:  COVID-19 isolation, droplet plus  Supplemental oxygen to maintain SaO2 >92%; wean as tolerated    Albuterol MDI for bronchospasms  Decadron D#23, down to 1 mg qd  Completed 5 days Zosyn, 5 days Levaquin  S/P Tocilizumab x1 12/3/21; s/p 2 doses of Baricitinib  Inhaled bronchodilators only as needed, MDI preferred  PT/OT  DVT prophylaxis: Lovenox 30 BID  CODE: DNR-CCA  Follow up with PCP in 1-2 weeks. See me CMT in 6 weeks with repeat CXR to document clearing of COVID19 infiltrates, tentative plan for CT Chest in 3 months for Mediastinal lymphadenopathy.

## 2021-12-23 NOTE — DISCHARGE SUMMARY
disease     Possible sec bacterial infection  - risk for gram neg infection   - completed levaquin for 5 days, later on zosyn for 5 days - off now   - cx remain neg      Severe anxiety   -Was requiring Precedex drip.  now changed to seroquel bid  Resumed lexapro     Acquired hypothyroidism.    Continue home medication.     Weakness and debility  -Continued PT OT     Procedures (Please Review Full Report for Details)  None     Consults    Pulmonology       Physical Exam at Discharge:    /61   Pulse 82   Temp 98.3 °F (36.8 °C) (Oral)   Resp 16   Ht 5' 4\" (1.626 m)   Wt 137 lb (62.1 kg)   SpO2 97%   BMI 23.52 kg/m²     General: thin  appearing elderly female    Awake, alert and oriented. No distress  Mucous Membranes:  Pink , anicteric  Neck: No JVD, no carotid bruit, no thyromegaly  Chest: Diminished breath sounds, improving air entry. no wheezes rales or rhonchi. Right lateral chest wall tenderness present  Cardiovascular:  RRR S1S2 heard, no murmurs or gallops  Abdomen:  Soft, undistended, non tender, no organomegaly, BS present  Extremities: No edema or cyanosis. Distal pulses well felt  Neurological : grossly normal with improving gen weakness      CULTURES  Blood: NGTD  Sputum: NRF      RADIOLOGY  CTA PULMONARY W CONTRAST   Final Result   Findings consistent with extensive bilateral pneumonia      RECOMMENDATIONS:   Unavailable         XR CHEST PORTABLE   Final Result   1. Stable appropriate position of right arm PICC. 2. No significant change in appearance of multifocal airspace disease, most   consistent with a combination of multifocal pneumonia and superimposed   pulmonary edema. XR CHEST PORTABLE   Final Result   Mild interval increase in airspace opacities involving the right mid to lower   lung field and left mid to upper lung field. Mild decrease in the airspace   opacities in the left mid to lower lung field. This is when compared to the   prior study performed 12/06/2021. XR CHEST PORTABLE   Final Result   Moderate diffuse airspace disease compatible with multifocal pneumonia with   asymmetric involvement and consolidation left lower lobe. This could   represent COVID pneumonia with superimposed edema. IR PICC WO SQ PORT/PUMP > 5 YEARS   Final Result   Successful placement of PICC line. CT CHEST PULMONARY EMBOLISM W CONTRAST   Final Result   No evidence of pulmonary embolism. Extensive bilateral multifocal airspace disease compatible with multifocal   pneumonia, specifically COVID pneumonia. XR CHEST PORTABLE   Final Result   Multifocal peripherally located pulmonary opacities are seen, likely related   to COVID-19 pneumonia. Discharge Medications     Medication List      START taking these medications     famotidine 20 MG tablet; Commonly known as: PEPCID; Take 1 tablet by   mouth 2 times daily   guaiFENesin-dextromethorphan 100-10 MG/5ML syrup; Commonly known as:   ROBITUSSIN DM; Take 5 mLs by mouth every 4 hours as needed for Cough   QUEtiapine 25 MG tablet; Commonly known as: SEROQUEL; Take 0.5 tablets   by mouth 2 times daily     CHANGE how you take these medications     benzonatate 200 MG capsule; Commonly known as: TESSALON; Take 1 capsule   by mouth 3 times daily as needed for Cough; What changed: medication   strength, how much to take   dexamethasone 1 MG tablet; Commonly known as: DECADRON; Take 1 tablet by   mouth daily for 3 days; Start taking on: December 24, 2021; What changed:   medication strength, how much to take, when to take this   liothyronine 5 MCG tablet; Commonly known as: CYTOMEL; Take 1 tablet by   mouth once daily; What changed: Another medication with the same name was   removed. Continue taking this medication, and follow the directions you   see here. CONTINUE taking these medications     albuterol sulfate  (90 Base) MCG/ACT inhaler; Commonly known as:   Ventolin HFA;  Inhale 2 puffs into the lungs every 6 hours as needed for   Wheezing   CENTRUM SILVER PO   fluticasone 50 MCG/ACT nasal spray; Commonly known as: FLONASE; 2 sprays   by Each Nostril route daily   levothyroxine 50 MCG tablet; Commonly known as: SYNTHROID; Take 1 tablet   by mouth once daily   Lexapro 5 MG tablet; Generic drug: escitalopram   Vitamin D 1000 units Caps capsule; Commonly known as: CHOLECALCIFEROL     STOP taking these medications     dicyclomine 10 MG capsule; Commonly known as: Bentyl         Discharged in stable condition to home. D/C home with C. home O2 arranged   Total time 35 minutes. > 50%  dominated by counseling and coordination of care. Follow Up:   Follow up with PCP in 1 week         Nuvia Boone MD

## 2021-12-26 ENCOUNTER — CARE COORDINATION (OUTPATIENT)
Dept: CASE MANAGEMENT | Age: 71
End: 2021-12-26

## 2021-12-26 NOTE — CARE COORDINATION
[address] Transitions Initial Follow Up Call    Call within 2 business days of discharge: Yes    Patient: Harpreet Heredia Patient : 1950   MRN: [id number]  Reason for Admission: COVID PNA, acute hypoxic resp failure, ARDS, possible secondary bacterial infection, severe anxiety, acquired hypothyroidism, weakness and debility  Discharge Date: 21 RARS: Readmission Risk Score: 9 ( )      Last Discharge [facility]       Complaint Diagnosis Description Type Department Provider    21 Shortness of Breath Acute respiratory disease due to COVID-19 virus . .. ED to Hosp-Admission (Discharged) (ADMITTED) [facility] PCU Russel Alvarez MD; Sydney Jain. .. Spoke with: Harpreet Heredia (patient)    Facility: [facility]    Non-face-to-face services provided:  Obtained and reviewed discharge summary and/or continuity of care documents    Was this an external facility discharge? No Discharge Facility: NA    Challenges to be reviewed by the provider   Additional needs identified to be addressed with provider No       Method of communication with provider : none    Advance Care Planning:   Does patient have an Advance Directive:  decision maker updated. Was this a readmission? No  Patient stated reason for admission: SOB  Patients top risk factors for readmission: medical condition-     Care Transition Nurse (CTN) contacted the patient by telephone to perform post hospital discharge assessment. Verified name and  with patient as identifiers. Provided introduction to self, and explanation of the CTN role. CTN reviewed discharge instructions, medical action plan and red flags with patient who verbalized understanding. Patient given an opportunity to ask questions and does not have any further questions or concerns at this time. Were discharge instructions available to patient? Yes. Reviewed appropriate site of care based on symptoms and resources available to patient including: PCP.  The patient agrees to contact the PCP office for questions related to their healthcare. Medication reconciliation was performed with patient, who verbalizes understanding of administration of home medications. COVID Risk Education    Patient was given an opportunity to verbalize any questions and concerns and agrees to contact CTN or health care provider for questions related to their healthcare. Was patient discharged with a pulse oximeter? Yes Discussed and confirmed pulse oximeter discharge instructions and when to notify provider or seek emergency care. Still feeling weak and \"oxygen is an issue\". She confirms [employer] delivered the oxygen. With any exertion her SaO2 drops to mid 70s. She recovers quickly to 90s with DB. Discussed energy conservation techniques. She has been turning concentrator to 6lpm with exertion. She is 92% at 4lpm during our conversation. Only taking the Seroquel HS, not BID. Helps her sleep better. Sleeping 9pm-7am. Tolerating PO. She did not want to review medications fully and states her daughter is waiting to talk with the nurse and new PCP. She does not plan to continue with Dr Zachary Vallejo as PCP. Plans to establish as a new patient with Dr Guillermo Toledo in [place]. Has strong family support with 24/7 assistance. Denies needs at this time. Expects [facility] [place]. nurse today. CTN provided contact information for future needs. Plan for follow-up call in 5-7 days based on severity of symptoms and risk factors.     Care Transitions 24 Hour Call    Do you have any ongoing symptoms?: Yes  Patient-reported symptoms: Weakness  Do you have a copy of your discharge instructions?: Yes  Do you have all of your prescriptions and are they filled?: Yes  Have you been contacted by a [facility] Pharmacist?: No  Were you discharged with any Home Care or Post Acute Services: Yes  Post Acute Services: Home Health (Comment: [facility] / [facility] O2)  Care Transitions Interventions         Follow Up  No future appointments.     Russel Joyner RN

## 2021-12-27 ENCOUNTER — TELEPHONE (OUTPATIENT)
Dept: PULMONOLOGY | Age: 71
End: 2021-12-27

## 2021-12-27 DIAGNOSIS — U09.9 POST COVID-19 CONDITION, UNSPECIFIED: Primary | ICD-10-CM

## 2021-12-27 NOTE — TELEPHONE ENCOUNTER
Spoke with pt whom states that her daughter Marisabel Sessions schedules appts for pt.   Will try to reach Giulia at [phone] at a later time per pt request.

## 2021-12-27 NOTE — TELEPHONE ENCOUNTER
· Follow up with PCP in 1-2 weeks. See me CMT in 6 weeks with repeat CXR to document clearing of COVID19 infiltrates, tentative plan for CT Chest in 3 months for Mediastinal lymphadenopathy.

## 2021-12-29 ENCOUNTER — CARE COORDINATION (OUTPATIENT)
Dept: CASE MANAGEMENT | Age: 71
End: 2021-12-29

## 2021-12-29 NOTE — CARE COORDINATION
[address] Transitions Follow Up Call    2021    Patient: Michael Garcia  Patient : 1950   MRN: [id number]  Reason for Admission: COVID PNA, acute hypoxic resp failure, ARDS, possible secondary bacterial infection, severe anxiety, acquired hypothyroidism, weakness and debility  Discharge Date: 21 RARS: Readmission Risk Score: 9 ( )       Unable to reach patient by phone at this time. Message left including CTN contact info for return call. Follow Up  No future appointments.     Jasmeet Dillard RN

## 2021-12-30 NOTE — TELEPHONE ENCOUNTER
Assessment done per SGNA guidelines. Breathing nonlabored. Skin warm and dry.     Spoke with Sarabjit Route, scheduled pt for appt 2/8/22 with same day CXR. CXR order pending.

## 2022-01-03 ENCOUNTER — CARE COORDINATION (OUTPATIENT)
Dept: CASE MANAGEMENT | Age: 72
End: 2022-01-03

## 2022-01-03 NOTE — CARE COORDINATION
Patient contacted regarding COVID-19 diagnosis. Care Transition Nurse contacted the patient by telephone to perform follow-up assessment. Symptoms reviewed with patient who verbalized the following symptoms: fatigue, shortness of breath, no new symptoms and no worsening symptoms. Due to no new or worsening symptoms encounter was not routed to provider for escalation. Patient was given an opportunity to verbalize any questions and concerns and agrees to contact the CTN or health care provider for questions related to their healthcare. Was patient discharged with a pulse oximeter? Yes CTN reviewed pulse oximeter instructions and when to notify provider or seek emergency care. CTN received a voice mail from patient requesting a SN visit today for assessment. Message left with [facility]. sergey Frazier with patient request.     Outreach to patient at this time. States she is sleeping well, eating well. Very fatigued and weak still. Napping daily. Up with walker but still having INMAN. Able to get self to bathroom with use of walker. Has vv with new PCP Dr Rekha Leon this afternoon. She is still staying at her daughter's home. Updated her that CTN left a message with [facility]. sergey Nicholaser with her request for SN visit. Noted that SN does not usually visit same day she sees PCP but since vv unknown to CTN the rules are. She voices understanding. CTN provided contact information for future reference. Plan for follow-up call in 7-14 days based on severity of symptoms and risk factors.     Annette Borrego RN  Care Transition Nurse  [phone] mobile    Future Appointments   Date Time Provider Rogers Barker   2/8/2022  1:00 PM MARIA R Acharya [address] [place] [address]

## 2022-01-04 ENCOUNTER — CARE COORDINATION (OUTPATIENT)
Dept: CASE MANAGEMENT | Age: 72
End: 2022-01-04

## 2022-01-04 ENCOUNTER — TELEPHONE (OUTPATIENT)
Dept: PULMONOLOGY | Age: 72
End: 2022-01-04

## 2022-01-04 NOTE — TELEPHONE ENCOUNTER
Patient's daughter Lu Villa called (on [employer]) asking which COVID variant patient had. Please advise. Patient has f/u 2/8/22. ASSESSMENT: 12/22/21 Hospital Note  · Acute hypoxemic respiratory failure, improving  · COVID-19 viral pneumonia in an unvaccinated patient  · ARDS  · Pleurisy, ruled out PE - resolved      PLAN:  · COVID-19 isolation, droplet plus  · Supplemental oxygen to maintain SaO2 >92%; wean as tolerated    · Albuterol MDI for bronchospasms  · Decadron D#22, now down to 1 mg qd  · Completed 5 days Zosyn, 5 days Levaquin  · S/P Tocilizumab x1 12/3/21; s/p 2 doses of Baricitinib  · Inhaled bronchodilators only as needed, MDI preferred  · PT/OT  · OK for toradol 15 mg PRN pleurisy   · DVT prophylaxis: Lovenox 30 BID  · CODE: DNR-CCA  · Follow up with PCP in 1-2 weeks.  I recommend repeat chest imaging in 6 weeks to document clearing of COVID19 infiltrates

## 2022-01-04 NOTE — TELEPHONE ENCOUNTER
Eric Damon. Hospitals do not test for individual COVID19 variants. Serotyping is done by the state on random samples to test what variants are circulating and is it not linked back to individuals. Hope this helps.

## 2022-01-04 NOTE — CARE COORDINATION
Received incoming call from patient. States she feels she has pleurisy. She contacted her PCP and was instructed to use Tylenol. Still waiting for an appointment with the SN with [facility]. She states she did receive a call from Eveline Parks at hospitals that he was working on it. Her concern is that no one has listened to her lungs. States [facility] PT visits tomorrow. CTN will outreach to Diana Dave at hospitals again. She is in agreement with plan. Outreach to Eveline Parks. Message left.      Jody Tan RN  Care Transition Nurse  [phone] mobile

## 2022-01-11 ENCOUNTER — CARE COORDINATION (OUTPATIENT)
Dept: CASE MANAGEMENT | Age: 72
End: 2022-01-11

## 2022-01-11 NOTE — CARE COORDINATION
Patient contacted regarding COVID-19 diagnosis. Care Transition Nurse contacted the patient by telephone to perform follow-up assessment. Symptoms reviewed with patient who verbalized the following symptoms: fatigue, no new symptoms and no worsening symptoms. Due to no new or worsening symptoms encounter was not routed to provider for escalation. Patient was given an opportunity to verbalize any questions and concerns and agrees to contact the CTN or health care provider for questions related to their healthcare. Was out and about today and now exhausted. Resting now. Pleurisy gone. Feels well. Denies needs at this time. CTN provided contact information for future reference. No further follow-up call identified based on severity of symptoms and risk factors.     Karla Weber RN  Care Transition Nurse  [phone] mobile    Future Appointments   Date Time Provider Rogers Barker   2/8/2022  1:00 PM MARIA R Narvaez [address] [place] [address]

## 2022-01-13 ENCOUNTER — TELEPHONE (OUTPATIENT)
Dept: PULMONOLOGY | Age: 72
End: 2022-01-13

## 2022-01-13 DIAGNOSIS — U09.9 POST COVID-19 CONDITION, UNSPECIFIED: Primary | ICD-10-CM

## 2022-01-13 NOTE — TELEPHONE ENCOUNTER
(patient aware will be address in morning)      Patient called states her throat and upper lung feel irriated. Do you have the following symptoms? Shortness of Breath  Yes some  Wheezing  no  Cough  yes  Cough Characteristics:  Productive    no  Sputum Color    no  Hemoptysis   no  Consistency of sputum   no     Fever:    98.9    Temp:no  Chills/Sweats:  no  What other symptoms are you having?:  Throat feels irritated. How long have you had these symptoms? 4-6 hrs ago     [facility]    Have you been vaccinated for covid? No  Have you received a booster vaccine? No          Review medications and allergies: Allergies? Allergies   Allergen Reactions    Forteo [Parathyroid Hormone (Recomb)] Shortness Of Breath, Rash and Other (See Comments)     Boils under arms and upper thigh    Atarax [Hydroxyzine]     Codeine Nausea Only    Vicodin [Hydrocodone-Acetaminophen]              Currently on Antibiotics? (Drug/Dose/Frequency and how long on?) no        Systemic Steroids? (Drug/Dose/Frequency and how long on?) no       Last OV 12/23/21 with Dr. Maribel Harvey [facility] note  (pull in last visit note assessment/plan)   ASSESSMENT:  · Acute hypoxemic respiratory failure, improving  · COVID-19 viral pneumonia in an unvaccinated patient  · ARDS  · Pleurisy, ruled out PE - resolved  · Mediastinal lymphadenopathy       PLAN:  · COVID-19 isolation, droplet plus  · Supplemental oxygen to maintain SaO2 >92%; wean as tolerated    · Albuterol MDI for bronchospasms  · Decadron D#23, down to 1 mg qd  · Completed 5 days Zosyn, 5 days Levaquin  · S/P Tocilizumab x1 12/3/21; s/p 2 doses of Baricitinib  · Inhaled bronchodilators only as needed, MDI preferred  · PT/OT  · DVT prophylaxis: Lovenox 30 BID  · CODE: DNR-CCA  · Follow up with PCP in 1-2 weeks.  See me CMT in 6 weeks with repeat CXR to document clearing of COVID19 infiltrates, tentative plan for CT Chest in 3 months for Mediastinal lymphadenopathy.

## 2022-01-25 ENCOUNTER — TELEPHONE (OUTPATIENT)
Dept: PULMONOLOGY | Age: 72
End: 2022-01-25

## 2022-01-25 RX ORDER — FLUCONAZOLE 100 MG/1
TABLET ORAL
Qty: 15 TABLET | Refills: 0 | Status: SHIPPED | OUTPATIENT
Start: 2022-01-25 | End: 2022-02-09 | Stop reason: ALTCHOICE

## 2022-01-25 NOTE — TELEPHONE ENCOUNTER
Pls tell pt I sent in pills since the mouth wash did not fully treat. Can continue the mouth wash until it is gone.  Can stop taking the pills as soon as the symptoms go away (this may happen even after 1 day)

## 2022-01-25 NOTE — TELEPHONE ENCOUNTER
Patient called left  stating is almost out of medication for thrush and her throat is still itchy and feels irritated.       OV 12/23/[facility]  (pull in last visit note assessment/plan)   ASSESSMENT:  · Acute hypoxemic respiratory failure, improving  · COVID-19 viral pneumonia in an unvaccinated patient  · ARDS  · Pleurisy, ruled out PE - resolved  · Mediastinal lymphadenopathy       PLAN:  · COVID-19 isolation, droplet plus  · Supplemental oxygen to maintain SaO2 >92%; wean as tolerated    · Albuterol MDI for bronchospasms  · Decadron D#23, down to 1 mg qd  · Completed 5 days Zosyn, 5 days Levaquin  · S/P Tocilizumab x1 12/3/21; s/p 2 doses of Baricitinib  · Inhaled bronchodilators only as needed, MDI preferred  · PT/OT  · DVT prophylaxis: Lovenox 30 BID  · CODE: DNR-CCA  · Follow up with PCP in 1-2 weeks. See me CMT in 6 weeks with repeat CXR to document clearing of COVID19 infiltrates, tentative plan for CT Chest in 3 months for Mediastinal lymphadenopathy.

## 2022-01-26 ENCOUNTER — TELEPHONE (OUTPATIENT)
Dept: PULMONOLOGY | Age: 72
End: 2022-01-26

## 2022-01-26 NOTE — TELEPHONE ENCOUNTER
Dr. Ren Mo aware. Try to see your PCP within the next 1-2 weeks. If not, can discuss at upcoming visit.

## 2022-01-26 NOTE — TELEPHONE ENCOUNTER
Patient called states she has been has spells where she is having heart fluttering or racing. States it only happens when she is exerting herself. Appt marcos 2/8/22 with CMT    OV 12/23/21 with Dr. Wilde [facility] note    · Acute hypoxemic respiratory failure, improving  · COVID-19 viral pneumonia in an unvaccinated patient  · ARDS  · Pleurisy, ruled out PE - resolved  · Mediastinal lymphadenopathy       PLAN:  · COVID-19 isolation, droplet plus  · Supplemental oxygen to maintain SaO2 >92%; wean as tolerated    · Albuterol MDI for bronchospasms  · Decadron D#23, down to 1 mg qd  · Completed 5 days Zosyn, 5 days Levaquin  · S/P Tocilizumab x1 12/3/21; s/p 2 doses of Baricitinib  · Inhaled bronchodilators only as needed, MDI preferred  · PT/OT  · DVT prophylaxis: Lovenox 30 BID  · CODE: DNR-CCA  · Follow up with PCP in 1-2 weeks. See me CMT in 6 weeks with repeat CXR to document clearing of COVID19 infiltrates, tentative plan for CT Chest in 3 months for Mediastinal lymphadenopathy.

## 2022-02-08 ENCOUNTER — HOSPITAL ENCOUNTER (OUTPATIENT)
Dept: GENERAL RADIOLOGY | Age: 72
Discharge: HOME | End: 2022-02-08
Payer: MEDICARE

## 2022-02-08 ENCOUNTER — HOSPITAL ENCOUNTER (OUTPATIENT)
Age: 72
Discharge: HOME | End: 2022-02-08
Payer: MEDICARE

## 2022-02-08 ENCOUNTER — OFFICE VISIT (OUTPATIENT)
Dept: PULMONOLOGY | Age: 72
End: 2022-02-08
Payer: MEDICARE

## 2022-02-08 VITALS
HEART RATE: 83 BPM | SYSTOLIC BLOOD PRESSURE: 114 MMHG | HEIGHT: 64 IN | BODY MASS INDEX: 23.9 KG/M2 | WEIGHT: 140 LBS | DIASTOLIC BLOOD PRESSURE: 70 MMHG | RESPIRATION RATE: 16 BRPM | OXYGEN SATURATION: 99 %

## 2022-02-08 DIAGNOSIS — R59.0 MEDIASTINAL LYMPHADENOPATHY: Primary | ICD-10-CM

## 2022-02-08 DIAGNOSIS — J96.01 ACUTE RESPIRATORY FAILURE WITH HYPOXIA (HCC): ICD-10-CM

## 2022-02-08 DIAGNOSIS — U09.9 POST COVID-19 CONDITION, UNSPECIFIED: ICD-10-CM

## 2022-02-08 DIAGNOSIS — U09.9 POST-COVID-19 CONDITION: ICD-10-CM

## 2022-02-08 PROCEDURE — 99214 OFFICE O/P EST MOD 30 MIN: CPT

## 2022-02-08 PROCEDURE — 71046 X-RAY EXAM CHEST 2 VIEWS: CPT

## 2022-02-08 RX ORDER — FAMOTIDINE 20 MG/1
20 TABLET, FILM COATED ORAL 2 TIMES DAILY
Qty: 60 TABLET | Refills: 2 | Status: SHIPPED | OUTPATIENT
Start: 2022-02-08 | End: 2022-03-03 | Stop reason: ALTCHOICE

## 2022-02-08 RX ORDER — HYDROXYCHLOROQUINE SULFATE 200 MG/1
TABLET, FILM COATED ORAL DAILY
COMMUNITY

## 2022-02-08 NOTE — PATIENT INSTRUCTIONS
So great to see you again - take care! Oxygen titration:   Goal: Keep oxygen saturations between 90-95%  - If above 95%, turn down your oxygen flow   - If below 90%, turn up your oxygen flow  You will first realize you no longer need oxygen at rest (while sitting/relaxing), but will likely still need it with exertion (standing/walking). Once you no longer need oxygen with exertion, then it is safe to discontinue oxygen at nighttime during sleep. Keep the nighttime oxygen around for the longest.      GOAL: Scheduled aerobic exercise 20-30 minutes a day, for 5 days a week. You can start at just a couple minutes & work your way up. You should get your heart rate elevated and you should be short of breath, only able to speak about 3 words at a time. It won't be easy, but it is what it takes to get your body conditioned. You will start regaining your stamina and building your strength back.

## 2022-02-08 NOTE — PROGRESS NOTES
PULMONARY, CRITICAL CARE AND SLEEP MEDICINE   CC: Post COVID-19 condition  Referring Provider: F/u from 22 day admission on 12/1/21 for COVID-19 pneumonia in an unvaccinated patient. Interval History: 2/8/2022  - D/c'd home on 4 L O2. \"Itchy\" feeling extending from throat to substernum; was tx'd for thrush & also had humidification added to O2, which helped. Presents today with her daughter, whom she has been living with since discharge. Using 3 L O2. If she tries to turn down by 1/2 a liter, she gets very fatigued & above her lips turns grey. Has desats down to low 80s% sometimes, with O2 on, but has not been getting as low lately. Daughter asking about a d-dimer. Does PT activities 2x daily & weakness is improving, no aerobic exercise. Has an albuterol inhaler that doesn't make much of a difference. Performing daily activities of living by herself, still living with daughter & asking about when returning home would be appropriate, they live ~25 mins apart, also has close neighbor support. Presenting HPI: 12/2/2021 79 yo female with h/o depression who was admitted 12/1/2021 after presenting to the ED with known Covid+ test 10 days prior; she started taking Ivermectin at home. Subsequently noted to have a saturation of 70% and so she presented to the ED. Was not vaccinated for COVID-19. Pt was hospitalized for 22 days, course included ICU care with BiPAP & prolonged requirement of Vapotherm. reports that she has never smoked.  She has never used smokeless tobacco.    Past Medical History:   Diagnosis Date    Depression     Ischial bursitis     Osteopenia     Thyroid disease     Vitamin D deficiency      Past Surgical History:   Procedure Laterality Date    ELBOW SURGERY      HYSTERECTOMY      SHOULDER SURGERY      Right     Allergies   Allergen Reactions    Forteo [Parathyroid Hormone (Recomb)] Shortness Of Breath, Rash and Other (See Comments)     Boils under arms and upper thigh    Atarax [Hydroxyzine]     Codeine Nausea Only    Vicodin [Hydrocodone-Acetaminophen]      Medication list was reviewed and updated as needed in Epic.    family history includes Allergy (Severe) in her sister, sister, and sister; Cancer in her mother and sister; Diabetes in her maternal cousin; Heart Disease in her father; High Blood Pressure in her daughter, sister, sister, and sister; Other in her father. Review of Systems: Complete Review of system reviewed with patient and noted on attached review of system sheet. PHYSICAL EXAM:  Blood pressure 114/70, pulse 83, resp. rate 16, height 5' 4\" (1.626 m), weight 140 lb (63.5 kg), SpO2 99 %, not currently breastfeeding.' on 3 L  Constitutional:  No acute distress. HENT:  Oropharynx is clear and moist. No lesions or signs of thrush. Neck: No tracheal deviation present. Cardiovascular: Normal heart sounds. No lower extremity edema. Pulmonary/Chest: Clear breath sounds. No wheezes. No rhonchi. No rales. No decreased breath sounds. No accessory muscle usage or stridor. Musculoskeletal: No cyanosis. No clubbing. Skin: Skin is warm and dry. Psychiatric: Normal mood and affect. Neurologic: Speech fluent, alert and oriented, strength symmetric     DATA:  12/1/2021 BC NG  12/10/2021 Resp NG  12/10/2021 BC NG   12/10/2021 UC NG    CTPA 12/1/2021  1. No evidence of pulmonary embolism. 2. Extensive bilateral multifocal airspace disease compatible with multifocal   pneumonia, specifically COVID pneumonia. CXR 12/18/2021   1. Stable appropriate position of right arm PICC. 2. No significant change in appearance of multifocal airspace disease, most   consistent with a combination of multifocal pneumonia and superimposed   pulmonary edema.      CTPA 12/21/2021 personally reviewed  Pulmonary Arteries: Pulmonary arteries are adequately opacified for evaluation.  No evidence of intraluminal filling defect to suggest pulmonary embolism.  Main pulmonary artery is normal in caliber. Mediastinum: There is mild diffuse mediastinal adenopathy which is likely reactive. Lungs/pleura: There is extensive diffuse bilateral ground-glass type airspace disease.       Impression   Findings consistent with extensive bilateral pneumonia     CXR 2/8/2022  There is significant interval improvement in multifocal bilateral airspace  disease. Residual opacities may be related to residual airspace disease or  could be related to underlying fibrosis. The mediastinum and cardiac  silhouette are unremarkable. ASSESSMENT:  · Acute hypoxemic respiratory failure, improving  · Post-COVID-19 condition   · COVID-19 viral pneumonia in an unvaccinated patient with ARDS. Hospitalized 12/1/21 - 12/23/21  · Mediastinal lymphadenopathy, likely reactive      PLAN:  COVID-19 isolation, droplet plus  Supplemental oxygen to maintain SaO2 >92%; wean as tolerated. Discussed titration. Albuterol as needed, although I do not suspect RAD given minimal to no relief.    Recommend scheduled aerobic exercise regimen for cardiopulmonary reconditioning   Rx trial Pepcid  CT Chest with IV dye in 4-6 wks for mediastinal lymphadenopathy & f/u with me after

## 2022-02-09 PROBLEM — J06.9 ACUTE RESPIRATORY DISEASE DUE TO COVID-19 VIRUS: Status: RESOLVED | Noted: 2021-12-01 | Resolved: 2022-02-09

## 2022-02-09 PROBLEM — U09.9 POST-COVID-19 CONDITION: Chronic | Status: ACTIVE | Noted: 2022-02-09

## 2022-02-09 PROBLEM — U07.1 ACUTE RESPIRATORY DISEASE DUE TO COVID-19 VIRUS: Status: RESOLVED | Noted: 2021-12-01 | Resolved: 2022-02-09

## 2022-02-09 PROBLEM — U09.9 POST-COVID-19 CONDITION: Status: ACTIVE | Noted: 2022-02-09

## 2022-02-28 RX ORDER — LIOTHYRONINE SODIUM 5 UG/1
TABLET ORAL
Qty: 90 TABLET | Refills: 0 | OUTPATIENT
Start: 2022-02-28

## 2022-03-03 ENCOUNTER — TELEPHONE (OUTPATIENT)
Dept: PULMONOLOGY | Age: 72
End: 2022-03-03

## 2022-03-03 RX ORDER — OMEPRAZOLE 20 MG/1
20 CAPSULE, DELAYED RELEASE ORAL
Qty: 60 CAPSULE | Refills: 1 | Status: SHIPPED | OUTPATIENT
Start: 2022-03-03 | End: 2022-03-23

## 2022-03-03 NOTE — TELEPHONE ENCOUNTER
Patient called states she was to call with update on how she is doing on Pepcid. States about 40% better. Is still having some breakthrough reflux. Is using twice daily. 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 COVID-19 viral pneumonia in an unvaccinated patient with ARDS. Hospitalized 12/1/21 - 12/23/21  · Mediastinal lymphadenopathy, likely reactive      PLAN:  · COVID-19 isolation, droplet plus  · Supplemental oxygen to maintain SaO2 >92%; wean as tolerated. Discussed titration. · Albuterol as needed, although I do not suspect RAD given minimal to no relief.    · Recommend scheduled aerobic exercise regimen for cardiopulmonary reconditioning   · Rx trial Pepcid  · CT Chest with IV dye in 4-6 wks for mediastinal lymphadenopathy & f/u with me after

## 2022-03-03 NOTE — TELEPHONE ENCOUNTER
Spoke with pt and informed of [place], [place] response with verbal understanding. Confirmed pharmacy with pt.

## 2022-03-03 NOTE — TELEPHONE ENCOUNTER
Glad to hear. I changed the Rx to a different type of antacid, one that is stronger.  The instructions say take twice daily, but my recommendation is take once daily, and the 2nd dose can be optional depending on your symptoms that day

## 2022-03-23 ENCOUNTER — OFFICE VISIT (OUTPATIENT)
Dept: PULMONOLOGY | Age: 72
End: 2022-03-23
Payer: MEDICARE

## 2022-03-23 ENCOUNTER — HOSPITAL ENCOUNTER (OUTPATIENT)
Age: 72
Discharge: HOME | End: 2022-03-23
Payer: MEDICARE

## 2022-03-23 ENCOUNTER — HOSPITAL ENCOUNTER (OUTPATIENT)
Dept: CT IMAGING | Age: 72
Discharge: HOME | End: 2022-03-23
Payer: MEDICARE

## 2022-03-23 VITALS
RESPIRATION RATE: 18 BRPM | WEIGHT: 141.2 LBS | OXYGEN SATURATION: 95 % | HEIGHT: 64 IN | SYSTOLIC BLOOD PRESSURE: 122 MMHG | TEMPERATURE: 96.9 F | HEART RATE: 85 BPM | BODY MASS INDEX: 24.11 KG/M2 | DIASTOLIC BLOOD PRESSURE: 73 MMHG

## 2022-03-23 DIAGNOSIS — R59.0 MEDIASTINAL LYMPHADENOPATHY: ICD-10-CM

## 2022-03-23 DIAGNOSIS — U09.9 POST-COVID-19 CONDITION: Primary | ICD-10-CM

## 2022-03-23 DIAGNOSIS — J96.01 ACUTE RESPIRATORY FAILURE WITH HYPOXIA (HCC): ICD-10-CM

## 2022-03-23 PROBLEM — E03.9 ACQUIRED HYPOTHYROIDISM: Chronic | Status: ACTIVE | Noted: 2021-12-02

## 2022-03-23 LAB
C-REACTIVE PROTEIN: <3 MG/L (ref 0–5.1)
CREAT SERPL-MCNC: 0.7 MG/DL (ref 0.6–1.2)
GFR AFRICAN AMERICAN: >60
GFR NON-AFRICAN AMERICAN: >60

## 2022-03-23 PROCEDURE — 71260 CT THORAX DX C+: CPT

## 2022-03-23 PROCEDURE — 86140 C-REACTIVE PROTEIN: CPT

## 2022-03-23 PROCEDURE — 99214 OFFICE O/P EST MOD 30 MIN: CPT

## 2022-03-23 PROCEDURE — 82565 ASSAY OF CREATININE: CPT

## 2022-03-23 PROCEDURE — 36415 COLL VENOUS BLD VENIPUNCTURE: CPT

## 2022-03-23 PROCEDURE — 6360000004 HC RX CONTRAST MEDICATION

## 2022-03-23 RX ORDER — FAMOTIDINE 20 MG/1
TABLET, FILM COATED ORAL
COMMUNITY
Start: 2022-03-03

## 2022-03-23 RX ADMIN — IOPAMIDOL 75 ML: 755 INJECTION, SOLUTION INTRAVENOUS at 12:41

## 2022-03-23 NOTE — PROGRESS NOTES
Oxygen tested in office:    94% RA rest  1 min of walking - 92% on room air  2 min of walking - 92% on room air   3 min of walking - 92% on room air   4 min of walking - 88 placed on 1LPM  5 min of walking - 89 increased to 2LPM   6 min of walking - 94 on 2 LPM  During 2 minutes of rest, patient maintained saturation of 92% on 2LPM

## 2022-03-24 ENCOUNTER — TELEPHONE (OUTPATIENT)
Dept: PULMONOLOGY | Age: 72
End: 2022-03-24

## 2022-03-24 DIAGNOSIS — U09.9 POST-COVID-19 CONDITION: ICD-10-CM

## 2022-03-24 DIAGNOSIS — J96.01 ACUTE RESPIRATORY FAILURE WITH HYPOXIA (HCC): Primary | ICD-10-CM

## 2022-03-24 DIAGNOSIS — R59.0 MEDIASTINAL LYMPHADENOPATHY: ICD-10-CM

## 2022-03-24 NOTE — TELEPHONE ENCOUNTER
----- Message from Zeke Escalante PA-C sent at 3/23/2022  5:43 PM EDT -----  Urszula Mart, since this pt qualified for oxygen, did she need an order sent in or is the documentation sufficient?

## 2022-06-13 RX ORDER — FLUTICASONE PROPIONATE 50 MCG
SPRAY, SUSPENSION (ML) NASAL
Qty: 48 G | Refills: 0 | OUTPATIENT
Start: 2022-06-13

## 2025-04-12 ENCOUNTER — HOSPITAL ENCOUNTER (EMERGENCY)
Age: 75
Discharge: HOME | End: 2025-04-12
Attending: STUDENT IN AN ORGANIZED HEALTH CARE EDUCATION/TRAINING PROGRAM
Payer: MEDICARE

## 2025-04-12 ENCOUNTER — APPOINTMENT (OUTPATIENT)
Dept: GENERAL RADIOLOGY | Age: 75
End: 2025-04-12
Payer: MEDICARE

## 2025-04-12 VITALS
TEMPERATURE: 97.6 F | OXYGEN SATURATION: 99 % | SYSTOLIC BLOOD PRESSURE: 161 MMHG | DIASTOLIC BLOOD PRESSURE: 68 MMHG | RESPIRATION RATE: 16 BRPM | HEART RATE: 60 BPM | HEIGHT: 64 IN | BODY MASS INDEX: 25.78 KG/M2 | WEIGHT: 151 LBS

## 2025-04-12 DIAGNOSIS — R07.89 CHEST WALL PAIN: Primary | ICD-10-CM

## 2025-04-12 PROCEDURE — 99283 EMERGENCY DEPT VISIT LOW MDM: CPT

## 2025-04-12 PROCEDURE — 71046 X-RAY EXAM CHEST 2 VIEWS: CPT

## 2025-04-12 RX ORDER — LIDOCAINE 50 MG/G
1 PATCH TOPICAL DAILY
Qty: 10 PATCH | Refills: 0 | Status: SHIPPED | OUTPATIENT
Start: 2025-04-12 | End: 2025-04-22

## 2025-04-12 RX ORDER — MELOXICAM 15 MG/1
15 TABLET ORAL DAILY
Qty: 7 TABLET | Refills: 0 | Status: SHIPPED | OUTPATIENT
Start: 2025-04-12

## 2025-04-12 ASSESSMENT — PAIN DESCRIPTION - PAIN TYPE: TYPE: ACUTE PAIN

## 2025-04-12 ASSESSMENT — PAIN DESCRIPTION - FREQUENCY: FREQUENCY: CONTINUOUS

## 2025-04-12 ASSESSMENT — PAIN DESCRIPTION - DESCRIPTORS: DESCRIPTORS: SHARP;SHOOTING;DULL

## 2025-04-12 ASSESSMENT — PAIN DESCRIPTION - ORIENTATION: ORIENTATION: LEFT

## 2025-04-12 ASSESSMENT — PAIN - FUNCTIONAL ASSESSMENT
PAIN_FUNCTIONAL_ASSESSMENT: NONE - DENIES PAIN
PAIN_FUNCTIONAL_ASSESSMENT: PREVENTS OR INTERFERES SOME ACTIVE ACTIVITIES AND ADLS
PAIN_FUNCTIONAL_ASSESSMENT: 0-10

## 2025-04-12 ASSESSMENT — PAIN DESCRIPTION - ONSET: ONSET: ON-GOING

## 2025-04-12 ASSESSMENT — PAIN DESCRIPTION - LOCATION: LOCATION: RIB CAGE

## 2025-04-12 ASSESSMENT — PAIN SCALES - GENERAL: PAINLEVEL_OUTOF10: 5

## 2025-04-12 NOTE — DISCHARGE INSTRUCTIONS
It was nice to meet you, thank you for trusting us with your care, as we discussed please follow-up with your primary doctor if symptoms are not improving or persist in the next 3 to 4 days.  Return to the ED if you have difficulty breathing or further symptoms you find concerning for emergent illness or injury.  A prescription has been sent to your pharmacy for Mobic, this is an NSAID that last 24 hours, you can take this once a day, do not take other NSAIDs including ibuprofen or naproxen while taking Mobic.  A prescription has also been sent for lidocaine patches, you can place this directly over the area of pain for 12 hours on, 12 hours off.

## 2025-04-12 NOTE — ED PROVIDER NOTES
[facility] EMERGENCY DEPARTMENT  EMERGENCY DEPARTMENT ENCOUNTER      Pt Name: Sarika Sandoval  MRN: [id number]  Birthdate 1950  Date of evaluation: 4/12/2025  Provider: TRACEY VENTURA MD     CHIEF COMPLAINT       Chief Complaint   Patient presents with    Rib Injury     Got under a table and when she raised up she felt a pop in her left rib cage          HISTORY OF PRESENT ILLNESS   (Location/Symptom, Timing/Onset, Context/Setting, Quality, Duration, Modifying Factors, Severity) Note limiting factors.   I wore appropriate PPE for the entirety of this encounter.      HPI    Sarika Sandoval is a 74 y.o. female who presents to the emergency department for evaluation of left-sided rib pain.  States that she was underneath a table and when she stood up she felt a popping sensation in her left rib and felt like she has a fractured rib.  Patient states she took kratom that she gets from a smoke shop for pain control and she still having severe pain in the left chest wall.  Denies shortness of breath, nausea or vomiting, dizziness or lightheadedness, denies hitting her head or losing consciousness or other associated symptoms.  Pain does not radiate, it is sharp and worse with deep inspiration and tender on palpation.      Nursing Notes were reviewed.  Limitations to history:  Outside historians:    REVIEW OF SYSTEMS     Review of Systems as documented in HPI above.     PAST MEDICAL HISTORY     Past Medical History:   Diagnosis Date    Acute hypoxemic respiratory failure     ARDS (adult respiratory distress syndrome)     Depression     Ischial bursitis     Osteopenia     Pneumonia due to COVID-19 virus     Thyroid disease     Vitamin D deficiency        SURGICAL HISTORY       Past Surgical History:   Procedure Laterality Date    ELBOW SURGERY      HYSTERECTOMY (CERVIX STATUS UNKNOWN)      SHOULDER SURGERY      Right       CURRENT MEDICATIONS       Discharge Medication List as of 4/12/2025  4:02 PM        CONTINUE these
